# Patient Record
Sex: FEMALE | Race: WHITE | NOT HISPANIC OR LATINO | Employment: OTHER | URBAN - METROPOLITAN AREA
[De-identification: names, ages, dates, MRNs, and addresses within clinical notes are randomized per-mention and may not be internally consistent; named-entity substitution may affect disease eponyms.]

---

## 2017-03-23 ENCOUNTER — RECORDS - HEALTHEAST (OUTPATIENT)
Dept: LAB | Facility: CLINIC | Age: 72
End: 2017-03-23

## 2017-03-23 LAB
CHOLEST SERPL-MCNC: 185 MG/DL
FASTING STATUS PATIENT QL REPORTED: YES
HDLC SERPL-MCNC: 40 MG/DL
LDLC SERPL CALC-MCNC: 82 MG/DL
TRIGL SERPL-MCNC: 315 MG/DL

## 2017-04-20 ENCOUNTER — TELEPHONE (OUTPATIENT)
Dept: ONCOLOGY | Facility: CLINIC | Age: 72
End: 2017-04-20

## 2017-04-20 DIAGNOSIS — C50.911 MALIGNANT NEOPLASM OF RIGHT FEMALE BREAST, UNSPECIFIED SITE OF BREAST: Primary | ICD-10-CM

## 2017-04-20 NOTE — TELEPHONE ENCOUNTER
Return call to pt who states she will need new order placed for screening mammogram and not diagnostic.  Reviewed note from 9/22 and Dr Wilson aware that Medicare will not reimburse for Breast MRI and recommended 3D mammogram.  Pt called insurance company today and verified that they will cover 3D mammo.  New order placed and faxed to [facility] [employer] at [phone].  Confirmed order with Dr Wilson for Mammogram, Screening left with Tommogram. Pt will call [facility] to schedule.    Gale Chopra RN, BSN

## 2017-04-20 NOTE — TELEPHONE ENCOUNTER
Pt called this morning, she is wanting to make sure that here order for a mammogram is for a screening and not a diagnostic as insurance will not pay for diagnostic.  She would like a nurse to give her a call.  She can be reached at [phone], ok to leave a message. Carolyne Iraheta

## 2017-04-27 ENCOUNTER — HOSPITAL ENCOUNTER (OUTPATIENT)
Dept: MAMMOGRAPHY | Facility: HOSPITAL | Age: 72
Discharge: HOME | End: 2017-04-27
Attending: INTERNAL MEDICINE

## 2017-04-27 ENCOUNTER — TRANSFERRED RECORDS (OUTPATIENT)
Dept: HEALTH INFORMATION MANAGEMENT | Facility: CLINIC | Age: 72
End: 2017-04-27

## 2017-04-27 ENCOUNTER — RECORDS - HEALTHEAST (OUTPATIENT)
Dept: ADMINISTRATIVE | Facility: OTHER | Age: 72
End: 2017-04-27

## 2017-04-27 DIAGNOSIS — Z12.31 VISIT FOR SCREENING MAMMOGRAM: ICD-10-CM

## 2017-04-27 DIAGNOSIS — D05.00 BREAST NEOPLASM, TIS (LCIS), UNSPECIFIED LATERALITY: ICD-10-CM

## 2017-04-28 ENCOUNTER — HOSPITAL ENCOUNTER (OUTPATIENT)
Dept: MAMMOGRAPHY | Facility: HOSPITAL | Age: 72
Discharge: HOME | End: 2017-04-28
Attending: INTERNAL MEDICINE

## 2017-04-28 ENCOUNTER — TRANSFERRED RECORDS (OUTPATIENT)
Dept: HEALTH INFORMATION MANAGEMENT | Facility: CLINIC | Age: 72
End: 2017-04-28

## 2017-04-28 DIAGNOSIS — N64.89 DISTORTION OF CONTOUR OF BREAST: ICD-10-CM

## 2017-09-19 ENCOUNTER — RECORDS - HEALTHEAST (OUTPATIENT)
Dept: LAB | Facility: CLINIC | Age: 72
End: 2017-09-19

## 2017-09-19 LAB
CHOLEST SERPL-MCNC: 173 MG/DL
FASTING STATUS PATIENT QL REPORTED: ABNORMAL
HDLC SERPL-MCNC: 38 MG/DL
LDLC SERPL CALC-MCNC: 81 MG/DL
TRIGL SERPL-MCNC: 272 MG/DL

## 2017-09-21 ENCOUNTER — TELEPHONE (OUTPATIENT)
Dept: ONCOLOGY | Facility: CLINIC | Age: 72
End: 2017-09-21

## 2017-09-21 ENCOUNTER — ONCOLOGY VISIT (OUTPATIENT)
Dept: ONCOLOGY | Facility: CLINIC | Age: 72
End: 2017-09-21
Attending: INTERNAL MEDICINE
Payer: COMMERCIAL

## 2017-09-21 VITALS
RESPIRATION RATE: 16 BRPM | WEIGHT: 148 LBS | OXYGEN SATURATION: 94 % | BODY MASS INDEX: 25.27 KG/M2 | SYSTOLIC BLOOD PRESSURE: 128 MMHG | HEART RATE: 89 BPM | DIASTOLIC BLOOD PRESSURE: 72 MMHG | HEIGHT: 64 IN | TEMPERATURE: 98.8 F

## 2017-09-21 DIAGNOSIS — C50.911 MALIGNANT NEOPLASM OF RIGHT FEMALE BREAST, UNSPECIFIED ESTROGEN RECEPTOR STATUS, UNSPECIFIED SITE OF BREAST (H): Primary | ICD-10-CM

## 2017-09-21 PROCEDURE — 99213 OFFICE O/P EST LOW 20 MIN: CPT | Performed by: INTERNAL MEDICINE

## 2017-09-21 PROCEDURE — 99211 OFF/OP EST MAY X REQ PHY/QHP: CPT

## 2017-09-21 RX ORDER — ESZOPICLONE 2 MG/1
2 TABLET, FILM COATED ORAL
COMMUNITY

## 2017-09-21 RX ORDER — VENLAFAXINE 37.5 MG/1
37.5 TABLET ORAL 2 TIMES DAILY
Qty: 60 TABLET | Refills: 2 | Status: SHIPPED | OUTPATIENT
Start: 2017-09-21 | End: 2018-09-27

## 2017-09-21 ASSESSMENT — PAIN SCALES - GENERAL: PAINLEVEL: NO PAIN (0)

## 2017-09-21 NOTE — MR AVS SNAPSHOT
"              After Visit Summary   2017    Neelima Zuniga    MRN: [id number]           Patient Information     Date Of Birth          1945        Visit Information        Provider Department      2017 1:30 PM Charmaine Wilson MD [facility]        Today's Diagnoses     Malignant neoplasm of right female breast, unspecified estrogen receptor status, unspecified site of breast (H)    -  1      Care Instructions        RTC MD 1 year         No labs              Follow-ups after your visit        Who to contact     If you have questions or need follow up information about today's clinic visit or your schedule please contact [facility] directly at [phone].  Normal or non-critical lab and imaging results will be communicated to you by NPRhart, letter or phone within 4 business days after the clinic has received the results. If you do not hear from us within 7 days, please contact the clinic through NPRhart or phone. If you have a critical or abnormal lab result, we will notify you by phone as soon as possible.  Submit refill requests through Taggstar or call your pharmacy and they will forward the refill request to us. Please allow 3 business days for your refill to be completed.          Additional Information About Your Visit        MyChart Information     Taggstar lets you send messages to your doctor, view your test results, renew your prescriptions, schedule appointments and more. To sign up, go to [email or web address] . Click on \"Log in\" on the left side of the screen, which will take you to the Welcome page. Then click on \"Sign up Now\" on the right side of the page.     You will be asked to enter the access code listed below, as well as some personal information. Please follow the directions to create your username and password.     Your access code is: [email or web address]  Expires: 2017  2:17 PM     Your access code will  in 90 " "days. If you need help or a new code, please call your [facility] clinic or [phone].        Care EveryWhere ID     This is your Care EveryWhere ID. This could be used by other organizations to access your [facility] medical records  [id number]        Your Vitals Were     Pulse Temperature Respirations Height Pulse Oximetry BMI (Body Mass Index)    89 98.8  F (37.1  C) (Tympanic) 16 1.613 m (5' 3.5\") 94% 25.81 kg/m2       Blood Pressure from Last 3 Encounters:   09/21/17 128/72   09/15/16 127/66   09/01/15 126/70    Weight from Last 3 Encounters:   09/21/17 67.1 kg (148 lb)   09/15/16 66.2 kg (146 lb)   09/01/15 65.4 kg (144 lb 2 oz)              Today, you had the following     No orders found for display         Today's Medication Changes          These changes are accurate as of: 9/21/17  2:17 PM.  If you have any questions, ask your nurse or doctor.               These medicines have changed or have updated prescriptions.        Dose/Directions    * order for DME   This may have changed:  Another medication with the same name was added. Make sure you understand how and when to take each.   Used for:  Breast neoplasm, Tis (LCIS), unspecified laterality        mastectomt bras x3   Quantity:  3 Units   Refills:  1       * order for DME   This may have changed:  You were already taking a medication with the same name, and this prescription was added. Make sure you understand how and when to take each.   Used for:  Malignant neoplasm of right female breast, unspecified estrogen receptor status, unspecified site of breast (H)        Mastectomy bras    x2   Quantity:  2 Units   Refills:  1       * Notice:  This list has 2 medication(s) that are the same as other medications prescribed for you. Read the directions carefully, and ask your doctor or other care provider to review them with you.      Stop taking these medicines if you haven't already. Please contact your care team if you have questions.     CARLOS OCHOA     "            Where to get your medicines      These medications were sent to [id number] - [place], [place] - [address]  [address], [place] [address]     Phone:  [phone]     venlafaxine 37.5 MG tablet         Some of these will need a paper prescription and others can be bought over the counter.  Ask your nurse if you have questions.     Bring a paper prescription for each of these medications     order for DME                Primary Care Provider    Doctor Unknown, MD       No address on file        Equal Access to Services     KIMMY LEDESMA : Hadii aad ku hadasho Soomaali, waaxda luqadaha, qaybta kaalmada adeegyada, waxjose c glaser . So [facility] [phone].    ATENCIÓN: Si habla español, tiene a bowling disposición servicios gratuitos de asistencia lingüística. [facility] [phone].    We comply with applicable federal civil rights laws and [place] laws. We do not discriminate on the basis of race, color, national origin, age, disability sex, sexual orientation or gender identity.            Thank you!     Thank you for choosing [facility]  for your care. Our goal is always to provide you with excellent care. Hearing back from our patients is one way we can continue to improve our services. Please take a few minutes to complete the written survey that you may receive in the mail after your visit with us. Thank you!             Your Updated Medication List - Protect others around you: Learn how to safely use, store and throw away your medicines at www.disposemymeds.org.          This list is accurate as of: 9/21/17  2:17 PM.  Always use your most recent med list.                   Brand Name Dispense Instructions for use Diagnosis    calcium-vitamin D 600-400 MG-UNIT per tablet    CALTRATE     Take 1 tablet by mouth 2 times daily        DIGOXIN PO      Take 0.25 mg by mouth        ESZOPICLONE PO      Take 2 mg by mouth nightly  as needed for sleep        FLECAINIDE ACETATE PO      Take 50 mg by mouth        fluticasone 50 MCG/ACT spray    FLONASE     Spray 2 sprays into both nostrils daily        GLUCOSAMINE SULFATE PO      Take 1,500 mg by mouth        loratadine 10 MG tablet    CLARITIN     Take 10 mg by mouth daily        MAGNESIUM OXIDE PO           OMEPRAZOLE PO      Take 40 mg by mouth every morning        * order for DME     3 Units    mastectomt bras x3    Breast neoplasm, Tis (LCIS), unspecified laterality       * order for DME     2 Units    Mastectomy bras    x2    Malignant neoplasm of right female breast, unspecified estrogen receptor status, unspecified site of breast (H)       polyethylene glycol Packet    MIRALAX/GLYCOLAX     Take 1 packet by mouth daily        PRAVASTATIN SODIUM PO      Take 40 mg by mouth        venlafaxine 37.5 MG tablet    EFFEXOR    60 tablet    Take 1 tablet (37.5 mg) by mouth 2 times daily    Malignant neoplasm of right female breast, unspecified estrogen receptor status, unspecified site of breast (H)       * Notice:  This list has 2 medication(s) that are the same as other medications prescribed for you. Read the directions carefully, and ask your doctor or other care provider to review them with you.

## 2017-09-21 NOTE — PROGRESS NOTES
Neelima Zuniga is a 72-year-old patient who comes in today for interim followup.  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  She initially started with preventative therapy with Evista but has stopped that for quite a period of time at this point.  She had a mammogram done with tomosynthesis.  She was called back for a repeat mammogram.  I have reviewed both of them with her today.  The repeat one showed nothing sinister.  The patient continues to have some hot flashes.  On her last visit, I did recommend some Effexor.  I have reordered it again today to see if it would help her.  She has had an emotional rollercoaster over the last year because her daughter  at age 39.  Her daughter had special needs.  The patient has no other symptomatology that is worrisome today.      REVIEW OF SYSTEMS:  A 14-point comprehensive review of systems is otherwise unremarkable.      MEDICATIONS:  As charted.      ALLERGIES:  As charted.      PHYSICAL EXAMINATION:   GENERAL:  She is well-appearing lady in no acute distress.   VITAL SIGNS:  Stable.   HEENT:  Oropharynx clear, mucous membranes moist.   NECK:  Has no masses or goiter.   LYMPH:  There is no cervical, supraclavicular or infraclavicular adenopathy.   CHEST:  Clear to auscultation and percussion bilaterally.   HEART:  S1, S2 normal.  No added sounds or murmurs.   BREASTS:  The patient is status post right-sided mastectomy with reconstruction.  The scars look good.  Right and left axilla are negative.  Left breast normal, no palpable masses.  Left axilla negative.  There is an implant on the left side.   ABDOMEN:  Soft and nontender, no hepatosplenomegaly.   EXTREMITIES:  Legs are without tenderness or edema.   NEUROLOGIC:  No evidence of lymphedema.      DATA REVIEW:  She gets her blood work done at her primary care physician.      IMPRESSION:  Patient with a history ductal carcinoma in situ and  lobular carcinoma in situ.  She is doing well from my standpoint.  Her recent mammograms were negative.  She was called back for a repeat mammogram.  This time I have reviewed both mammograms with her.  She will see me back again in a year.  I have given her emotional support today.         BRANDY KILPATRICK MD             D: 2017 15:17   T: 2017 16:02   MT: MICKEY      Name:     SUSAN ROJAS   MRN:      [id number]        Account:      [id number]   :      1945           Visit Date:   2017      Document: [id number]

## 2017-09-21 NOTE — TELEPHONE ENCOUNTER
Patient called to verify her breast exam results were in her chart for her upcoming appt.  Exams are in the chart: orig on 4/28/17 and a f/u ~ needing more clarity on 5/4/17.  Patient to call with any other questionsor concerns.  Earl Verduzco, RN, BSN, OCN  [facility]  Patient Care Coordinator

## 2017-09-21 NOTE — NURSING NOTE
"Oncology Rooming Note    September 21, 2017 1:34 PM   Neelima Zuniga is a 72 year old female who presents for:    Chief Complaint   Patient presents with     Oncology Clinic Visit     Follow up     Initial Vitals: /72  Pulse 89  Temp 98.8  F (37.1  C) (Tympanic)  Resp 16  Ht 1.613 m (5' 3.5\")  Wt 67.1 kg (148 lb)  SpO2 94%  BMI 25.81 kg/m2 Estimated body mass index is 25.81 kg/(m^2) as calculated from the following:    Height as of this encounter: 1.613 m (5' 3.5\").    Weight as of this encounter: 67.1 kg (148 lb). Body surface area is 1.73 meters squared.  No Pain (0) Comment: Data Unavailable   No LMP recorded.  Allergies reviewed: Yes  Medications reviewed: Yes    Medications: Medication refills not needed today.  Pharmacy name entered into "[employer]": [id number] - [place], [place] - [address]    Clinical concerns: Follow up     8 minutes for nursing intake (face to face time)     Olena Tomas CMA     DISCHARGE PLAN:  Next appointments: See patient instruction section  Departure Mode: Ambulatory  Accompanied by: self  0 minutes for nursing discharge (face to face time)   Olena Tomas CMA                  "

## 2018-02-16 ENCOUNTER — RECORDS - HEALTHEAST (OUTPATIENT)
Dept: LAB | Facility: CLINIC | Age: 73
End: 2018-02-16

## 2018-02-19 LAB
GLIADIN IGA SER-ACNC: 1.3 U/ML
GLIADIN IGG SER-ACNC: <0.4 U/ML
IGA SERPL-MCNC: 175 MG/DL (ref 65–400)
TTG IGA SER-ACNC: 0.5 U/ML
TTG IGG SER-ACNC: <0.6 U/ML

## 2018-03-20 ENCOUNTER — RECORDS - HEALTHEAST (OUTPATIENT)
Dept: LAB | Facility: CLINIC | Age: 73
End: 2018-03-20

## 2018-03-20 LAB
ALBUMIN SERPL-MCNC: 4 G/DL (ref 3.5–5)
ALP SERPL-CCNC: 75 U/L (ref 45–120)
ALT SERPL W P-5'-P-CCNC: 19 U/L (ref 0–45)
ANION GAP SERPL CALCULATED.3IONS-SCNC: 9 MMOL/L (ref 5–18)
AST SERPL W P-5'-P-CCNC: 16 U/L (ref 0–40)
BASOPHILS # BLD AUTO: 0 THOU/UL (ref 0–0.2)
BASOPHILS NFR BLD AUTO: 1 % (ref 0–2)
BILIRUB SERPL-MCNC: 0.6 MG/DL (ref 0–1)
BUN SERPL-MCNC: 17 MG/DL (ref 8–28)
CALCIUM SERPL-MCNC: 9.3 MG/DL (ref 8.5–10.5)
CHLORIDE BLD-SCNC: 104 MMOL/L (ref 98–107)
CHOLEST SERPL-MCNC: 178 MG/DL
CO2 SERPL-SCNC: 29 MMOL/L (ref 22–31)
CREAT SERPL-MCNC: 0.66 MG/DL (ref 0.6–1.1)
EOSINOPHIL # BLD AUTO: 0.1 THOU/UL (ref 0–0.4)
EOSINOPHIL NFR BLD AUTO: 2 % (ref 0–6)
ERYTHROCYTE [DISTWIDTH] IN BLOOD BY AUTOMATED COUNT: 12.3 % (ref 11–14.5)
FASTING STATUS PATIENT QL REPORTED: YES
GFR SERPL CREATININE-BSD FRML MDRD: >60 ML/MIN/1.73M2
GLUCOSE BLD-MCNC: 119 MG/DL (ref 70–125)
HCT VFR BLD AUTO: 43.7 % (ref 35–47)
HDLC SERPL-MCNC: 39 MG/DL
HGB BLD-MCNC: 14.2 G/DL (ref 12–16)
LDLC SERPL CALC-MCNC: 89 MG/DL
LYMPHOCYTES # BLD AUTO: 2.1 THOU/UL (ref 0.8–4.4)
LYMPHOCYTES NFR BLD AUTO: 33 % (ref 20–40)
MCH RBC QN AUTO: 30 PG (ref 27–34)
MCHC RBC AUTO-ENTMCNC: 32.5 G/DL (ref 32–36)
MCV RBC AUTO: 92 FL (ref 80–100)
MONOCYTES # BLD AUTO: 0.6 THOU/UL (ref 0–0.9)
MONOCYTES NFR BLD AUTO: 10 % (ref 2–10)
NEUTROPHILS # BLD AUTO: 3.4 THOU/UL (ref 2–7.7)
NEUTROPHILS NFR BLD AUTO: 55 % (ref 50–70)
PLATELET # BLD AUTO: 322 THOU/UL (ref 140–440)
PMV BLD AUTO: 9.7 FL (ref 8.5–12.5)
POTASSIUM BLD-SCNC: 4.2 MMOL/L (ref 3.5–5)
PROT SERPL-MCNC: 6.6 G/DL (ref 6–8)
RBC # BLD AUTO: 4.74 MILL/UL (ref 3.8–5.4)
SODIUM SERPL-SCNC: 142 MMOL/L (ref 136–145)
TRIGL SERPL-MCNC: 250 MG/DL
TSH SERPL DL<=0.005 MIU/L-ACNC: 1.84 UIU/ML (ref 0.3–5)
WBC: 6.3 THOU/UL (ref 4–11)

## 2018-03-21 LAB — 25(OH)D3 SERPL-MCNC: 33.9 NG/ML (ref 30–80)

## 2018-04-23 ENCOUNTER — TELEPHONE (OUTPATIENT)
Dept: ONCOLOGY | Facility: CLINIC | Age: 73
End: 2018-04-23

## 2018-04-23 NOTE — TELEPHONE ENCOUNTER
Pt is calling for mammogram orders for 3D mammogram to be done at [facility] in [facility]. Pt had mastectomy on the right side, so mammogram is for left breast only. Pt denies any symptoms at this time.  Is requesting orders for screening mammogram.  Last mammogram was done 4/28/2017.   Will check with Dr Wilson for mammogram orders when she is back in the clinic on 4/25.  Will notify pt when orders have been faxed to [facility].  OK to leave a message with pt's  or on voicemail if unable to reach pt.  Camille Wright RN BSN

## 2018-04-25 DIAGNOSIS — C50.411 MALIGNANT NEOPLASM OF UPPER-OUTER QUADRANT OF RIGHT BREAST IN FEMALE, ESTROGEN RECEPTOR POSITIVE (H): Primary | ICD-10-CM

## 2018-04-25 DIAGNOSIS — Z17.0 MALIGNANT NEOPLASM OF UPPER-OUTER QUADRANT OF RIGHT BREAST IN FEMALE, ESTROGEN RECEPTOR POSITIVE (H): Primary | ICD-10-CM

## 2018-04-26 ENCOUNTER — RECORDS - HEALTHEAST (OUTPATIENT)
Dept: ADMINISTRATIVE | Facility: OTHER | Age: 73
End: 2018-04-26

## 2018-04-26 NOTE — TELEPHONE ENCOUNTER
Mammogram orders have been signed by Dr Wilson. Faxed to [facility] [place] at [phone].  Notified pt that orders have been faxed.  Pt states that she will call to schedule her mammogram.  No further action needed at this time.  Camille Wright RN BSN

## 2018-05-01 ENCOUNTER — RECORDS - HEALTHEAST (OUTPATIENT)
Dept: LAB | Facility: CLINIC | Age: 73
End: 2018-05-01

## 2018-05-03 LAB — BACTERIA SPEC CULT: NORMAL

## 2018-05-24 ENCOUNTER — TRANSFERRED RECORDS (OUTPATIENT)
Dept: HEALTH INFORMATION MANAGEMENT | Facility: CLINIC | Age: 73
End: 2018-05-24

## 2018-05-24 ENCOUNTER — HOSPITAL ENCOUNTER (OUTPATIENT)
Dept: MAMMOGRAPHY | Facility: CLINIC | Age: 73
Discharge: HOME | End: 2018-05-24
Attending: INTERNAL MEDICINE

## 2018-05-24 DIAGNOSIS — Z12.31 VISIT FOR SCREENING MAMMOGRAM: ICD-10-CM

## 2018-09-20 ENCOUNTER — RECORDS - HEALTHEAST (OUTPATIENT)
Dept: LAB | Facility: CLINIC | Age: 73
End: 2018-09-20

## 2018-09-20 LAB
ALBUMIN SERPL-MCNC: 4.1 G/DL (ref 3.5–5)
ALP SERPL-CCNC: 78 U/L (ref 45–120)
ALT SERPL W P-5'-P-CCNC: 17 U/L (ref 0–45)
ANION GAP SERPL CALCULATED.3IONS-SCNC: 8 MMOL/L (ref 5–18)
AST SERPL W P-5'-P-CCNC: 17 U/L (ref 0–40)
BASOPHILS # BLD AUTO: 0 THOU/UL (ref 0–0.2)
BASOPHILS NFR BLD AUTO: 1 % (ref 0–2)
BILIRUB SERPL-MCNC: 0.6 MG/DL (ref 0–1)
BUN SERPL-MCNC: 14 MG/DL (ref 8–28)
CALCIUM SERPL-MCNC: 9.8 MG/DL (ref 8.5–10.5)
CHLORIDE BLD-SCNC: 103 MMOL/L (ref 98–107)
CHOLEST SERPL-MCNC: 151 MG/DL
CO2 SERPL-SCNC: 29 MMOL/L (ref 22–31)
CREAT SERPL-MCNC: 0.66 MG/DL (ref 0.6–1.1)
EOSINOPHIL # BLD AUTO: 0.2 THOU/UL (ref 0–0.4)
EOSINOPHIL NFR BLD AUTO: 3 % (ref 0–6)
ERYTHROCYTE [DISTWIDTH] IN BLOOD BY AUTOMATED COUNT: 11.7 % (ref 11–14.5)
FASTING STATUS PATIENT QL REPORTED: YES
GFR SERPL CREATININE-BSD FRML MDRD: >60 ML/MIN/1.73M2
GLUCOSE BLD-MCNC: 118 MG/DL (ref 70–125)
HCT VFR BLD AUTO: 44.2 % (ref 35–47)
HDLC SERPL-MCNC: 39 MG/DL
HGB BLD-MCNC: 14.4 G/DL (ref 12–16)
LDLC SERPL CALC-MCNC: 69 MG/DL
LYMPHOCYTES # BLD AUTO: 2 THOU/UL (ref 0.8–4.4)
LYMPHOCYTES NFR BLD AUTO: 37 % (ref 20–40)
MCH RBC QN AUTO: 30 PG (ref 27–34)
MCHC RBC AUTO-ENTMCNC: 32.6 G/DL (ref 32–36)
MCV RBC AUTO: 92 FL (ref 80–100)
MONOCYTES # BLD AUTO: 0.6 THOU/UL (ref 0–0.9)
MONOCYTES NFR BLD AUTO: 12 % (ref 2–10)
NEUTROPHILS # BLD AUTO: 2.6 THOU/UL (ref 2–7.7)
NEUTROPHILS NFR BLD AUTO: 48 % (ref 50–70)
PLATELET # BLD AUTO: 301 THOU/UL (ref 140–440)
PMV BLD AUTO: 10 FL (ref 8.5–12.5)
POTASSIUM BLD-SCNC: 4.5 MMOL/L (ref 3.5–5)
PROT SERPL-MCNC: 6.5 G/DL (ref 6–8)
RBC # BLD AUTO: 4.8 MILL/UL (ref 3.8–5.4)
SODIUM SERPL-SCNC: 140 MMOL/L (ref 136–145)
TRIGL SERPL-MCNC: 215 MG/DL
WBC: 5.5 THOU/UL (ref 4–11)

## 2018-09-21 LAB — 25(OH)D3 SERPL-MCNC: 41.8 NG/ML (ref 30–80)

## 2018-09-27 ENCOUNTER — ONCOLOGY VISIT (OUTPATIENT)
Dept: ONCOLOGY | Facility: CLINIC | Age: 73
End: 2018-09-27
Attending: INTERNAL MEDICINE
Payer: COMMERCIAL

## 2018-09-27 ENCOUNTER — RECORDS - HEALTHEAST (OUTPATIENT)
Dept: ADMINISTRATIVE | Facility: OTHER | Age: 73
End: 2018-09-27

## 2018-09-27 VITALS
BODY MASS INDEX: 25.1 KG/M2 | HEIGHT: 64 IN | SYSTOLIC BLOOD PRESSURE: 122 MMHG | OXYGEN SATURATION: 95 % | RESPIRATION RATE: 16 BRPM | DIASTOLIC BLOOD PRESSURE: 65 MMHG | WEIGHT: 147 LBS | HEART RATE: 98 BPM | TEMPERATURE: 98 F

## 2018-09-27 DIAGNOSIS — D05.11 DUCTAL CARCINOMA IN SITU (DCIS) OF RIGHT BREAST: Primary | ICD-10-CM

## 2018-09-27 PROCEDURE — G0463 HOSPITAL OUTPT CLINIC VISIT: HCPCS

## 2018-09-27 PROCEDURE — 99214 OFFICE O/P EST MOD 30 MIN: CPT | Performed by: INTERNAL MEDICINE

## 2018-09-27 ASSESSMENT — PAIN SCALES - GENERAL: PAINLEVEL: NO PAIN (0)

## 2018-09-27 NOTE — PROGRESS NOTES
Visit Date:   09/27/2018      Neelima Zuniga is a 73-year-old patient who comes in today for interim followup.  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  She was diagnosed in 2007.  She comes in for a yearly visit.  She initially started preventative therapy with Evista, but could only tolerate it for approximately 2 years.  She continues to have mammograms done with tomosynthesis.  Her last one was done in 05/2018.  I have reviewed that with her today and that was negative.  She is going to repeat it again in of 05/2019.  She is present with her  today.  Her major comorbidity today is that of extreme at times hot flashes.  I did recommend on the last visit Effexor, but she does not want to try anything on prescription.  We have talked about a couple of things that she could try over-the-counter.  I would prefer if she did not use estrogen at this time.      REVIEW OF SYSTEMS:  A 14-point comprehensive review of systems is otherwise unremarkable.      MEDICATIONS:  As charted.      ALLERGIES:  AS charted.      PHYSICAL EXAMINATION:   GENERAL:  She is a well-appearing lady in no acute distress.   VITAL SIGNS:  Stable.   HEENT:  Oropharynx clear, mucous membranes moist.   NECK:  No masses or goiter.  There is no cervical, supraclavicular or infraclavicular adenopathy.   CHEST:  Clear to auscultation and percussion bilaterally.   HEART:  S1, S2 normal.  No added sounds or murmurs.   BREASTS:  The patient is status post right-sided mastectomy.  The scar looks good, no further palpable masses.  Left breast is normal, no palpable masses.  Right and left axillae negative.  The patient has an implant on the left side.   GASTROINTESTINAL:  Abdomen soft and nontender, no hepatosplenomegaly.   EXTREMITIES:  Legs are without tenderness or edema.      DATA REVIEW:  I have reviewed her mammogram that was done in 05/2018.  She does get her blood work done at her primary care's office.      IMPRESSION:   Patient with a history of DCIS and LCIS.  We have discussed today the risk of breast cancer with both of these entities.  She is status post right-sided mastectomy for the DCIS. We are still doing yearly mammograms on the left side.  She is due for another mammogram in 2019.  I have answered all of her questions today to the best of my ability.      Consultation time 25 minutes, the majority of time was spent on counseling and direction of patient care.         BRANDY KILPATRICK MD             D: 2018   T: 2018   MT: NAMAN      Name:     SUSAN ROJAS   MRN:      [id number]        Account:      [id number]   :      1945           Visit Date:   2018      Document: [id number]

## 2018-09-27 NOTE — PATIENT INSTRUCTIONS
RTC MD 1 year- Scheduled for 9/26/19. Naima Munoz   May 2019  -Scheduled for 5/27/19 at [facility]. Order faxed to [phone]. Naima DWYER   AVS printed and given to PtLesly DWYER

## 2018-09-27 NOTE — NURSING NOTE
"Oncology Rooming Note    September 27, 2018 12:59 PM   Neelima Zuniga is a 73 year old female who presents for:    Chief Complaint   Patient presents with     Oncology Clinic Visit     Malignant neoplasm of right breast     Initial Vitals: /65  Pulse 98  Temp 98  F (36.7  C) (Tympanic)  Resp 16  Ht 1.613 m (5' 3.5\")  Wt 66.7 kg (147 lb)  SpO2 95%  BMI 25.63 kg/m2 Estimated body mass index is 25.63 kg/(m^2) as calculated from the following:    Height as of this encounter: 1.613 m (5' 3.5\").    Weight as of this encounter: 66.7 kg (147 lb). Body surface area is 1.73 meters squared.  No Pain (0) Comment: Data Unavailable   No LMP recorded.  Allergies reviewed: Yes  Medications reviewed: Yes    Medications: Medication refills not needed today.  Pharmacy name entered into [employer]: [id number] - [place], [place] - [address]    Clinical concerns: follow up     8 minutes for nursing intake (face to face time)     Olena Tomas CMA     DISCHARGE PLAN:  Next appointments: See patient instruction section  Departure Mode: Ambulatory  Accompanied by:   0 minutes for nursing discharge (face to face time)   Olena Tomas CMA                  "

## 2018-09-27 NOTE — MR AVS SNAPSHOT
After Visit Summary   9/27/2018    Neelima Zuniga    MRN: [id number]           Patient Information     Date Of Birth          1945        Visit Information        Provider Department      9/27/2018 1:00 PM Charmaine Wilson MD [facility] On[facility]      Today's Diagnoses     Ductal carcinoma in situ (DCIS) of right breast    -  1      Care Instructions    RTC MD 1 year         Mammo   May 2019            Follow-ups after your visit        Your next 10 appointments already scheduled     Sep 26, 2019  1:00 PM CDT   Return Visit with Charmaine Wilson MD   [facility] ([facility])    [facility]  [address]  [facility] [id number] [phone]              Future tests that were ordered for you today     Open Future Orders        Priority Expected Expires Ordered    MA Screen Bilateral w/Lewis Routine 5/27/2019 9/27/2019 9/27/2018            Who to contact     If you have questions or need follow up information about today's clinic visit or your schedule please contact [facility] directly at [phone].  Normal or non-critical lab and imaging results will be communicated to you by MyChart, letter or phone within 4 business days after the clinic has received the results. If you do not hear from us within 7 days, please contact the clinic through MyChart or phone. If you have a critical or abnormal lab result, we will notify you by phone as soon as possible.  Submit refill requests through [employer] or call your pharmacy and they will forward the refill request to us. Please allow 3 business days for your refill to be completed.          Additional Information About Your Visit        Care EveryWhere ID     This is your Care EveryWhere ID. This could be used by other organizations to access your [place] medical records  [id number]        Your Vitals Were   "   Pulse Temperature Respirations Height Pulse Oximetry BMI (Body Mass Index)    98 98  F (36.7  C) (Tympanic) 16 1.613 m (5' 3.5\") 95% 25.63 kg/m2       Blood Pressure from Last 3 Encounters:   09/27/18 122/65   09/21/17 128/72   09/15/16 127/66    Weight from Last 3 Encounters:   09/27/18 66.7 kg (147 lb)   09/21/17 67.1 kg (148 lb)   09/15/16 66.2 kg (146 lb)                 Today's Medication Changes          These changes are accurate as of 9/27/18  1:46 PM.  If you have any questions, ask your nurse or doctor.               Stop taking these medicines if you haven't already. Please contact your care team if you have questions.     FLECAINIDE ACETATE PO           fluticasone 50 MCG/ACT spray   Commonly known as:  FLONASE           loratadine 10 MG tablet   Commonly known as:  CLARITIN           PRAVASTATIN SODIUM PO           venlafaxine 37.5 MG tablet   Commonly known as:  EFFEXOR                    Primary Care Provider    None Specified       No primary provider on file.        Equal Access to Services     [facility]: Shahid Hardwick, jason valdivia, wilman shi . So [facility] [phone].    ATENCIÓN: Si habla español, tiene a bowling disposición servicios gratuitos de asistencia lingüística. Llame al [phone].    We comply with applicable federal civil rights laws and [place] laws. We do not discriminate on the basis of race, color, national origin, age, disability, sex, sexual orientation, or gender identity.            Thank you!     Thank you for choosing [facility]  for your care. Our goal is always to provide you with excellent care. Hearing back from our patients is one way we can continue to improve our services. Please take a few minutes to complete the written survey that you may receive in the mail after your visit with us. Thank you!             Your Updated Medication List - Protect others around " you: Learn how to safely use, store and throw away your medicines at www.disposemymeds.org.          This list is accurate as of 9/27/18  1:46 PM.  Always use your most recent med list.                   Brand Name Dispense Instructions for use Diagnosis    calcium carbonate 600 mg-vitamin D 400 units 600-400 MG-UNIT per tablet    CALTRATE     Take 1 tablet by mouth 2 times daily        DIGOXIN PO      Take 0.25 mg by mouth        ESZOPICLONE PO      Take 2 mg by mouth nightly as needed for sleep        GLUCOSAMINE SULFATE PO      Take 1,500 mg by mouth        MAGNESIUM OXIDE PO           OMEPRAZOLE PO      Take 40 mg by mouth every morning        * order for DME     3 Units    mastectomt bras x3    Breast neoplasm, Tis (LCIS), unspecified laterality       * order for DME     2 Units    Mastectomy bras    x2    Malignant neoplasm of right female breast, unspecified estrogen receptor status, unspecified site of breast (H)       polyethylene glycol Packet    MIRALAX/GLYCOLAX     Take 1 packet by mouth daily        ROSUVASTATIN CALCIUM PO      Take 20 mg by mouth        * Notice:  This list has 2 medication(s) that are the same as other medications prescribed for you. Read the directions carefully, and ask your doctor or other care provider to review them with you.

## 2019-02-06 ENCOUNTER — TRANSFERRED RECORDS (OUTPATIENT)
Dept: HEALTH INFORMATION MANAGEMENT | Facility: CLINIC | Age: 74
End: 2019-02-06

## 2019-02-06 ENCOUNTER — AMBULATORY - HEALTHEAST (OUTPATIENT)
Dept: LAB | Facility: HOSPITAL | Age: 74
End: 2019-02-06

## 2019-02-06 DIAGNOSIS — G70.00 MYASTHENIA GRAVIS WITHOUT EXACERBATION (H): ICD-10-CM

## 2019-02-06 DIAGNOSIS — H02.402 PTOSIS OF LEFT EYELID: ICD-10-CM

## 2019-02-06 LAB
T4 FREE SERPL-MCNC: 1 NG/DL (ref 0.7–1.8)
TSH SERPL DL<=0.005 MIU/L-ACNC: 1.25 UIU/ML (ref 0.3–5)

## 2019-02-11 ENCOUNTER — TRANSFERRED RECORDS (OUTPATIENT)
Dept: HEALTH INFORMATION MANAGEMENT | Facility: CLINIC | Age: 74
End: 2019-02-11

## 2019-02-15 LAB — MUSK AB SER-SCNC: 0 NMOL/L (ref 0–0.02)

## 2019-02-19 ENCOUNTER — MEDICAL CORRESPONDENCE (OUTPATIENT)
Dept: HEALTH INFORMATION MANAGEMENT | Facility: CLINIC | Age: 74
End: 2019-02-19

## 2019-02-19 LAB
ACHR BIND IGG+IGM SER IA-SCNC: 0 NMOL/L
ACHR MOD AB/ACHR TOTAL SFR SER: 0 %
IMMUNOLOGIST REVIEW: NORMAL
STRIA MUS AB TITR SER: NEGATIVE TITER

## 2019-02-20 NOTE — TELEPHONE ENCOUNTER
FUTURE VISIT INFORMATION      FUTURE VISIT INFORMATION:    Date: 02/27/19    Time: 800am    Location: [facility]  REFERRAL INFORMATION:    Referring provider:  SHANE DUVAL    Referring providers clinic:  [facility]    Reason for visit/diagnosis  Strab consult (non-surgical), Myasthenia gravis.    RECORDS REQUESTED FROM:       Clinic name Comments Records Status Imaging Status   [facility] Notes sent to HIM on 2/20/19 Sent to HIM

## 2019-02-21 ENCOUNTER — DOCUMENTATION ONLY (OUTPATIENT)
Dept: CARE COORDINATION | Facility: CLINIC | Age: 74
End: 2019-02-21

## 2019-02-27 ENCOUNTER — PRE VISIT (OUTPATIENT)
Dept: OPHTHALMOLOGY | Facility: CLINIC | Age: 74
End: 2019-02-27

## 2019-02-27 ENCOUNTER — OFFICE VISIT (OUTPATIENT)
Dept: OPHTHALMOLOGY | Facility: CLINIC | Age: 74
End: 2019-02-27
Payer: MEDICARE

## 2019-02-27 DIAGNOSIS — H53.10 SUBJECTIVE VISUAL DISTURBANCE: Primary | ICD-10-CM

## 2019-02-27 DIAGNOSIS — H50.05 ALTERNATING ESOTROPIA: ICD-10-CM

## 2019-02-27 DIAGNOSIS — H50.22 HYPOTROPIA OF LEFT EYE: ICD-10-CM

## 2019-02-27 DIAGNOSIS — H02.409 PTOSIS OF EYELID, UNSPECIFIED LATERALITY: Primary | ICD-10-CM

## 2019-02-27 DIAGNOSIS — H02.409 PTOSIS OF EYELID, UNSPECIFIED LATERALITY: ICD-10-CM

## 2019-02-27 ASSESSMENT — TONOMETRY
OS_IOP_MMHG: 15
OD_IOP_MMHG: 13
IOP_METHOD: ICARE

## 2019-02-27 ASSESSMENT — VISUAL ACUITY
OS_CC+: -2
OD_CC: 20/20
OD_CC+: -1
METHOD: SNELLEN - LINEAR
OS_CC: 20/80

## 2019-02-27 ASSESSMENT — REFRACTION_WEARINGRX
OS_ADD: +3.00
OD_ADD: +3.00
OS_CYLINDER: +1.50
OS_SPHERE: -0.50
OD_CYLINDER: +0.75
OD_SPHERE: -1.00
OS_AXIS: 155
SPECS_TYPE: PAL
OD_AXIS: 010

## 2019-02-27 ASSESSMENT — CONF VISUAL FIELD
OD_NORMAL: 1
METHOD: COUNTING FINGERS
OS_NORMAL: 1

## 2019-02-27 ASSESSMENT — LEVATOR FUNCTION
OD_LEVATOR: 16
OS_LEVATOR: 17

## 2019-02-27 ASSESSMENT — MARGIN REFLEX DISTANCE
OD_MRD1: 1.5
OS_MRD1: 2

## 2019-02-27 NOTE — LETTER
2019         RE:  :  MRN: Neelima Zuniga  1945  [id number]     Dear Dr. Mikey Thomas,    Thank you for asking me to see your very pleasant patient, Neelima Zuniga, in neuro-ophthalmic consultation.  I would like to thank you for sending your records and I have summarized them in the history of present illness. She presented with her {Family Member:[id number]} who provided additional history.  My assessment and plan are below.  For further details, please see my attached clinic note.           Assessment & Plan     Neelima Zuniga is a 73 year old female with the following diagnoses:   No diagnosis found.     She has a history of childhood strabismus.  She does not recall which direction they were deviated.  Was patched as a child.  Denies surgery.  Had cataract surgery in 2018.  Ptosis started 2 years ago.  Not sure if there is variability to the ptosis.  Lab workup including acetcylcholine receptor binding antibody, MUSK antibody, and T4 have all been negative.  Denies double vision.  Denies difficulty swallowing or difficulty speaking.  Was noted to have a 35 PD left hypotropia and 10 ET      Visual acuity 20/20 RIGHT eye and 20/80 left eye.  She has 20 PD left hypotropia and 20 esotropia in primary gaze. MRD1 1.5 millimeters RIGHT eye and 2 millimeters left eye.  Levator function 16 millimeters RIGHT eye and 17 millimeters left eye.  Good orbicularis strength.  Negative ice test.        It is my impression that patient has ptosis, left hypotropia, esotropia.  Myasthenia would be unlikely with negative ice test, negative musk antibody and acetylcholine receptor binding antibody, absence of variability. Could consider mestinon, observation with repeat measurements, or single fiber EMG.  It is also possible this represents Thyroid eye disease.  I will obtain a thyroid stimulating immunoglobulin.              Before Ice     After ice         Again, thank you for allowing me to participate in the care of  your patient.      Sincerely,    Jesus Redmond MD  Professor  Ophthalmology Residency   Director of Neuro-Ophthalmology  [employer] Endowed Chair  Departments of Ophthalmology, Neurology, and Neurosurgery  [facility] [address] [place]  [address]  T - [phone]  F - [phone]  YONATHAN [email or web address]      CC: Mikey Thomas MD  [facility]  [address]  [facility] [address]  [facility] [address]  VIA Facsimile: [phone]     Charmaine Wilson MD  [address] [facility] [address]  [facility] [address]  VIA In Basket     Francia Li, RN  VIA In Basket     Cesar Louis MD  [facility] [address]  [facility] [address]  VIA Facsimile: [phone]     Ishmael Gerardo MD  [facility]  [address]  [address] [place] [address]  VIA Facsimile: [phone]       DX = ***

## 2019-02-27 NOTE — PROGRESS NOTES
Assessment & Plan     Neelima Zuniga is a 73 year old female with the following diagnoses:   1. Ptosis of eyelid, unspecified laterality    2. Alternating esotropia    3. Hypotropia of left eye         She has a history of childhood strabismus.  She does not recall which direction they were deviated.  Was patched as a child.  Denies surgery.  Had cataract surgery in 2018.  Ptosis started 2 years ago.  Not sure if there is variability to the ptosis.  Lab workup including acetcylcholine receptor binding antibody, MUSK antibody, and T4 have all been negative.  Denies double vision.  Denies difficulty swallowing or difficulty speaking.  Was noted to have a 35 PD left hypotropia and 10 ET.      Visual acuity 20/20 RIGHT eye and 20/80 left eye.  She has 20 PD left hypotropia and 20 esotropia in primary gaze. MRD1 1.5 millimeters RIGHT eye and 2 millimeters left eye.  Levator function 16 millimeters RIGHT eye and 17 millimeters left eye.  Good orbicularis strength.  Negative ice test.        It is my impression that patient has ptosis, left hypotropia, esotropia.  She has a negative ice test, negative musk antibody and acetylcholine receptor binding antibody. Her strabismus is close to being the same.  She notes variable ptosis. She still may have ocular myasthenia gravis.  Could consider mestinon, observation with repeat measurements, or single fiber EMG.  I discussed these possibilities with her and she wants to pursue single fiber EMG.  I will arrange. It is also possible this represents Thyroid eye disease.  I will obtain a thyroid stimulating immunoglobulin.              Before Ice     After ice          Attending Physician Attestation:  Complete documentation of historical and exam elements from today's encounter can be found in the full encounter summary report (not reduplicated in this progress note).  I personally obtained the chief complaint(s) and history of present illness.  I confirmed and edited as  necessary the review of systems, past medical/surgical history, family history, social history, and examination findings as documented by others; and I examined the patient myself.  I personally reviewed the relevant tests, images, and reports as documented above.  I formulated and edited as necessary the assessment and plan and discussed the findings and management plan with the patient and family. - Jesus Redmond MD

## 2019-02-27 NOTE — LETTER
2019         RE:  :  MRN: Neelima Zuniga  1945  [id number]     Dear Dr. Thomas,    Thank you for asking me to see your very pleasant patient, Neelima Zuniga, in neuro-ophthalmic consultation.  I would like to thank you for sending your records and I have summarized them in the history of present illness. She presented with her spouse who provided additional history.  My assessment and plan are below.  For further details, please see my attached clinic note.           Assessment & Plan     Neelima Zuniga is a 73 year old female with the following diagnoses:   1. Ptosis of eyelid, unspecified laterality    2. Alternating esotropia    3. Hypotropia of left eye         She has a history of childhood strabismus.  She does not recall which direction they were deviated.  Was patched as a child.  Denies surgery.  Had cataract surgery in 2018.  Ptosis started 2 years ago.  Not sure if there is variability to the ptosis.  Lab workup including acetcylcholine receptor binding antibody, MUSK antibody, and T4 have all been negative.  Denies double vision.  Denies difficulty swallowing or difficulty speaking.  Was noted to have a 35 PD left hypotropia and 10 ET.      Visual acuity 20/20 RIGHT eye and 20/80 left eye.  She has 20 PD left hypotropia and 20 esotropia in primary gaze. MRD1 1.5 millimeters RIGHT eye and 2 millimeters left eye.  Levator function 16 millimeters RIGHT eye and 17 millimeters left eye.  Good orbicularis strength.  Negative ice test.        It is my impression that patient has ptosis, left hypotropia, esotropia.  She has a negative ice test, negative musk antibody and acetylcholine receptor binding antibody. Her strabismus is close to being the same.  She notes variable ptosis. She still may have ocular myasthenia gravis.  Could consider mestinon, observation with repeat measurements, or single fiber EMG.  I discussed these possibilities with her and she wants to pursue single fiber EMG.  I  will arrange. It is also possible this represents Thyroid eye disease.  I will obtain a thyroid stimulating immunoglobulin.          Before Ice     After ice               Again, thank you for allowing me to participate in the care of your patient.      Sincerely,    Jesus Redmond MD  Professor  Ophthalmology Residency   Director of Neuro-Ophthalmology  [employer] Endowed Chair  Departments of Ophthalmology, Neurology, and Neurosurgery  [facility] [address]  [address] [facility], [place]  [address]  T - [phone]  F - [phone]  SHEELA [email or web address]      CC: Mikey Thomas MD  [facility]  [address] [facility]  [address]  [facility] [address]  VIA Facsimile: [phone]     Charmaine Wilson MD  [address] [facility] [address]  [facility] [address]  VIA In Basket     Francia Li RN  VIA In Basket     Cesar Louis MD  [facility] [place] [address]  [facility] [address]  VIA Facsimile: [phone]     Ishmael Gerardo MD  [facility]  [address]  [place] [address]  VIA Facsimile: [phone]

## 2019-03-01 LAB — TSI SER-ACNC: <1 TSI INDEX

## 2019-03-04 ENCOUNTER — OFFICE VISIT (OUTPATIENT)
Dept: NEUROLOGY | Facility: CLINIC | Age: 74
End: 2019-03-04
Payer: MEDICARE

## 2019-03-04 DIAGNOSIS — H02.402 PTOSIS OF LEFT EYELID: Primary | ICD-10-CM

## 2019-03-04 NOTE — LETTER
3/4/2019       RE: Neelima Zuniga  [address] [place] [address]     Dear Colleague,    Thank you for referring your patient, Neelima Zuniga, to the [facility] EMG at [facility]. Please see a copy of my visit note below.        [facility]  Electrodiagnostic Laboratory    Nerve Conduction & EMG Report          Patient: Neelima Zuniga YOB: 1945  Patient ID: [id number] Age: 73 Years 8 Months  Gender: Female      History & Examination: This 73 year-old woman presents with ptosis of the eye lid. This study was requested to evaluate status of neuromuscular transmission.    Techniques:  Single fiber EMG was done with a disposable ALPINE bioMed 25g concentric needle electrode, and voluntary activation of motor units.     Results:  Single fiber EMG of the left orbicularis oculi was of a good technical quality.  The left orbicularis oculi muscle was studied with 15 individual pairs. All individual pairs demonstrated normal jitter ranging from 27 ms to 44  s with normal <45  s in the left orbicularis oculi muscle.  The mean jitter of the 15 pairs in the left orbicularis oculi muscle was 31  s (nl< 32  s). There was no transmission blocking.    Interpretation:    There is no electrodiagnostic evidence for neuromuscular transmission defect.    Comment: This study does not provide evidence to support diagnosis of myasthenia gravis.       Lemuel Thomason MD            SFEMG: L ORB OCULI        SFEMG      Muscle   N Jitter Block MIPI CLAIR MSD FR       s   s  s  s pps   L ORB OCULI 1.1 102 34.56 - 2616.5 34.56 42.59 27    1.2 100 28.78 - 532.52 28.78 33.58 27    2.1 57 44.56 - 588.79 44.56 52.17 37    2.2 48 43.46 - 522.89 43.46 44.48 37    3.1 48 27.18 - 367.60 27.18 27.88 23    4.1 41 33.65 - 510.39 33.65 36.86 20    5.1 96 32.07 - 584.66 32.07 52.62 15    6.1 53 26.68 - 671.79 26.68 39.64 36    7.1 52 31.47 - 458.36 31.47 34.38 32    8.1 31 29.77 - 1426.2  29.77 67.87 20    8.2 124 27.03 - 1434.3 27.03 43.36 20    9.1 44 26.18 - 352.72 26.18 37.22 14    10.1 24 20.78 - 548.29 20.78 28.29 18    11.1 32 33.57 - 277.33 34.64 33.57 20    12.1 109 27.15 - 1426.5 27.15 58.48 18    Mean  31.13 - 821.30 31.20 42.20 24    Median  29.77 - 548.29 29.77 39.64 20    % Blocked   0        Num Pairs 15             Again, thank you for allowing me to participate in the care of your patient.      Sincerely,    Lemuel Thomason MD

## 2019-03-04 NOTE — RESULT ENCOUNTER NOTE
Neelima,    Your thyroid stimulating immunoglobulin was normal.  I am waiting on your single fiber EMG      Thank you for allowing me to share in your care.     Jesus Redmond MD

## 2019-03-04 NOTE — PROGRESS NOTES
[facility]  Electrodiagnostic Laboratory    Nerve Conduction & EMG Report          Patient: Neelima Zuniga YOB: 1945  Patient ID: [id number] Age: 73 Years 8 Months  Gender: Female      History & Examination: This 73 year-old woman presents with ptosis of the eye lid. This study was requested to evaluate status of neuromuscular transmission.    Techniques:  Single fiber EMG was done with a disposable ALPINE bioMed 25g concentric needle electrode, and voluntary activation of motor units.     Results:  Single fiber EMG of the left orbicularis oculi was of a good technical quality.  The left orbicularis oculi muscle was studied with 15 individual pairs. All individual pairs demonstrated normal jitter ranging from 27 ms to 44  s with normal <45  s in the left orbicularis oculi muscle.  The mean jitter of the 15 pairs in the left orbicularis oculi muscle was 31  s (nl< 32  s). There was no transmission blocking.    Interpretation:    There is no electrodiagnostic evidence for neuromuscular transmission defect.    Comment: This study does not provide evidence to support diagnosis of myasthenia gravis.       Lemuel Thomason MD            SFEMG: L ORB OCULI        SFEMG      Muscle   N Jitter Block MIPI CLAIR MSD FR       s   s  s  s pps   L ORB OCULI 1.1 102 34.56 - 2616.5 34.56 42.59 27    1.2 100 28.78 - 532.52 28.78 33.58 27    2.1 57 44.56 - 588.79 44.56 52.17 37    2.2 48 43.46 - 522.89 43.46 44.48 37    3.1 48 27.18 - 367.60 27.18 27.88 23    4.1 41 33.65 - 510.39 33.65 36.86 20    5.1 96 32.07 - 584.66 32.07 52.62 15    6.1 53 26.68 - 671.79 26.68 39.64 36    7.1 52 31.47 - 458.36 31.47 34.38 32    8.1 31 29.77 - 1426.9 29.77 67.87 20    8.2 124 27.03 - 1434.3 27.03 43.36 20    9.1 44 26.18 - 352.72 26.18 37.22 14    10.1 24 20.78 - 548.29 20.78 28.29 18    11.1 32 33.57 - 277.33 34.64 33.57 20    12.1 109 27.15 - 1426.5 27.15 58.48 18    Mean  31.13 - 821.30 31.20 42.20 24    Median   29.77 - 548.29 29.77 39.64 20    % Blocked   0        Num Pairs 15

## 2019-03-11 ENCOUNTER — RECORDS - HEALTHEAST (OUTPATIENT)
Dept: LAB | Facility: CLINIC | Age: 74
End: 2019-03-11

## 2019-03-11 LAB
25(OH)D3 SERPL-MCNC: 33.3 NG/ML (ref 30–80)
ALBUMIN SERPL-MCNC: 4.5 G/DL (ref 3.5–5)
ALP SERPL-CCNC: 74 U/L (ref 45–120)
ALT SERPL W P-5'-P-CCNC: 24 U/L (ref 0–45)
ANION GAP SERPL CALCULATED.3IONS-SCNC: 11 MMOL/L (ref 5–18)
AST SERPL W P-5'-P-CCNC: 19 U/L (ref 0–40)
BASOPHILS # BLD AUTO: 0 THOU/UL (ref 0–0.2)
BASOPHILS NFR BLD AUTO: 1 % (ref 0–2)
BILIRUB SERPL-MCNC: 0.6 MG/DL (ref 0–1)
BUN SERPL-MCNC: 15 MG/DL (ref 8–28)
CALCIUM SERPL-MCNC: 9.7 MG/DL (ref 8.5–10.5)
CHLORIDE BLD-SCNC: 102 MMOL/L (ref 98–107)
CHOLEST SERPL-MCNC: 168 MG/DL
CO2 SERPL-SCNC: 28 MMOL/L (ref 22–31)
CREAT SERPL-MCNC: 0.75 MG/DL (ref 0.6–1.1)
EOSINOPHIL # BLD AUTO: 0.1 THOU/UL (ref 0–0.4)
EOSINOPHIL NFR BLD AUTO: 2 % (ref 0–6)
ERYTHROCYTE [DISTWIDTH] IN BLOOD BY AUTOMATED COUNT: 12.1 % (ref 11–14.5)
FASTING STATUS PATIENT QL REPORTED: YES
GFR SERPL CREATININE-BSD FRML MDRD: >60 ML/MIN/1.73M2
GLUCOSE BLD-MCNC: 117 MG/DL (ref 70–125)
HCT VFR BLD AUTO: 45.4 % (ref 35–47)
HDLC SERPL-MCNC: 46 MG/DL
HGB BLD-MCNC: 14.9 G/DL (ref 12–16)
LDLC SERPL CALC-MCNC: 72 MG/DL
LYMPHOCYTES # BLD AUTO: 2.2 THOU/UL (ref 0.8–4.4)
LYMPHOCYTES NFR BLD AUTO: 37 % (ref 20–40)
MCH RBC QN AUTO: 30.2 PG (ref 27–34)
MCHC RBC AUTO-ENTMCNC: 32.8 G/DL (ref 32–36)
MCV RBC AUTO: 92 FL (ref 80–100)
MONOCYTES # BLD AUTO: 0.7 THOU/UL (ref 0–0.9)
MONOCYTES NFR BLD AUTO: 11 % (ref 2–10)
NEUTROPHILS # BLD AUTO: 2.9 THOU/UL (ref 2–7.7)
NEUTROPHILS NFR BLD AUTO: 49 % (ref 50–70)
PLATELET # BLD AUTO: 310 THOU/UL (ref 140–440)
PMV BLD AUTO: 10 FL (ref 8.5–12.5)
POTASSIUM BLD-SCNC: 4.1 MMOL/L (ref 3.5–5)
PROT SERPL-MCNC: 7 G/DL (ref 6–8)
RBC # BLD AUTO: 4.93 MILL/UL (ref 3.8–5.4)
SODIUM SERPL-SCNC: 141 MMOL/L (ref 136–145)
T4 FREE SERPL-MCNC: 0.9 NG/DL (ref 0.7–1.8)
TRIGL SERPL-MCNC: 250 MG/DL
TSH SERPL DL<=0.005 MIU/L-ACNC: 3.56 UIU/ML (ref 0.3–5)
WBC: 5.9 THOU/UL (ref 4–11)

## 2019-03-13 ENCOUNTER — TELEPHONE (OUTPATIENT)
Dept: OPHTHALMOLOGY | Facility: CLINIC | Age: 74
End: 2019-03-13

## 2019-03-13 NOTE — TELEPHONE ENCOUNTER
Dr. Rodriguez called and spoke to pt about normal results 3-13-19 per Dr. Michael Campos RN 5:11 PM 03/13/19    --      Pt calling about lab results today.  EMG results done last week  Note to Dr. Rodriguez/facilitator to assist in review of lab results for thyroid testing and myasthenia gravis/EMG results and plan of care.    aJime Campos RN 12:05 PM 03/13/19

## 2019-03-13 NOTE — TELEPHONE ENCOUNTER
Called and notified patient that the EMG results and the TSI lab work was normal.    Brady Rodriguez MD  PGY-3 Ophthalmology Resident  [phone]

## 2019-05-09 ENCOUNTER — RECORDS - HEALTHEAST (OUTPATIENT)
Dept: LAB | Facility: CLINIC | Age: 74
End: 2019-05-09

## 2019-05-11 LAB — BACTERIA SPEC CULT: NORMAL

## 2019-05-28 ENCOUNTER — TRANSFERRED RECORDS (OUTPATIENT)
Dept: HEALTH INFORMATION MANAGEMENT | Facility: CLINIC | Age: 74
End: 2019-05-28

## 2019-05-28 ENCOUNTER — HOSPITAL ENCOUNTER (OUTPATIENT)
Dept: MAMMOGRAPHY | Facility: CLINIC | Age: 74
Discharge: HOME | End: 2019-05-28
Attending: INTERNAL MEDICINE

## 2019-05-28 DIAGNOSIS — Z12.31 VISIT FOR SCREENING MAMMOGRAM: ICD-10-CM

## 2019-07-10 ENCOUNTER — TRANSFERRED RECORDS (OUTPATIENT)
Dept: HEALTH INFORMATION MANAGEMENT | Facility: CLINIC | Age: 74
End: 2019-07-10

## 2019-09-10 ENCOUNTER — RECORDS - HEALTHEAST (OUTPATIENT)
Dept: LAB | Facility: CLINIC | Age: 74
End: 2019-09-10

## 2019-09-10 LAB
ALBUMIN SERPL-MCNC: 4.2 G/DL (ref 3.5–5)
ALP SERPL-CCNC: 72 U/L (ref 45–120)
ALT SERPL W P-5'-P-CCNC: 16 U/L (ref 0–45)
ANION GAP SERPL CALCULATED.3IONS-SCNC: 11 MMOL/L (ref 5–18)
AST SERPL W P-5'-P-CCNC: 17 U/L (ref 0–40)
BILIRUB SERPL-MCNC: 0.5 MG/DL (ref 0–1)
BUN SERPL-MCNC: 17 MG/DL (ref 8–28)
CALCIUM SERPL-MCNC: 9.6 MG/DL (ref 8.5–10.5)
CHLORIDE BLD-SCNC: 103 MMOL/L (ref 98–107)
CHOLEST SERPL-MCNC: 141 MG/DL
CO2 SERPL-SCNC: 27 MMOL/L (ref 22–31)
CREAT SERPL-MCNC: 0.71 MG/DL (ref 0.6–1.1)
FASTING STATUS PATIENT QL REPORTED: YES
GFR SERPL CREATININE-BSD FRML MDRD: >60 ML/MIN/1.73M2
GLUCOSE BLD-MCNC: 110 MG/DL (ref 70–125)
HDLC SERPL-MCNC: 41 MG/DL
LDLC SERPL CALC-MCNC: 58 MG/DL
POTASSIUM BLD-SCNC: 4.5 MMOL/L (ref 3.5–5)
PROT SERPL-MCNC: 6.5 G/DL (ref 6–8)
SODIUM SERPL-SCNC: 141 MMOL/L (ref 136–145)
TRIGL SERPL-MCNC: 211 MG/DL

## 2019-09-11 LAB — 25(OH)D3 SERPL-MCNC: 44.1 NG/ML (ref 30–80)

## 2019-09-17 ENCOUNTER — TRANSFERRED RECORDS (OUTPATIENT)
Dept: HEALTH INFORMATION MANAGEMENT | Facility: CLINIC | Age: 74
End: 2019-09-17

## 2019-09-26 ENCOUNTER — ONCOLOGY VISIT (OUTPATIENT)
Dept: ONCOLOGY | Facility: CLINIC | Age: 74
End: 2019-09-26
Attending: INTERNAL MEDICINE
Payer: MEDICARE

## 2019-09-26 VITALS
HEIGHT: 65 IN | WEIGHT: 142 LBS | OXYGEN SATURATION: 95 % | SYSTOLIC BLOOD PRESSURE: 119 MMHG | BODY MASS INDEX: 23.66 KG/M2 | HEART RATE: 76 BPM | RESPIRATION RATE: 16 BRPM | TEMPERATURE: 98.5 F | DIASTOLIC BLOOD PRESSURE: 67 MMHG

## 2019-09-26 DIAGNOSIS — C50.411 MALIGNANT NEOPLASM OF UPPER-OUTER QUADRANT OF RIGHT BREAST IN FEMALE, ESTROGEN RECEPTOR POSITIVE (H): Primary | ICD-10-CM

## 2019-09-26 DIAGNOSIS — Z17.0 MALIGNANT NEOPLASM OF UPPER-OUTER QUADRANT OF RIGHT BREAST IN FEMALE, ESTROGEN RECEPTOR POSITIVE (H): Primary | ICD-10-CM

## 2019-09-26 PROCEDURE — G0463 HOSPITAL OUTPT CLINIC VISIT: HCPCS

## 2019-09-26 PROCEDURE — 99213 OFFICE O/P EST LOW 20 MIN: CPT | Performed by: INTERNAL MEDICINE

## 2019-09-26 ASSESSMENT — PAIN SCALES - GENERAL: PAINLEVEL: NO PAIN (0)

## 2019-09-26 ASSESSMENT — MIFFLIN-ST. JEOR: SCORE: 1144.75

## 2019-09-26 NOTE — NURSING NOTE
"Oncology Rooming Note    September 26, 2019 1:23 PM   Neelima Zuniga is a 74 year old female who presents for:    Chief Complaint   Patient presents with     Oncology Clinic Visit     Malignant neoplasm of right breast      Initial Vitals: /67   Pulse 76   Temp 98.5  F (36.9  C) (Oral)   Resp 16   Ht 1.659 m (5' 5.3\")   Wt 64.4 kg (142 lb)   SpO2 95%   BMI 23.41 kg/m   Estimated body mass index is 23.41 kg/m  as calculated from the following:    Height as of this encounter: 1.659 m (5' 5.3\").    Weight as of this encounter: 64.4 kg (142 lb). Body surface area is 1.72 meters squared.  No Pain (0) Comment: Data Unavailable   No LMP recorded.  Allergies reviewed: Yes  Medications reviewed: Yes    Medications: Medication refills not needed today.  Pharmacy name entered into [facility]: [id number] - [place], [place] - [address]    Clinical concerns: 1 year follow up,pt is also on probiotic       Gloria Merlos CMA              "

## 2019-09-27 NOTE — PROGRESS NOTES
Visit Date:   09/26/2019      HISTORY OF PRESENT ILLNESS:  Ms. Neelima Zuniga is 74 years old, comes in today for interim followup.  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  She was diagnosed in 2007, so she is over 10 years out from her diagnosis.  She initially started preventative therapy with Evista but could not tolerate it.  She continues to have a 3D mammograms done.  Her last mammogram was in 05/2019.  I have reviewed that mammogram today.  It was done at [facility] and was benign without any evidence of suspicious findings.  I have discussed that with her today.  She is actually feeling quite well.  She just had surgery on her eyelid, so she is recovering from that.      REVIEW OF SYSTEMS:  A 14-point comprehensive review of systems is unremarkable.      MEDICATIONS AND ALLERGIES:  Outlined in the nursing records.      PHYSICAL EXAMINATION:   GENERAL:  She is well-appearing lady in no acute distress.   VITAL SIGNS:  Stable.   HEENT:  Oropharynx clear, mucous membranes moist.   NECK:  Has no masses or goiter.   NECK:  There is no cervical, supraclavicular or infraclavicular adenopathy.   CHEST:  Clear to auscultation and percussion bilaterally.   HEART:  Sounds 1, 2 normal.  No added sounds or murmurs.   BREASTS:  Right breast, status post right-sided mastectomy, the scar looks good.  Left breast normal, no palpable masses.  Right and left axillae are negative.   ABDOMEN:  Soft and nontender.   EXTREMITIES:  Legs without tenderness or edema.      DATA REVIEW:  I have reviewed her recent mammogram.      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  She is status post right-sided mastectomy for the DCIS and continues to do yearly mammograms on the left side.  Her next one will be the spring of 2020.  She is feeling well today.  I have answered all of her questions.  I will see her back again in a year.         BRANDY KILPATRICK MD              D: 2019   T: 2019   MT: MARIEL      Name:     SUSAN ROJAS   MRN:      [id number]        Account:      [id number]   :      1945           Visit Date:   2019      Document: [id number]

## 2020-03-10 ENCOUNTER — HEALTH MAINTENANCE LETTER (OUTPATIENT)
Age: 75
End: 2020-03-10

## 2020-06-26 ENCOUNTER — TELEPHONE (OUTPATIENT)
Dept: ONCOLOGY | Facility: CLINIC | Age: 75
End: 2020-06-26

## 2020-06-26 NOTE — TELEPHONE ENCOUNTER
Neelima is calling to verify her appointment for 1 yr f/u with Dr Wilson in September.  She would like to see Dr Wilson in clinic so I moved the appointment to 20.  She also wanted to get her mammogram scheduled as well.  The mammo's ordered on 18 for ma screening left and bilateral amy have .  Can you please re-order these and then we will need to fax the orders to [facility] fax # [phone]

## 2020-06-30 DIAGNOSIS — Z17.0 MALIGNANT NEOPLASM OF UPPER-OUTER QUADRANT OF RIGHT BREAST IN FEMALE, ESTROGEN RECEPTOR POSITIVE (H): Primary | ICD-10-CM

## 2020-06-30 DIAGNOSIS — C50.411 MALIGNANT NEOPLASM OF UPPER-OUTER QUADRANT OF RIGHT BREAST IN FEMALE, ESTROGEN RECEPTOR POSITIVE (H): Primary | ICD-10-CM

## 2020-06-30 DIAGNOSIS — Z12.31 ENCOUNTER FOR SCREENING MAMMOGRAM FOR MALIGNANT NEOPLASM OF BREAST: ICD-10-CM

## 2020-07-01 NOTE — TELEPHONE ENCOUNTER
Dr. Wilson has entered mammogram orders. Orders were faxed to [facility] at [phone].  Also notified pt that orders have been faxed to the breast center. Pt states that the breast center will likely call her within a few days to schedule appt for mammogram. Pt states that if the breast center does not call her, she will give them a call.  Patient verbalized understanding and agreement with plan.  Pt was instructed to call the clinic with any questions, concerns, or worsening symptoms.   Camille Wright, RN, BSN, OCN, CBCN

## 2020-07-02 ENCOUNTER — RECORDS - HEALTHEAST (OUTPATIENT)
Dept: ADMINISTRATIVE | Facility: OTHER | Age: 75
End: 2020-07-02

## 2020-07-17 ENCOUNTER — TRANSFERRED RECORDS (OUTPATIENT)
Dept: HEALTH INFORMATION MANAGEMENT | Facility: CLINIC | Age: 75
End: 2020-07-17

## 2020-07-17 ENCOUNTER — HOSPITAL ENCOUNTER (OUTPATIENT)
Dept: MAMMOGRAPHY | Facility: CLINIC | Age: 75
Discharge: HOME | End: 2020-07-17
Attending: INTERNAL MEDICINE

## 2020-07-17 DIAGNOSIS — Z12.31 VISIT FOR SCREENING MAMMOGRAM: ICD-10-CM

## 2020-09-18 ENCOUNTER — RECORDS - HEALTHEAST (OUTPATIENT)
Dept: LAB | Facility: CLINIC | Age: 75
End: 2020-09-18

## 2020-09-18 LAB
ALBUMIN SERPL-MCNC: 4.6 G/DL (ref 3.5–5)
ALP SERPL-CCNC: 88 U/L (ref 45–120)
ALT SERPL W P-5'-P-CCNC: 23 U/L (ref 0–45)
ANION GAP SERPL CALCULATED.3IONS-SCNC: 11 MMOL/L (ref 5–18)
AST SERPL W P-5'-P-CCNC: 19 U/L (ref 0–40)
BASOPHILS # BLD AUTO: 0.1 THOU/UL (ref 0–0.2)
BASOPHILS NFR BLD AUTO: 1 % (ref 0–2)
BILIRUB SERPL-MCNC: 0.7 MG/DL (ref 0–1)
BUN SERPL-MCNC: 15 MG/DL (ref 8–28)
CALCIUM SERPL-MCNC: 9.8 MG/DL (ref 8.5–10.5)
CHLORIDE BLD-SCNC: 102 MMOL/L (ref 98–107)
CHOLEST SERPL-MCNC: 176 MG/DL
CO2 SERPL-SCNC: 28 MMOL/L (ref 22–31)
CREAT SERPL-MCNC: 0.68 MG/DL (ref 0.6–1.1)
EOSINOPHIL # BLD AUTO: 0.1 THOU/UL (ref 0–0.4)
EOSINOPHIL NFR BLD AUTO: 2 % (ref 0–6)
ERYTHROCYTE [DISTWIDTH] IN BLOOD BY AUTOMATED COUNT: 12.3 % (ref 11–14.5)
FASTING STATUS PATIENT QL REPORTED: YES
GFR SERPL CREATININE-BSD FRML MDRD: >60 ML/MIN/1.73M2
GLUCOSE BLD-MCNC: 130 MG/DL (ref 70–125)
HCT VFR BLD AUTO: 46.9 % (ref 35–47)
HDLC SERPL-MCNC: 38 MG/DL
HGB BLD-MCNC: 15.2 G/DL (ref 12–16)
IMM GRANULOCYTES # BLD: 0 THOU/UL
IMM GRANULOCYTES NFR BLD: 0 %
LDLC SERPL CALC-MCNC: 83 MG/DL
LYMPHOCYTES # BLD AUTO: 1.9 THOU/UL (ref 0.8–4.4)
LYMPHOCYTES NFR BLD AUTO: 34 % (ref 20–40)
MCH RBC QN AUTO: 29.9 PG (ref 27–34)
MCHC RBC AUTO-ENTMCNC: 32.4 G/DL (ref 32–36)
MCV RBC AUTO: 92 FL (ref 80–100)
MONOCYTES # BLD AUTO: 0.7 THOU/UL (ref 0–0.9)
MONOCYTES NFR BLD AUTO: 13 % (ref 2–10)
NEUTROPHILS # BLD AUTO: 2.7 THOU/UL (ref 2–7.7)
NEUTROPHILS NFR BLD AUTO: 50 % (ref 50–70)
PLATELET # BLD AUTO: 315 THOU/UL (ref 140–440)
PMV BLD AUTO: 10.1 FL (ref 8.5–12.5)
POTASSIUM BLD-SCNC: 4.5 MMOL/L (ref 3.5–5)
PROT SERPL-MCNC: 7 G/DL (ref 6–8)
RBC # BLD AUTO: 5.08 MILL/UL (ref 3.8–5.4)
SODIUM SERPL-SCNC: 141 MMOL/L (ref 136–145)
T4 FREE SERPL-MCNC: 0.9 NG/DL (ref 0.7–1.8)
TRIGL SERPL-MCNC: 273 MG/DL
TSH SERPL DL<=0.005 MIU/L-ACNC: 1.84 UIU/ML (ref 0.3–5)
WBC: 5.4 THOU/UL (ref 4–11)

## 2020-09-21 LAB — 25(OH)D3 SERPL-MCNC: 42.3 NG/ML (ref 30–80)

## 2020-12-02 ENCOUNTER — VIRTUAL VISIT (OUTPATIENT)
Dept: ONCOLOGY | Facility: CLINIC | Age: 75
End: 2020-12-02
Attending: INTERNAL MEDICINE
Payer: MEDICARE

## 2020-12-02 DIAGNOSIS — N95.1 MENOPAUSAL SYNDROME (HOT FLASHES): Primary | ICD-10-CM

## 2020-12-02 PROCEDURE — 999N001193 HC VIDEO/TELEPHONE VISIT; NO CHARGE

## 2020-12-02 PROCEDURE — 99441 PR PHYSICIAN TELEPHONE EVALUATION 5-10 MIN: CPT | Mod: 95 | Performed by: INTERNAL MEDICINE

## 2020-12-02 RX ORDER — VENLAFAXINE HYDROCHLORIDE 37.5 MG/1
37.5 TABLET, EXTENDED RELEASE ORAL DAILY
Qty: 60 TABLET | Refills: 1 | Status: SHIPPED | OUTPATIENT
Start: 2020-12-02 | End: 2021-03-24

## 2020-12-02 NOTE — PROGRESS NOTES
"Neelima Zuniga is a 75 year old female who is being evaluated via a billable telephone visit.      The patient has been notified of following:     \"This telephone visit will be conducted via a call between you and your physician/provider. We have found that certain health care needs can be provided without the need for a physical exam.  This service lets us provide the care you need with a short phone conversation.  If a prescription is necessary we can send it directly to your pharmacy.  If lab work is needed we can place an order for that and you can then stop by our lab to have the test done at a later time.    Telephone visits are billed at different rates depending on your insurance coverage. During this emergency period, for some insurers they may be billed the same as an in-person visit.  Please reach out to your insurance provider with any questions.    If during the course of the call the physician/provider feels a telephone visit is not appropriate, you will not be charged for this service.\"    Patient has given verbal consent for Telephone visit?  Yes    What phone number would you like to be contacted at? [phone]    How would you like to obtain your AVS? Milan Ross CMA on 12/2/2020 at 10:16 AM        "

## 2020-12-02 NOTE — LETTER
"    12/2/2020         RE: Neelima Zuniga  [address] [place] [address]        Dear Colleague,    Thank you for referring your patient, Neelima Zuniga, to the [facility] [place]. Please see a copy of my visit note below.    Neelima Zuniga is a 75 year old female who is being evaluated via a billable telephone visit.      The patient has been notified of following:     \"This telephone visit will be conducted via a call between you and your physician/provider. We have found that certain health care needs can be provided without the need for a physical exam.  This service lets us provide the care you need with a short phone conversation.  If a prescription is necessary we can send it directly to your pharmacy.  If lab work is needed we can place an order for that and you can then stop by our lab to have the test done at a later time.    Telephone visits are billed at different rates depending on your insurance coverage. During this emergency period, for some insurers they may be billed the same as an in-person visit.  Please reach out to your insurance provider with any questions.    If during the course of the call the physician/provider feels a telephone visit is not appropriate, you will not be charged for this service.\"    Patient has given verbal consent for Telephone visit?  Yes    What phone number would you like to be contacted at? [phone]    How would you like to obtain your AVS? Milan Ross, ANG on 12/2/2020 at 10:16 AM          Visit Date:   12/02/2020      Neelima Zuniga is being evaluated today by a billable telephone visit due to a public health emergency with coronavirus, and she has given consent.       HISTORY OF PRESENT ILLNESS:  Neelima Zuniga is a 75-year-old patient who is here today for interim followup.  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  She was diagnosed in 2007, so she is over 10 years out from her " diagnosis.  Neelima initially started preventative therapy with Evista, but could not tolerate it.  She continues to get routine mammograms done, and she has done well over the last several years.  Last mammogram was done in 2020.  I reviewed that mammogram today.  It was done at [facility], and that was negative.  She had a right-sided mastectomy.  She is contemplating, once the pandemic is done, getting her right reconstruction redone.  She has no major complaints today.  She is actually feeling quite well.  One issue that she has quite a bit of hot flashes.  I have discussed with her potentially starting Effexor and see if that would help.  Low dose of Effexor has been proven to be quite effective for hot flashes.      REVIEW OF SYSTEMS:  A 14-point comprehensive review of systems is otherwise unremarkable.      MEDICATIONS AND ALLERGIES:  Outlined in the nursing records.      SOCIAL HISTORY:  The patient lives with her .  She is a nonsmoker.      PHYSICAL EXAMINATION:  Has been deferred, because this is a telephone visit.      DATA REVIEW:  I have reviewed her recent mammogram.      IMPRESSION AND PLAN:  Patient with a history of ductal carcinoma in situ as well as lobular carcinoma in situ, status post right-sided mastectomy.  She continues to do yearly mammograms on the left side.  Her last one was normal.  She is having some ongoing hot flashes, so I have added Effexor today to see how she gets on with that.  All going well, I will see her back in person in a year for a full physical exam.  All of the above has been discussed with her, and she is in agreement with the plan.         BRANDY KILPATRICK MD             D: 2020   T: 2020   MT: JHONY      Name:     NEELIMA ROJAS   MRN:      [id number]        Account:      [id number]   :      1945           Visit Date:   2020      Document: [id number]        Again, thank you for allowing me to participate in the care of  your patient.        Sincerely,        Charmaine Wilson MD

## 2020-12-03 NOTE — PROGRESS NOTES
Visit Date:   12/02/2020      Neelima Zuniga is being evaluated today by a billable telephone visit due to a public health emergency with coronavirus, and she has given consent.       HISTORY OF PRESENT ILLNESS:  Neelima Zuniga is a 75-year-old patient who is here today for interim followup.  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  She was diagnosed in 2007, so she is over 10 years out from her diagnosis.  Neelima initially started preventative therapy with Evista, but could not tolerate it.  She continues to get routine mammograms done, and she has done well over the last several years.  Last mammogram was done in 07/2020.  I reviewed that mammogram today.  It was done at [facility], and that was negative.  She had a right-sided mastectomy.  She is contemplating, once the pandemic is done, getting her right reconstruction redone.  She has no major complaints today.  She is actually feeling quite well.  One issue that she has quite a bit of hot flashes.  I have discussed with her potentially starting Effexor and see if that would help.  Low dose of Effexor has been proven to be quite effective for hot flashes.      REVIEW OF SYSTEMS:  A 14-point comprehensive review of systems is otherwise unremarkable.      MEDICATIONS AND ALLERGIES:  Outlined in the nursing records.      SOCIAL HISTORY:  The patient lives with her .  She is a nonsmoker.      PHYSICAL EXAMINATION:  Has been deferred, because this is a telephone visit.      DATA REVIEW:  I have reviewed her recent mammogram.      IMPRESSION AND PLAN:  Patient with a history of ductal carcinoma in situ as well as lobular carcinoma in situ, status post right-sided mastectomy.  She continues to do yearly mammograms on the left side.  Her last one was normal.  She is having some ongoing hot flashes, so I have added Effexor today to see how she gets on with that.  All going well, I will see her back in person in a year for a full physical  exam.  All of the above has been discussed with her, and she is in agreement with the plan.         BRANDY KILPATRICK MD             D: 2020   T: 2020   MT: [id number]      Name:     SUSAN ROJAS   MRN:      [id number]        Account:      [id number]   :      1945           Visit Date:   2020      Document: [id number]

## 2020-12-10 ENCOUNTER — RECORDS - HEALTHEAST (OUTPATIENT)
Dept: LAB | Facility: CLINIC | Age: 75
End: 2020-12-10

## 2020-12-10 LAB
ALBUMIN SERPL-MCNC: 4.5 G/DL (ref 3.5–5)
ALP SERPL-CCNC: 82 U/L (ref 45–120)
ALT SERPL W P-5'-P-CCNC: 18 U/L (ref 0–45)
ANION GAP SERPL CALCULATED.3IONS-SCNC: 10 MMOL/L (ref 5–18)
AST SERPL W P-5'-P-CCNC: 16 U/L (ref 0–40)
BILIRUB SERPL-MCNC: 0.8 MG/DL (ref 0–1)
BUN SERPL-MCNC: 19 MG/DL (ref 8–28)
CALCIUM SERPL-MCNC: 9.3 MG/DL (ref 8.5–10.5)
CHLORIDE BLD-SCNC: 103 MMOL/L (ref 98–107)
CHOLEST SERPL-MCNC: 133 MG/DL
CO2 SERPL-SCNC: 29 MMOL/L (ref 22–31)
CREAT SERPL-MCNC: 0.73 MG/DL (ref 0.6–1.1)
ERYTHROCYTE [DISTWIDTH] IN BLOOD BY AUTOMATED COUNT: 12 % (ref 11–14.5)
FASTING STATUS PATIENT QL REPORTED: YES
GFR SERPL CREATININE-BSD FRML MDRD: >60 ML/MIN/1.73M2
GLUCOSE BLD-MCNC: 124 MG/DL (ref 70–125)
HCT VFR BLD AUTO: 44.6 % (ref 35–47)
HDLC SERPL-MCNC: 42 MG/DL
HGB BLD-MCNC: 15.1 G/DL (ref 12–16)
LDLC SERPL CALC-MCNC: 48 MG/DL
MCH RBC QN AUTO: 30.5 PG (ref 27–34)
MCHC RBC AUTO-ENTMCNC: 33.9 G/DL (ref 32–36)
MCV RBC AUTO: 90 FL (ref 80–100)
PLATELET # BLD AUTO: 281 THOU/UL (ref 140–440)
PMV BLD AUTO: 9.9 FL (ref 8.5–12.5)
POTASSIUM BLD-SCNC: 4.2 MMOL/L (ref 3.5–5)
PROT SERPL-MCNC: 6.9 G/DL (ref 6–8)
RBC # BLD AUTO: 4.95 MILL/UL (ref 3.8–5.4)
SODIUM SERPL-SCNC: 142 MMOL/L (ref 136–145)
TRIGL SERPL-MCNC: 214 MG/DL
WBC: 5.2 THOU/UL (ref 4–11)

## 2021-01-05 ENCOUNTER — RECORDS - HEALTHEAST (OUTPATIENT)
Dept: ADMINISTRATIVE | Facility: OTHER | Age: 76
End: 2021-01-05

## 2021-01-14 ENCOUNTER — OFFICE VISIT - HEALTHEAST (OUTPATIENT)
Dept: CARDIOLOGY | Facility: CLINIC | Age: 76
End: 2021-01-14

## 2021-01-14 DIAGNOSIS — I47.10 SVT (SUPRAVENTRICULAR TACHYCARDIA) (H): ICD-10-CM

## 2021-01-14 DIAGNOSIS — I47.10 PAROXYSMAL SUPRAVENTRICULAR TACHYCARDIA (H): ICD-10-CM

## 2021-01-14 ASSESSMENT — MIFFLIN-ST. JEOR: SCORE: 1129.64

## 2021-01-18 ENCOUNTER — AMBULATORY - HEALTHEAST (OUTPATIENT)
Dept: CARDIOLOGY | Facility: CLINIC | Age: 76
End: 2021-01-18

## 2021-03-24 DIAGNOSIS — N95.1 MENOPAUSAL SYNDROME (HOT FLASHES): ICD-10-CM

## 2021-03-24 NOTE — TELEPHONE ENCOUNTER
Pending Prescriptions:                       Disp   Refills    venlafaxine (EFFEXOR-ER) 37.5 MG 24 hr ta*60 tab*2            Sig: Take 1 tablet (37.5 mg) by mouth daily          Last Written Prescription Date:  12/2/2020  Last Fill Quantity: 60,   # refills: 1  Last Office Visit: 12/2/2020  Future Office visit: 1 year, 12/2021    Routing refill request to ANGEL Galdamez for approval.  Pt taking low dose for hot flashes per Dr Wilson noted in 12/20.    Gale Chopra, RN, BSN, OCN

## 2021-03-25 RX ORDER — VENLAFAXINE HYDROCHLORIDE 37.5 MG/1
37.5 TABLET, EXTENDED RELEASE ORAL DAILY
Qty: 60 TABLET | Refills: 2 | Status: SHIPPED | OUTPATIENT
Start: 2021-03-25 | End: 2022-01-13

## 2021-03-26 DIAGNOSIS — N95.1 MENOPAUSAL SYNDROME (HOT FLASHES): ICD-10-CM

## 2021-03-26 RX ORDER — VENLAFAXINE HYDROCHLORIDE 37.5 MG/1
37.5 CAPSULE, EXTENDED RELEASE ORAL DAILY
Qty: 60 CAPSULE | Refills: 2 | Status: SHIPPED | OUTPATIENT
Start: 2021-03-26 | End: 2021-10-05

## 2021-03-26 NOTE — TELEPHONE ENCOUNTER
Signed Prescriptions:                        Disp   Refills    venlafaxine (EFFEXOR-XR) 37.5 MG 24 hr cap*60 cap*2        Sig: Take 1 capsule (37.5 mg) by mouth daily  Authorizing Provider: MALLORY PHILIP    Rx has been approved by ANGEL Galdamez.  Camille Wright RN, BSN, OCN, CBCN

## 2021-03-26 NOTE — TELEPHONE ENCOUNTER
Fax from [facility] pharmacy. They are requesting a new sript for Venlafaxine 37.5mg CAPSULES as the previous from (tablet) needs a PA.    New med and pharmacy pended.

## 2021-04-13 ENCOUNTER — RECORDS - HEALTHEAST (OUTPATIENT)
Dept: LAB | Facility: CLINIC | Age: 76
End: 2021-04-13

## 2021-04-13 LAB
ALBUMIN SERPL-MCNC: 4.4 G/DL (ref 3.5–5)
ALP SERPL-CCNC: 83 U/L (ref 45–120)
ALT SERPL W P-5'-P-CCNC: 19 U/L (ref 0–45)
ANION GAP SERPL CALCULATED.3IONS-SCNC: 11 MMOL/L (ref 5–18)
AST SERPL W P-5'-P-CCNC: 18 U/L (ref 0–40)
BASOPHILS # BLD AUTO: 0.1 THOU/UL (ref 0–0.2)
BASOPHILS NFR BLD AUTO: 1 % (ref 0–2)
BILIRUB SERPL-MCNC: 0.7 MG/DL (ref 0–1)
BUN SERPL-MCNC: 16 MG/DL (ref 8–28)
CALCIUM SERPL-MCNC: 9 MG/DL (ref 8.5–10.5)
CHLORIDE BLD-SCNC: 100 MMOL/L (ref 98–107)
CHOLEST SERPL-MCNC: 156 MG/DL
CO2 SERPL-SCNC: 27 MMOL/L (ref 22–31)
CREAT SERPL-MCNC: 0.7 MG/DL (ref 0.6–1.1)
EOSINOPHIL # BLD AUTO: 0.2 THOU/UL (ref 0–0.4)
EOSINOPHIL NFR BLD AUTO: 3 % (ref 0–6)
ERYTHROCYTE [DISTWIDTH] IN BLOOD BY AUTOMATED COUNT: 12.2 % (ref 11–14.5)
FASTING STATUS PATIENT QL REPORTED: YES
GFR SERPL CREATININE-BSD FRML MDRD: >60 ML/MIN/1.73M2
GLUCOSE BLD-MCNC: 119 MG/DL (ref 70–125)
HBA1C MFR BLD: 6.1 %
HCT VFR BLD AUTO: 44.6 % (ref 35–47)
HDLC SERPL-MCNC: 42 MG/DL
HGB BLD-MCNC: 15.1 G/DL (ref 12–16)
IMM GRANULOCYTES # BLD: 0 THOU/UL
IMM GRANULOCYTES NFR BLD: 0 %
LDLC SERPL CALC-MCNC: 71 MG/DL
LYMPHOCYTES # BLD AUTO: 2 THOU/UL (ref 0.8–4.4)
LYMPHOCYTES NFR BLD AUTO: 34 % (ref 20–40)
MCH RBC QN AUTO: 30.4 PG (ref 27–34)
MCHC RBC AUTO-ENTMCNC: 33.9 G/DL (ref 32–36)
MCV RBC AUTO: 90 FL (ref 80–100)
MONOCYTES # BLD AUTO: 0.7 THOU/UL (ref 0–0.9)
MONOCYTES NFR BLD AUTO: 11 % (ref 2–10)
NEUTROPHILS # BLD AUTO: 2.9 THOU/UL (ref 2–7.7)
NEUTROPHILS NFR BLD AUTO: 51 % (ref 50–70)
PLATELET # BLD AUTO: 323 THOU/UL (ref 140–440)
PMV BLD AUTO: 9.9 FL (ref 8.5–12.5)
POTASSIUM BLD-SCNC: 4.4 MMOL/L (ref 3.5–5)
PROT SERPL-MCNC: 6.7 G/DL (ref 6–8)
RBC # BLD AUTO: 4.96 MILL/UL (ref 3.8–5.4)
SODIUM SERPL-SCNC: 138 MMOL/L (ref 136–145)
TRIGL SERPL-MCNC: 217 MG/DL
TSH SERPL DL<=0.005 MIU/L-ACNC: 2.03 UIU/ML (ref 0.3–5)
WBC: 5.7 THOU/UL (ref 4–11)

## 2021-04-14 LAB — 25(OH)D3 SERPL-MCNC: 45.8 NG/ML (ref 30–80)

## 2021-04-24 ENCOUNTER — HEALTH MAINTENANCE LETTER (OUTPATIENT)
Age: 76
End: 2021-04-24

## 2021-05-24 ENCOUNTER — RECORDS - HEALTHEAST (OUTPATIENT)
Dept: ADMINISTRATIVE | Facility: CLINIC | Age: 76
End: 2021-05-24

## 2021-05-25 ENCOUNTER — RECORDS - HEALTHEAST (OUTPATIENT)
Dept: ADMINISTRATIVE | Facility: CLINIC | Age: 76
End: 2021-05-25

## 2021-05-26 ENCOUNTER — RECORDS - HEALTHEAST (OUTPATIENT)
Dept: ADMINISTRATIVE | Facility: CLINIC | Age: 76
End: 2021-05-26

## 2021-05-27 ENCOUNTER — RECORDS - HEALTHEAST (OUTPATIENT)
Dept: ADMINISTRATIVE | Facility: CLINIC | Age: 76
End: 2021-05-27

## 2021-05-28 ENCOUNTER — RECORDS - HEALTHEAST (OUTPATIENT)
Dept: ADMINISTRATIVE | Facility: CLINIC | Age: 76
End: 2021-05-28

## 2021-05-29 ENCOUNTER — RECORDS - HEALTHEAST (OUTPATIENT)
Dept: ADMINISTRATIVE | Facility: CLINIC | Age: 76
End: 2021-05-29

## 2021-05-30 ENCOUNTER — RECORDS - HEALTHEAST (OUTPATIENT)
Dept: ADMINISTRATIVE | Facility: CLINIC | Age: 76
End: 2021-05-30

## 2021-06-01 ENCOUNTER — RECORDS - HEALTHEAST (OUTPATIENT)
Dept: ADMINISTRATIVE | Facility: CLINIC | Age: 76
End: 2021-06-01

## 2021-06-02 ENCOUNTER — RECORDS - HEALTHEAST (OUTPATIENT)
Dept: ADMINISTRATIVE | Facility: CLINIC | Age: 76
End: 2021-06-02

## 2021-06-05 VITALS
SYSTOLIC BLOOD PRESSURE: 124 MMHG | RESPIRATION RATE: 20 BRPM | HEIGHT: 64 IN | WEIGHT: 146.3 LBS | HEART RATE: 80 BPM | DIASTOLIC BLOOD PRESSURE: 62 MMHG | BODY MASS INDEX: 24.98 KG/M2

## 2021-06-30 NOTE — PROGRESS NOTES
Progress Notes by Jose Zuniga MD at 1/14/2021  1:50 PM     Author: Jose Zuniga MD Service: -- Author Type: Physician    Filed: 1/14/2021  2:53 PM Encounter Date: 1/14/2021 Status: Signed    : Jose Zuniga MD (Physician)         Thank you, Dr. Gerardo, for asking the [facility] team to see Ms. Neelima Zuniga to evaluate tachycardia.      Assessment/Recommendations   Patient with a history of tachycardia and previously followed with Dr. Jaime Mota.  I cannot find a monitor to suggest AV zain reentry tachycardia but she certainly had symptoms of palpitations which are actually quite a bit improved over the last year.  She does take digoxin 0.25 mg each day and previously took flecainide but she weaned herself off of this.  She does have sleep apnea and some mild enlargement of the right ventricle on previous echocardiogram in 2018.    She is feeling well and although a unique treatment, I think we will continue the digoxin for now as I see no reason that it would be harmful.  At some point later this year would like to repeat an echocardiogram to ensure that her right ventricle is not enlarging further or that she is developing right ventricular systolic dysfunction.  She does have obstructive sleep apnea and certainly we would want to optimize that therapy, especially if she is having changes with the right ventricle.    She had a previous calcium score was 0 in 2015 which is comforting and she does take pravastatin for hyperlipidemia.    Thank you for allowing us to participate in her care.       History of Present Illness/Subjective    Ms. Neelima Zuniga is a 75 y.o. female with history of tachycardia and a diagnosis of SVT.  She has had palpitations in the past which are usually fairly brief in nature, lasting a few minutes and has been treated in the past with flecainide and digoxin.  More recently she is weaned off of the flecainide.  She had much in  increase in her symptoms following the death of her daughter but in the last year her symptoms have been minimal to none.  She has had just fleeting episodes of palpitations.  She works out at [employer] without difficulty.  She has no chest discomfort and also denies unusual shortness of breath with activity, orthopnea, paroxysmal nocturnal dyspnea, peripheral edema, syncope or near syncopal episodes.  She has no history rheumatic fever, cerebrovascular accident or TIA.  She does have a history of a heart murmur and has had some aortic sclerosis on a previous echocardiogram.  She also had a coronary calcium score in 2015 which was 0.    Both parents had heart problems with  in their 90s.    The patient is a retired .  She had 1 daughter who had special needs and  in her late 30s.  She is .           Physical Examination Review of Systems   Vitals:    21 1400   BP: 124/62   Pulse: 80   Resp: 20     Body mass index is 25.31 kg/m .  Wt Readings from Last 3 Encounters:   21 146 lb 4.8 oz (66.4 kg)   01/06/15 150 lb (68 kg)     General Appearance:   Alert, cooperative and in no acute distress.   ENT/Mouth: Patient wearing a mask.      EYES:  no scleral icterus, normal conjunctivae   Neck: JVP normal. No Hepatojugular reflux. Thyroid not visualized.   Chest/Lungs:   Lungs are clear to auscultation, equal chest wall expansion.   Cardiovascular:   S1, S2 with 2/6 systolic murmur , no clicks or rubs. Brachial, radial and posterior tibial pulses are intact and symetric. No carotid bruits noted   Abdomen:  Nontender. BS+.    Extremities: No cyanosis, clubbing or edema   Skin: no xanthelasma, warm.    Neurologic: normal arm movement bilateral, no tremors     Psychiatric: Appropriate affect.      General: WNL  Eyes: WNL  Ears/Nose/Throat: WNL  Lungs: WNL  Heart: Irregular Heartbeat  Stomach: Constipation, Diarrhea  Bladder: WNL  Muscle/Joints: WNL  Skin: WNL  Nervous System:  WNL  Mental Health: WNL     Blood: WNL       Medical History  Surgical History Family History Social History   Past Medical History:   Diagnosis Date   ? Anxiety    ? Breast cancer (H)    ? Cancer (H) 2009    right breast   ? Hyperlipidemia    ? Osteopenia    ? Palpitations    ? Reflux esophagitis    ? Sleep apnea     CPAP   ? Uterine fibroid     Past Surgical History:   Procedure Laterality Date   ? AUGMENTATION MAMMAPLASTY Bilateral    ? BREAST BIOPSY Right 2007   ? BREAST SURGERY Right     reconstruction   ? BREAST SURGERY Left     augmentation   ?  SECTION     ? HYSTERECTOMY     ? MASTECTOMY Right 2007   ? OOPHORECTOMY Bilateral     Family History   Problem Relation Age of Onset   ? Diabetes Mother    ? Hypertension Mother    ? Hypertension Father    ? Hyperlipidemia Father     Social History     Socioeconomic History   ? Marital status:      Spouse name: Not on file   ? Number of children: Not on file   ? Years of education: Not on file   ? Highest education level: Not on file   Occupational History   ? Not on file   Social Needs   ? Financial resource strain: Not on file   ? Food insecurity     Worry: Not on file     Inability: Not on file   ? Transportation needs     Medical: Not on file     Non-medical: Not on file   Tobacco Use   ? Smoking status: Never Smoker   ? Smokeless tobacco: Never Used   Substance and Sexual Activity   ? Alcohol use: Yes   ? Drug use: No   ? Sexual activity: Yes     Partners: Male   Lifestyle   ? Physical activity     Days per week: Not on file     Minutes per session: Not on file   ? Stress: Not on file   Relationships   ? Social connections     Talks on phone: Not on file     Gets together: Not on file     Attends Christianity service: Not on file     Active member of club or organization: Not on file     Attends meetings of clubs or organizations: Not on file     Relationship status: Not on file   ? Intimate partner violence     Fear of current or ex  partner: Not on file     Emotionally abused: Not on file     Physically abused: Not on file     Forced sexual activity: Not on file   Other Topics Concern   ? Not on file   Social History Narrative   ? Not on file          Medications  Allergies   Current Outpatient Medications   Medication Sig Dispense Refill   ? artificial tears,hypromellose, (GENTEAL) 0.3 % Drop Administer 1 drop to both eyes as needed (dry eyes).     ? betamethasone valerate (VALISONE) 0.1 % ointment Apply 1 application topically as needed.     ? calcium carbonate-vit D3-min 600 mg calcium- 400 unit Tab Take 1 tablet by mouth daily.     ? cholecalciferol, vitamin D3, (VITAMIN D3) 50 mcg (2,000 unit) capsule Take 1,000 Units by mouth daily.     ? diclofenac sodium (VOLTAREN) 1 % Gel APPLY 4 GRAMS TO AFFECTED AREA TWICE A DAY     ? digoxin (LANOXIN) 250 mcg (0.25 mg) tablet Take 1 tablet (250 mcg total) by mouth daily. 90 tablet 3   ? glucosamine sulfate 500 mg Tab Take 500 mg by mouth daily.     ? Lactobacillus rhamnosus GG (CULTURELLE) 15 billion cell CpSP Take 1 capsule by mouth daily.     ? magnesium 200 mg Tab Take 400 mg by mouth daily.     ? omeprazole (PRILOSEC) 40 MG capsule Take 40 mg by mouth daily.     ? polyethylene glycol (GLYCOLAX) 17 gram/dose powder Take 1 packet by mouth daily.     ? pravastatin (PRAVACHOL) 40 MG tablet Take 40 mg by mouth bedtime.     ? rosuvastatin (CRESTOR) 20 MG tablet Take 20 mg by mouth at bedtime.     ? venlafaxine (EFFEXOR-XR) 37.5 MG 24 hr capsule Take 37.5 mg by mouth daily.     ? fluticasone (FLONASE) 50 mcg/actuation nasal spray 1 spray into each nostril daily.     ? loratadine (CLARITIN) 10 mg tablet Take 10 mg by mouth daily as needed.      ? sodium chloride (OCEAN) 0.65 % nasal spray 1 spray into each nostril as needed for congestion.       No current facility-administered medications for this visit.     Allergies   Allergen Reactions   ? Erythromycin Nausea Only   ? Metoprolol Succinate Nausea  Only   ? Sulfa (Sulfonamide Antibiotics) Hives         Lab Results    Chemistry/lipid CBC Cardiac Enzymes/BNP/TSH/INR   Lab Results   Component Value Date    CHOL 133 12/10/2020    HDL 42 (L) 12/10/2020    LDLCALC 48 12/10/2020    TRIG 214 (H) 12/10/2020    CREATININE 0.73 12/10/2020    BUN 19 12/10/2020    K 4.2 12/10/2020     12/10/2020     12/10/2020    CO2 29 12/10/2020    Lab Results   Component Value Date    WBC 5.2 12/10/2020    HGB 15.1 12/10/2020    HCT 44.6 12/10/2020    MCV 90 12/10/2020     12/10/2020    Lab Results   Component Value Date    TSH 1.84 09/18/2020

## 2021-07-07 ENCOUNTER — TELEPHONE (OUTPATIENT)
Dept: ONCOLOGY | Facility: CLINIC | Age: 76
End: 2021-07-07

## 2021-07-07 NOTE — TELEPHONE ENCOUNTER
Patient called and would like referral sent to [facility] for 3D imaging order from Dr. Wilson to be done. Please call when done, if no answer it's ok to leave detailed message.    Paola Ross CMA on 7/7/2021 at 9:09 AM

## 2021-07-08 DIAGNOSIS — Z17.0 MALIGNANT NEOPLASM OF UPPER-OUTER QUADRANT OF RIGHT BREAST IN FEMALE, ESTROGEN RECEPTOR POSITIVE (H): Primary | ICD-10-CM

## 2021-07-08 DIAGNOSIS — Z12.31 ENCOUNTER FOR SCREENING MAMMOGRAM FOR BREAST CANCER: ICD-10-CM

## 2021-07-08 DIAGNOSIS — C50.411 MALIGNANT NEOPLASM OF UPPER-OUTER QUADRANT OF RIGHT BREAST IN FEMALE, ESTROGEN RECEPTOR POSITIVE (H): Primary | ICD-10-CM

## 2021-07-09 NOTE — TELEPHONE ENCOUNTER
Mammogram order was faxed to [facility] at [phone].  Left pt a message to let her know that mammogram orders have been faxed.  Please call the clinic with any questions.  Camille Wright RN, BSN, OCN, CBCN

## 2021-07-13 ENCOUNTER — RECORDS - HEALTHEAST (OUTPATIENT)
Dept: ADMINISTRATIVE | Facility: CLINIC | Age: 76
End: 2021-07-13

## 2021-07-21 ENCOUNTER — RECORDS - HEALTHEAST (OUTPATIENT)
Dept: ADMINISTRATIVE | Facility: CLINIC | Age: 76
End: 2021-07-21

## 2021-07-27 ENCOUNTER — HOSPITAL ENCOUNTER (OUTPATIENT)
Dept: CARDIOLOGY | Facility: HOSPITAL | Age: 76
Discharge: HOME | End: 2021-07-27
Attending: INTERNAL MEDICINE | Admitting: INTERNAL MEDICINE
Payer: MEDICARE

## 2021-07-27 DIAGNOSIS — I47.10 SVT (SUPRAVENTRICULAR TACHYCARDIA) (H): ICD-10-CM

## 2021-07-27 DIAGNOSIS — I47.10 PAROXYSMAL SUPRAVENTRICULAR TACHYCARDIA (H): ICD-10-CM

## 2021-07-27 LAB — LVEF ECHO: NORMAL

## 2021-07-27 PROCEDURE — 93306 TTE W/DOPPLER COMPLETE: CPT | Mod: 26 | Performed by: INTERNAL MEDICINE

## 2021-07-27 PROCEDURE — 93306 TTE W/DOPPLER COMPLETE: CPT

## 2021-08-18 ENCOUNTER — ANCILLARY PROCEDURE (OUTPATIENT)
Dept: MAMMOGRAPHY | Facility: CLINIC | Age: 76
End: 2021-08-18
Attending: PHYSICIAN ASSISTANT
Payer: MEDICARE

## 2021-08-18 DIAGNOSIS — Z12.31 ENCOUNTER FOR SCREENING MAMMOGRAM FOR BREAST CANCER: ICD-10-CM

## 2021-08-18 DIAGNOSIS — C50.411 MALIGNANT NEOPLASM OF UPPER-OUTER QUADRANT OF RIGHT BREAST IN FEMALE, ESTROGEN RECEPTOR POSITIVE (H): ICD-10-CM

## 2021-08-18 DIAGNOSIS — Z17.0 MALIGNANT NEOPLASM OF UPPER-OUTER QUADRANT OF RIGHT BREAST IN FEMALE, ESTROGEN RECEPTOR POSITIVE (H): ICD-10-CM

## 2021-08-18 PROCEDURE — 77063 BREAST TOMOSYNTHESIS BI: CPT | Mod: 52

## 2021-10-04 ENCOUNTER — HEALTH MAINTENANCE LETTER (OUTPATIENT)
Age: 76
End: 2021-10-04

## 2021-10-05 DIAGNOSIS — N95.1 MENOPAUSAL SYNDROME (HOT FLASHES): ICD-10-CM

## 2021-10-05 RX ORDER — VENLAFAXINE HYDROCHLORIDE 37.5 MG/1
CAPSULE, EXTENDED RELEASE ORAL
Qty: 90 CAPSULE | Refills: 1 | Status: SHIPPED | OUTPATIENT
Start: 2021-10-05 | End: 2022-01-13

## 2021-10-05 NOTE — TELEPHONE ENCOUNTER
Pending Prescriptions:                       Disp   Refills    venlafaxine (EFFEXOR-XR) 37.5 MG 24 hr ca*90 cap*1            Sig: TAKE 1 CAPSULE BY MOUTH EVERY DAY    VENLAFAXINE (EFFEXOR-XR) 37.5MG  Last Written Prescription Date:  3/26/21  Last Fill Quantity: 60,   # refills: 2  Last Office Visit: 12/2/20  Future Office visit:       Routing refill request to provider for review/approval.

## 2021-10-19 ENCOUNTER — LAB REQUISITION (OUTPATIENT)
Dept: LAB | Facility: CLINIC | Age: 76
End: 2021-10-19
Payer: MEDICARE

## 2021-10-19 DIAGNOSIS — M85.80 OTHER SPECIFIED DISORDERS OF BONE DENSITY AND STRUCTURE, UNSPECIFIED SITE: ICD-10-CM

## 2021-10-19 DIAGNOSIS — E78.5 HYPERLIPIDEMIA, UNSPECIFIED: ICD-10-CM

## 2021-10-19 LAB
ALBUMIN SERPL-MCNC: 4.2 G/DL (ref 3.5–5)
ALP SERPL-CCNC: 80 U/L (ref 45–120)
ALT SERPL W P-5'-P-CCNC: 23 U/L (ref 0–45)
ANION GAP SERPL CALCULATED.3IONS-SCNC: 9 MMOL/L (ref 5–18)
AST SERPL W P-5'-P-CCNC: 16 U/L (ref 0–40)
BASOPHILS # BLD AUTO: 0 10E3/UL (ref 0–0.2)
BASOPHILS NFR BLD AUTO: 1 %
BILIRUB SERPL-MCNC: 0.6 MG/DL (ref 0–1)
BUN SERPL-MCNC: 21 MG/DL (ref 8–28)
CALCIUM SERPL-MCNC: 9.8 MG/DL (ref 8.5–10.5)
CHLORIDE BLD-SCNC: 103 MMOL/L (ref 98–107)
CHOLEST SERPL-MCNC: 190 MG/DL
CO2 SERPL-SCNC: 30 MMOL/L (ref 22–31)
CREAT SERPL-MCNC: 0.68 MG/DL (ref 0.6–1.1)
EOSINOPHIL # BLD AUTO: 0.2 10E3/UL (ref 0–0.7)
EOSINOPHIL NFR BLD AUTO: 3 %
ERYTHROCYTE [DISTWIDTH] IN BLOOD BY AUTOMATED COUNT: 12.3 % (ref 10–15)
GFR SERPL CREATININE-BSD FRML MDRD: 85 ML/MIN/1.73M2
GLUCOSE BLD-MCNC: 125 MG/DL (ref 70–125)
HCT VFR BLD AUTO: 44.2 % (ref 35–47)
HDLC SERPL-MCNC: 40 MG/DL
HGB BLD-MCNC: 14.7 G/DL (ref 11.7–15.7)
IMM GRANULOCYTES # BLD: 0 10E3/UL
IMM GRANULOCYTES NFR BLD: 0 %
LDLC SERPL CALC-MCNC: 103 MG/DL
LYMPHOCYTES # BLD AUTO: 1.9 10E3/UL (ref 0.8–5.3)
LYMPHOCYTES NFR BLD AUTO: 32 %
MCH RBC QN AUTO: 30.6 PG (ref 26.5–33)
MCHC RBC AUTO-ENTMCNC: 33.3 G/DL (ref 31.5–36.5)
MCV RBC AUTO: 92 FL (ref 78–100)
MONOCYTES # BLD AUTO: 0.8 10E3/UL (ref 0–1.3)
MONOCYTES NFR BLD AUTO: 14 %
NEUTROPHILS # BLD AUTO: 3 10E3/UL (ref 1.6–8.3)
NEUTROPHILS NFR BLD AUTO: 50 %
NRBC # BLD AUTO: 0 10E3/UL
NRBC BLD AUTO-RTO: 0 /100
PLATELET # BLD AUTO: 287 10E3/UL (ref 150–450)
POTASSIUM BLD-SCNC: 4.5 MMOL/L (ref 3.5–5)
PROT SERPL-MCNC: 6.6 G/DL (ref 6–8)
RBC # BLD AUTO: 4.8 10E6/UL (ref 3.8–5.2)
SODIUM SERPL-SCNC: 142 MMOL/L (ref 136–145)
T4 FREE SERPL-MCNC: 0.89 NG/DL (ref 0.7–1.8)
TRIGL SERPL-MCNC: 235 MG/DL
TSH SERPL DL<=0.005 MIU/L-ACNC: 1.76 UIU/ML (ref 0.3–5)
WBC # BLD AUTO: 5.9 10E3/UL (ref 4–11)

## 2021-10-19 PROCEDURE — 84439 ASSAY OF FREE THYROXINE: CPT | Mod: ORL | Performed by: FAMILY MEDICINE

## 2021-10-19 PROCEDURE — 82306 VITAMIN D 25 HYDROXY: CPT | Mod: ORL | Performed by: FAMILY MEDICINE

## 2021-10-19 PROCEDURE — 80053 COMPREHEN METABOLIC PANEL: CPT | Mod: ORL | Performed by: FAMILY MEDICINE

## 2021-10-19 PROCEDURE — 80061 LIPID PANEL: CPT | Mod: ORL | Performed by: FAMILY MEDICINE

## 2021-10-19 PROCEDURE — 85025 COMPLETE CBC W/AUTO DIFF WBC: CPT | Mod: ORL | Performed by: FAMILY MEDICINE

## 2021-10-19 PROCEDURE — 84443 ASSAY THYROID STIM HORMONE: CPT | Mod: ORL | Performed by: FAMILY MEDICINE

## 2021-10-20 LAB — DEPRECATED CALCIDIOL+CALCIFEROL SERPL-MC: 65 UG/L (ref 30–80)

## 2022-01-13 ENCOUNTER — ONCOLOGY VISIT (OUTPATIENT)
Dept: ONCOLOGY | Facility: CLINIC | Age: 77
End: 2022-01-13
Attending: INTERNAL MEDICINE
Payer: MEDICARE

## 2022-01-13 VITALS
RESPIRATION RATE: 16 BRPM | BODY MASS INDEX: 26.46 KG/M2 | WEIGHT: 155 LBS | HEART RATE: 82 BPM | TEMPERATURE: 98 F | HEIGHT: 64 IN | SYSTOLIC BLOOD PRESSURE: 139 MMHG | OXYGEN SATURATION: 94 % | DIASTOLIC BLOOD PRESSURE: 67 MMHG

## 2022-01-13 DIAGNOSIS — Z17.0 MALIGNANT NEOPLASM OF UPPER-OUTER QUADRANT OF RIGHT BREAST IN FEMALE, ESTROGEN RECEPTOR POSITIVE (H): Primary | ICD-10-CM

## 2022-01-13 DIAGNOSIS — C50.411 MALIGNANT NEOPLASM OF UPPER-OUTER QUADRANT OF RIGHT BREAST IN FEMALE, ESTROGEN RECEPTOR POSITIVE (H): Primary | ICD-10-CM

## 2022-01-13 DIAGNOSIS — Z12.31 ENCOUNTER FOR SCREENING MAMMOGRAM FOR MALIGNANT NEOPLASM OF BREAST: ICD-10-CM

## 2022-01-13 DIAGNOSIS — N95.1 MENOPAUSAL SYNDROME (HOT FLASHES): ICD-10-CM

## 2022-01-13 PROCEDURE — G0463 HOSPITAL OUTPT CLINIC VISIT: HCPCS

## 2022-01-13 PROCEDURE — 99213 OFFICE O/P EST LOW 20 MIN: CPT | Performed by: INTERNAL MEDICINE

## 2022-01-13 RX ORDER — VENLAFAXINE HYDROCHLORIDE 37.5 MG/1
37.5 CAPSULE, EXTENDED RELEASE ORAL DAILY
Qty: 90 CAPSULE | Refills: 3 | Status: SHIPPED | OUTPATIENT
Start: 2022-01-13 | End: 2023-03-27

## 2022-01-13 ASSESSMENT — MIFFLIN-ST. JEOR: SCORE: 1173.08

## 2022-01-13 ASSESSMENT — PAIN SCALES - GENERAL: PAINLEVEL: NO PAIN (0)

## 2022-01-13 NOTE — PROGRESS NOTES
Visit Date: 2022    Neelima Zuniga is a 76-year-old patient who is here today for interim followup.    CHIEF COMPLAINTS:  1.  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  2.  Attempted chemoprevention with Evista, but could not tolerate it.    HISTORY OF PRESENT ILLNESS:  Neelima is 76 years old.  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  She is status post right-sided mastectomy with reconstruction.  She did attempt to have Evista for prevention back when she was diagnosed, but she could not tolerate it.  She does come in on a yearly basis for physical exam and reevaluation.  Unfortunately, a few years back, her daughter  suddenly and just in  her  .  I have given her some emotional support for this today.    COMPREHENSIVE REVIEW OF SYSTEMS:  On 10-point comprehensive review of systems, her overall health is good and she has no worrisome symptomatology from my standpoint.  She does tend to keep busy and exercises.    MEDICATIONS AND ALLERGIES:  Outlined in the nursing records.    SOCIAL HISTORY:  The patient is now a .  She is a nonsmoker.    PHYSICAL EXAMINATION:    GENERAL:  She is a well-appearing lady in no acute distress.  VITAL SIGNS:  Stable.  Weight is 155 pounds.  NECK:  No masses or goiter.  LYMPHATICS:  There is no cervical, supraclavicular, infraclavicular adenopathy.  CHEST:  Clear to auscultation and percussion bilaterally.  HEART:  Sounds 1, 2 normal.  No added sounds.  No murmurs.  BREASTS:  Right breast:  Status post right-sided mastectomy with implant.  Left breast:  No palpable masses.  Left axilla negative.  ABDOMEN:  Soft and nontender.  No hepatosplenomegaly.  EXTREMITIES:  Legs without tenderness or edema.  NEUROLOGIC:  The patient is alert and oriented x3.  SKIN:  No rashes or lesions.    DATA REVIEW:  She is up to date on blood work, which was done in October.  Her next mammogram will be due in the summer of  on the left  side,    IMPRESSION:  The patient with DCIS and LCIS, status post right-sided mastectomy.  She continues to do yearly mammograms on the left side.  She will continue on this.  She is on Effexor for some hot flashes and some anxiety, which helps her a lot.  I will continue to see her on a yearly or p.r.n. basis.    Charmaine Wilson MD        D: 2022   T: 2022   MT: [id number]    Name:     SUSAN ROJASLesly  MRN:      [id number]        Account:    [id number]   :      1945           Visit Date: 2022     Document: [id number]

## 2022-01-13 NOTE — NURSING NOTE
"Oncology Rooming Note    January 13, 2022 10:41 AM   Neelima Zuniga is a 76 year old female who presents for:    Chief Complaint   Patient presents with     Oncology Clinic Visit     Malignant neoplasm of right breast      Initial Vitals: /67   Pulse 82   Temp 98  F (36.7  C) (Tympanic)   Resp 16   Ht 1.626 m (5' 4\")   Wt 70.3 kg (155 lb)   SpO2 94%   BMI 26.61 kg/m   Estimated body mass index is 26.61 kg/m  as calculated from the following:    Height as of this encounter: 1.626 m (5' 4\").    Weight as of this encounter: 70.3 kg (155 lb). Body surface area is 1.78 meters squared.  No Pain (0) Comment: Data Unavailable   No LMP recorded.  Allergies reviewed: Yes  Medications reviewed: Yes    Medications: MEDICATION REFILLS NEEDED TODAY. Provider was notified. Venlafaxine ER 37.5 MG   Pharmacy name entered into [facility]: [id number] - [place], [place] - [address]    Clinical concerns: F/U       Italia Bracnh, ANG              "

## 2022-01-13 NOTE — LETTER
2022         RE: Neelima Zuniga  [address]  [place] [address]        Dear Colleague,    Thank you for referring your patient, Neelima Zuniga, to the [facility] [place]. Please see a copy of my visit note below.    Visit Date: 2022    Neelima Zuniga is a 76-year-old patient who is here today for interim followup.    CHIEF COMPLAINTS:  1.  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  2.  Attempted chemoprevention with Evista, but could not tolerate it.    HISTORY OF PRESENT ILLNESS:  Neelima is 76 years old.  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  She is status post right-sided mastectomy with reconstruction.  She did attempt to have Evista for prevention back when she was diagnosed, but she could not tolerate it.  She does come in on a yearly basis for physical exam and reevaluation.  Unfortunately, a few years back, her daughter  suddenly and just in  her  .  I have given her some emotional support for this today.    COMPREHENSIVE REVIEW OF SYSTEMS:  On 10-point comprehensive review of systems, her overall health is good and she has no worrisome symptomatology from my standpoint.  She does tend to keep busy and exercises.    MEDICATIONS AND ALLERGIES:  Outlined in the nursing records.    SOCIAL HISTORY:  The patient is now a .  She is a nonsmoker.    PHYSICAL EXAMINATION:    GENERAL:  She is a well-appearing lady in no acute distress.  VITAL SIGNS:  Stable.  Weight is 155 pounds.  NECK:  No masses or goiter.  LYMPHATICS:  There is no cervical, supraclavicular, infraclavicular adenopathy.  CHEST:  Clear to auscultation and percussion bilaterally.  HEART:  Sounds 1, 2 normal.  No added sounds.  No murmurs.  BREASTS:  Right breast:  Status post right-sided mastectomy with implant.  Left breast:  No palpable masses.  Left axilla negative.  ABDOMEN:  Soft and nontender.  No hepatosplenomegaly.  EXTREMITIES:  Legs without  tenderness or edema.  NEUROLOGIC:  The patient is alert and oriented x3.  SKIN:  No rashes or lesions.    DATA REVIEW:  She is up to date on blood work, which was done in October.  Her next mammogram will be due in the summer of  on the left side,    IMPRESSION:  The patient with DCIS and LCIS, status post right-sided mastectomy.  She continues to do yearly mammograms on the left side.  She will continue on this.  She is on Effexor for some hot flashes and some anxiety, which helps her a lot.  I will continue to see her on a yearly or p.r.n. basis.    Charmaine Wilson MD        D: 2022   T: 2022   MT: [id number]    Name:     SUSAN ROJAS  MRN:      [id number]        Account:    [id number]   :      1945           Visit Date: 2022     Document: [id number]      Again, thank you for allowing me to participate in the care of your patient.        Sincerely,        Charmaine Wilson MD

## 2022-01-14 ENCOUNTER — OFFICE VISIT (OUTPATIENT)
Dept: CARDIOLOGY | Facility: CLINIC | Age: 77
End: 2022-01-14
Payer: MEDICARE

## 2022-01-14 VITALS
BODY MASS INDEX: 26.22 KG/M2 | HEART RATE: 92 BPM | SYSTOLIC BLOOD PRESSURE: 130 MMHG | WEIGHT: 153.6 LBS | DIASTOLIC BLOOD PRESSURE: 64 MMHG | HEIGHT: 64 IN | RESPIRATION RATE: 20 BRPM

## 2022-01-14 DIAGNOSIS — I47.10 PAROXYSMAL SUPRAVENTRICULAR TACHYCARDIA (H): Primary | ICD-10-CM

## 2022-01-14 DIAGNOSIS — R07.89 FEELING OF CHEST TIGHTNESS: ICD-10-CM

## 2022-01-14 PROCEDURE — 99214 OFFICE O/P EST MOD 30 MIN: CPT | Performed by: INTERNAL MEDICINE

## 2022-01-14 RX ORDER — DIGOXIN 250 MCG
0.25 TABLET ORAL DAILY
Qty: 90 TABLET | Refills: 3 | Status: SHIPPED | OUTPATIENT
Start: 2022-01-14 | End: 2022-11-29

## 2022-01-14 ASSESSMENT — MIFFLIN-ST. JEOR: SCORE: 1166.73

## 2022-01-14 NOTE — LETTER
1/14/2022    Ishmael Gerardo MD  [facility] [address]  [place] [address]    RE: Neelima Zuniga       Dear Colleague,     I had the pleasure of seeing Neelima Zuniga in the [facility].      [facility]  [phone]      Assessment/Recommendations   Patient with known hyperlipidemia as well as SVT which has been successfully treated with digoxin for several years.  We will renew her digoxin.  She lost her  this past year and has had some chest tightness which I suspect is more stress but I certainly cannot exclude cardiac etiology so we will get a stress echocardiogram.    I have encouraged her to maintain an active lifestyle and to call us if she has new symptoms and we will certainly call her with results of the stress echocardiogram and any further recommendations.    Thank you for allowing us to participate in her care.       History of Present Illness/Subjective    Ms. Neelima Zuniga is a 76 year old female with known hyperlipidemia as well as sleep apnea and a history of supraventricular tachycardia.  The SVT has been well treated with digoxin for several years.  She has felt well from a palpitation standpoint this past year but she lost her  and she has noticed some intermittent chest tightness which will come on usually when she is at home and lasts somewhere between 2 and 5 minutes.  It then abates on its own.  It is not associated with diaphoresis, nausea, or shortness of breath.  She wonders if it is stress related.  She does not seem to get it when she is at the [facility] doing her workouts.    She may have some mild increase shortness of breath with activity but not dramatic.  She denies orthopnea or paroxysmal nocturnal dyspnea but uses her CPAP mask religiously.  Echocardiogram last year showed normal left ventricular systolic function.  Normal right ventricular size and systolic function.       Physical  "Examination Review of Systems   /64 (BP Location: Left arm, Patient Position: Sitting, Cuff Size: Adult Regular)   Pulse 92   Resp 20   Ht 1.626 m (5' 4\")   Wt 69.7 kg (153 lb 9.6 oz)   BMI 26.37 kg/m    Body mass index is 26.37 kg/m .  Wt Readings from Last 3 Encounters:   22 69.7 kg (153 lb 9.6 oz)   22 70.3 kg (155 lb)   21 66.4 kg (146 lb 4.8 oz)     General Appearance:   Alert, cooperative and in no acute distress.   ENT/Mouth: Patient wearing a mask.      EYES:  no scleral icterus, normal conjunctivae   Neck: JVP normal. No Hepatojugular reflux. Thyroid not visualized.   Chest/Lungs:   Lungs are clear to auscultation, equal chest wall expansion.   Cardiovascular:   S1, S2 with 2/6 systolic murmur , no clicks or rubs. Brachial, radial  pulses are intact and symetric. No carotid bruits noted   Abdomen:  Nontender. BS+.    Extremities: No cyanosis, clubbing  and mild ankle edema   Skin: no xanthelasma, warm.    Neurologic: normal arm movement bilateral, no tremors     Psychiatric: Appropriate affect.      Enc Vitals  BP: 130/64  Pulse: 92  Resp: 20  Weight: 69.7 kg (153 lb 9.6 oz)  Height: 162.6 cm (5' 4\")                                           Medical History  Surgical History Family History Social History   Past Medical History:   Diagnosis Date     Amblyopia     left eye     Anxiety      Breast cancer (H) 2007     Cancer (H) 2009    right breast     Hyperlipidemia      Osteopenia      Palpitations      Reflux esophagitis      Sleep apnea     CPAP     Uterine fibroid     Past Surgical History:   Procedure Laterality Date     BIOPSY BREAST Right 2007     BREAST SURGERY Right     reconstruction     BREAST SURGERY Left     augmentation     CATARACT IOL, RT/LT        SECTION       HYSTERECTOMY  2015     MAMMOPLASTY AUGMENTATION Bilateral      MASTECTOMY Right 2007     OOPHORECTOMY Bilateral     Family History   Problem Relation Age of Onset     Macular Degeneration " Father      Glaucoma Father      Glaucoma Paternal Grandmother      Diabetes Mother      Hypertension Mother      Hypertension Father      Hyperlipidemia Father     Social History     Socioeconomic History     Marital status:      Spouse name: Not on file     Number of children: Not on file     Years of education: Not on file     Highest education level: Not on file   Occupational History     Not on file   Tobacco Use     Smoking status: Never Smoker     Smokeless tobacco: Never Used   Substance and Sexual Activity     Alcohol use: Yes     Drug use: No     Sexual activity: Yes     Partners: Male   Other Topics Concern     Parent/sibling w/ CABG, MI or angioplasty before 65F 55M? Not Asked   Social History Narrative     Not on file     Social Determinants of Health     Financial Resource Strain: Not on file   Food Insecurity: Not on file   Transportation Needs: Not on file   Physical Activity: Not on file   Stress: Not on file   Social Connections: Not on file   Intimate Partner Violence: Not At Risk     Fear of Current or Ex-Partner: No     Emotionally Abused: No     Physically Abused: No     Sexually Abused: No   Housing Stability: Not on file          Medications  Allergies   Current Outpatient Medications   Medication Sig Dispense Refill     calcium-vitamin D (CALTRATE) 600-400 MG-UNIT per tablet Take 1 tablet by mouth 2 times daily       digoxin (LANOXIN) 250 MCG tablet Take 1 tablet (0.25 mg) by mouth daily 90 tablet 3     ESZOPICLONE PO Take 2 mg by mouth nightly as needed for sleep       GLUCOSAMINE SULFATE PO Take 1,500 mg by mouth       MAGNESIUM OXIDE PO        OMEPRAZOLE PO Take 40 mg by mouth every morning       polyethylene glycol (MIRALAX/GLYCOLAX) packet Take 1 packet by mouth daily       ROSUVASTATIN CALCIUM PO Take 20 mg by mouth       venlafaxine (EFFEXOR-XR) 37.5 MG 24 hr capsule Take 1 capsule (37.5 mg) by mouth daily 90 capsule 3     Lactobacillus Rhamnosus, GG, (RA PROBIOTIC DIGESTIVE  CARE) CAPS Take 1 capsule by mouth daily      Allergies   Allergen Reactions     Erythromycin      Metoprolol Nausea     Sulfa Drugs Rash         Lab Results    Chemistry/lipid CBC Cardiac Enzymes/BNP/TSH/INR   Lab Results   Component Value Date    CHOL 190 10/19/2021    HDL 40 (L) 10/19/2021    TRIG 235 (H) 10/19/2021    BUN 21 10/19/2021     10/19/2021    CO2 30 10/19/2021    Lab Results   Component Value Date    WBC 5.9 10/19/2021    HGB 14.7 10/19/2021    HCT 44.2 10/19/2021    MCV 92 10/19/2021     10/19/2021    Lab Results   Component Value Date    TSH 1.76 10/19/2021

## 2022-01-18 NOTE — PATIENT INSTRUCTIONS
RTC MD 1 year -- 1/12/23 at 10am  Mammogram August 2022 -- patient prefers to complete at [facility]    Francia Myers, RN, BSN, OCN  Nurse Care Coordinator  [facility]  P: [phone]     F: [phone]

## 2022-01-25 ENCOUNTER — HOSPITAL ENCOUNTER (OUTPATIENT)
Dept: CARDIOLOGY | Facility: HOSPITAL | Age: 77
Discharge: HOME | End: 2022-01-25
Attending: INTERNAL MEDICINE | Admitting: INTERNAL MEDICINE
Payer: MEDICARE

## 2022-01-25 DIAGNOSIS — I47.10 PAROXYSMAL SUPRAVENTRICULAR TACHYCARDIA (H): ICD-10-CM

## 2022-01-25 DIAGNOSIS — R07.89 FEELING OF CHEST TIGHTNESS: ICD-10-CM

## 2022-01-25 PROCEDURE — 93321 DOPPLER ECHO F-UP/LMTD STD: CPT | Mod: TC

## 2022-01-25 PROCEDURE — 255N000002 HC RX 255 OP 636: Performed by: INTERNAL MEDICINE

## 2022-01-25 PROCEDURE — 93325 DOPPLER ECHO COLOR FLOW MAPG: CPT | Mod: TC

## 2022-01-25 PROCEDURE — 93350 STRESS TTE ONLY: CPT | Mod: 26 | Performed by: INTERNAL MEDICINE

## 2022-01-25 PROCEDURE — 93325 DOPPLER ECHO COLOR FLOW MAPG: CPT | Mod: 26 | Performed by: INTERNAL MEDICINE

## 2022-01-25 PROCEDURE — 93018 CV STRESS TEST I&R ONLY: CPT | Performed by: INTERNAL MEDICINE

## 2022-01-25 PROCEDURE — 93016 CV STRESS TEST SUPVJ ONLY: CPT | Performed by: INTERNAL MEDICINE

## 2022-01-25 PROCEDURE — 93321 DOPPLER ECHO F-UP/LMTD STD: CPT | Mod: 26 | Performed by: INTERNAL MEDICINE

## 2022-01-25 RX ADMIN — PERFLUTREN 5 ML: 6.52 INJECTION, SUSPENSION INTRAVENOUS at 08:40

## 2022-02-18 ENCOUNTER — LAB REQUISITION (OUTPATIENT)
Dept: LAB | Facility: CLINIC | Age: 77
End: 2022-02-18
Payer: MEDICARE

## 2022-02-18 DIAGNOSIS — Z03.818 ENCOUNTER FOR OBSERVATION FOR SUSPECTED EXPOSURE TO OTHER BIOLOGICAL AGENTS RULED OUT: ICD-10-CM

## 2022-02-18 PROCEDURE — U0005 INFEC AGEN DETEC AMPLI PROBE: HCPCS | Mod: ORL | Performed by: NURSE PRACTITIONER

## 2022-02-20 ENCOUNTER — TELEPHONE (OUTPATIENT)
Dept: NURSING | Facility: CLINIC | Age: 77
End: 2022-02-20
Payer: MEDICARE

## 2022-02-20 LAB — SARS-COV-2 RNA RESP QL NAA+PROBE: POSITIVE

## 2022-02-20 NOTE — TELEPHONE ENCOUNTER
Coronavirus (COVID-19) Notification    Caller Name (Patient, parent, daughter/son, grandparent, etc)  Patient     Reason for call  Notify of Positive Coronavirus (COVID-19) lab results, assess symptoms,  review [facility] recommendations    Lab Result    Lab test:  2019-nCoV rRt-PCR or SARS-CoV-2 PCR    Oropharyngeal AND/OR nasopharyngeal swabs is POSITIVE for 2019-nCoV RNA/SARS-COV-2 PCR (COVID-19 virus)      Gather patient reported symptoms   Assessment   Current Symptoms at time of phone call, reported by patient: (if no symptoms, document: No symptoms] Fatigue, cough    Date of symptom(s) onset (if applicable) 2/11/22     If at time of call, Patients symptoms have worsened, the Patient should contact 911 or have someone drive them to Emergency Dept promptly:      If Patient calling 911, inform 911 personal that you have tested positive for the Coronavirus (COVID-19).  Place mask on and await 911 to arrive.    If Emergency Dept, If possible, please have another adult drive you to the Emergency Dept but you need to wear mask when in contact with other people.      Treatment Options:   Patient classified as COVID treatment eligible by Epic high risk algorithm: Yes  Is the patient symptomatic at the time of result notification? Yes. Was the onset of symptoms within the last 5 days? No. You may be eligible to receive a new treatment with a monoclonal antibody for preventing hospitalization in patients at high risk for complications from COVID-19.   This medication is still experimental and available on a limited basis; it is given through an IV and must be given at an infusion center. Please note that not all people who are eligible will receive the medication since it is in limited supply.  Is the patient interested in treatment?  Yes  Patient was given referral to [email or web address]    Review information with Patient    Your result was positive. This means you have  COVID-19 (coronavirus).    How can I protect others?    These guidelines are for isolating before returning to work, school or .    If you DO have symptoms    Stay home and away from others     For at least 5 days after your symptoms started, AND    You are fever free for 24 hours (with no medicine that reduces fever), AND    Your other symptoms are better    Wear a mask for 10 full days anytime you are around others    If you DON'T have symptoms    Stay home and away from others for at least 5 days after your positive test    Wear a mask for 10 full days anytime you are around others    There may be different guidelines for healthcare facilities.  Please check with the specific sites before arriving.    If you have been told by a doctor that you were severely ill with COVID-19 or are immunocompromised, you should isolate for at least 10 days.    You should not go back to work until you meet the guidelines above for ending your home isolation. You don't need to be retested for COVID-19 before going back to work--studies show that you won't spread the virus if it's been at least 10 days since your symptoms started (or 20 days, if you have a weak immune system).    Employers, schools, and daycares: This is an official notice for this person's medical guidelines for returning in-person.  They must meet the above guidelines before going back to work, school or  in person.    You will receive a positive COVID-19 letter via [employer] or the mail soon with additional self-care information (exception, no letters will be sent to presurgical/preprocedure patients).    Would you like me to review some of that information with you now?  Yes    How can I take care of myself?      Get lots of rest. Drink extra fluids (unless a doctor has told you not to).      Take Tylenol (acetaminophen) for fever or pain. If you have liver or kidney problems, ask your family doctor if it's okay to take Tylenol.     Take either:      650 mg (two 325 mg pills) every 4 to 6 hours, or     1,000 mg (two 500 mg pills) every 8 hours as needed.     Note: Do not take more than 3,000 mg in one day. Acetaminophen is found in many medicines (both prescribed and over-the-counter medicines). Read all labels to be sure you don't take too much.    For children, check the Tylenol bottle for the right dose (based on their age or weight).      If you have other health problems (like cancer, heart failure, an organ transplant or severe kidney disease): Call your specialty clinic if you don't feel better in the next 2 days.      Know when to call 911: Emergency warning signs include:    Trouble breathing or shortness of breath    Pain or pressure in the chest that doesn't go away    Feeling confused like you haven't felt before, or not being able to wake up    Bluish-colored lips or face      Sign up for [employer]. We know it's scary to hear that you have COVID-19. We want to track your symptoms to make sure you're okay over the next 2 weeks. Please look for an email from [employer]--this is a free, online program that we'll use to keep in touch. To sign up, follow the link in the email. Learn more at [email or web address].      If you were tested for an upcoming procedure, please contact your provider for next steps.    Aby Khan LPN

## 2022-04-06 ENCOUNTER — LAB REQUISITION (OUTPATIENT)
Dept: LAB | Facility: CLINIC | Age: 77
End: 2022-04-06
Payer: MEDICARE

## 2022-04-06 DIAGNOSIS — R73.03 PREDIABETES: ICD-10-CM

## 2022-04-06 DIAGNOSIS — E78.5 HYPERLIPIDEMIA, UNSPECIFIED: ICD-10-CM

## 2022-04-06 LAB
ALBUMIN SERPL-MCNC: 4.1 G/DL (ref 3.5–5)
ALP SERPL-CCNC: 85 U/L (ref 45–120)
ALT SERPL W P-5'-P-CCNC: 15 U/L (ref 0–45)
ANION GAP SERPL CALCULATED.3IONS-SCNC: 12 MMOL/L (ref 5–18)
AST SERPL W P-5'-P-CCNC: 17 U/L (ref 0–40)
BASOPHILS # BLD AUTO: 0 10E3/UL (ref 0–0.2)
BASOPHILS NFR BLD AUTO: 1 %
BILIRUB SERPL-MCNC: 0.6 MG/DL (ref 0–1)
BUN SERPL-MCNC: 14 MG/DL (ref 8–28)
CALCIUM SERPL-MCNC: 9.5 MG/DL (ref 8.5–10.5)
CHLORIDE BLD-SCNC: 101 MMOL/L (ref 98–107)
CHOLEST SERPL-MCNC: 124 MG/DL
CO2 SERPL-SCNC: 28 MMOL/L (ref 22–31)
CREAT SERPL-MCNC: 0.68 MG/DL (ref 0.6–1.1)
EOSINOPHIL # BLD AUTO: 0.2 10E3/UL (ref 0–0.7)
EOSINOPHIL NFR BLD AUTO: 4 %
ERYTHROCYTE [DISTWIDTH] IN BLOOD BY AUTOMATED COUNT: 12.5 % (ref 10–15)
FASTING STATUS PATIENT QL REPORTED: ABNORMAL
GFR SERPL CREATININE-BSD FRML MDRD: 89 ML/MIN/1.73M2
GLUCOSE BLD-MCNC: 119 MG/DL (ref 70–125)
HBA1C MFR BLD: 6.4 %
HCT VFR BLD AUTO: 42.3 % (ref 35–47)
HDLC SERPL-MCNC: 38 MG/DL
HGB BLD-MCNC: 14.2 G/DL (ref 11.7–15.7)
IMM GRANULOCYTES # BLD: 0 10E3/UL
IMM GRANULOCYTES NFR BLD: 0 %
LDLC SERPL CALC-MCNC: 49 MG/DL
LYMPHOCYTES # BLD AUTO: 1.5 10E3/UL (ref 0.8–5.3)
LYMPHOCYTES NFR BLD AUTO: 31 %
MCH RBC QN AUTO: 29.9 PG (ref 26.5–33)
MCHC RBC AUTO-ENTMCNC: 33.6 G/DL (ref 31.5–36.5)
MCV RBC AUTO: 89 FL (ref 78–100)
MONOCYTES # BLD AUTO: 0.5 10E3/UL (ref 0–1.3)
MONOCYTES NFR BLD AUTO: 10 %
NEUTROPHILS # BLD AUTO: 2.8 10E3/UL (ref 1.6–8.3)
NEUTROPHILS NFR BLD AUTO: 54 %
NRBC # BLD AUTO: 0 10E3/UL
NRBC BLD AUTO-RTO: 0 /100
PLATELET # BLD AUTO: 261 10E3/UL (ref 150–450)
POTASSIUM BLD-SCNC: 4.5 MMOL/L (ref 3.5–5)
PROT SERPL-MCNC: 6.6 G/DL (ref 6–8)
RBC # BLD AUTO: 4.75 10E6/UL (ref 3.8–5.2)
SODIUM SERPL-SCNC: 141 MMOL/L (ref 136–145)
TRIGL SERPL-MCNC: 187 MG/DL
TSH SERPL DL<=0.005 MIU/L-ACNC: 1.33 UIU/ML (ref 0.3–5)
WBC # BLD AUTO: 5 10E3/UL (ref 4–11)

## 2022-04-06 PROCEDURE — 83036 HEMOGLOBIN GLYCOSYLATED A1C: CPT | Mod: ORL | Performed by: STUDENT IN AN ORGANIZED HEALTH CARE EDUCATION/TRAINING PROGRAM

## 2022-04-06 PROCEDURE — 85025 COMPLETE CBC W/AUTO DIFF WBC: CPT | Mod: ORL | Performed by: STUDENT IN AN ORGANIZED HEALTH CARE EDUCATION/TRAINING PROGRAM

## 2022-04-06 PROCEDURE — 80053 COMPREHEN METABOLIC PANEL: CPT | Mod: ORL | Performed by: STUDENT IN AN ORGANIZED HEALTH CARE EDUCATION/TRAINING PROGRAM

## 2022-04-06 PROCEDURE — 84443 ASSAY THYROID STIM HORMONE: CPT | Mod: ORL | Performed by: STUDENT IN AN ORGANIZED HEALTH CARE EDUCATION/TRAINING PROGRAM

## 2022-04-06 PROCEDURE — 80061 LIPID PANEL: CPT | Mod: ORL | Performed by: STUDENT IN AN ORGANIZED HEALTH CARE EDUCATION/TRAINING PROGRAM

## 2022-05-15 ENCOUNTER — HEALTH MAINTENANCE LETTER (OUTPATIENT)
Age: 77
End: 2022-05-15

## 2022-07-20 ENCOUNTER — LAB REQUISITION (OUTPATIENT)
Dept: LAB | Facility: CLINIC | Age: 77
End: 2022-07-20
Payer: MEDICARE

## 2022-07-20 DIAGNOSIS — R68.89 OTHER GENERAL SYMPTOMS AND SIGNS: ICD-10-CM

## 2022-07-20 LAB
T4 FREE SERPL-MCNC: 0.98 NG/DL (ref 0.9–1.7)
TSH SERPL DL<=0.005 MIU/L-ACNC: 1.31 UIU/ML (ref 0.3–4.2)

## 2022-07-20 PROCEDURE — 83835 ASSAY OF METANEPHRINES: CPT | Mod: ORL | Performed by: STUDENT IN AN ORGANIZED HEALTH CARE EDUCATION/TRAINING PROGRAM

## 2022-07-20 PROCEDURE — 84481 FREE ASSAY (FT-3): CPT | Mod: ORL | Performed by: STUDENT IN AN ORGANIZED HEALTH CARE EDUCATION/TRAINING PROGRAM

## 2022-07-20 PROCEDURE — 84439 ASSAY OF FREE THYROXINE: CPT | Mod: ORL | Performed by: STUDENT IN AN ORGANIZED HEALTH CARE EDUCATION/TRAINING PROGRAM

## 2022-07-20 PROCEDURE — 84443 ASSAY THYROID STIM HORMONE: CPT | Mod: ORL | Performed by: STUDENT IN AN ORGANIZED HEALTH CARE EDUCATION/TRAINING PROGRAM

## 2022-07-21 LAB — T3FREE SERPL-MCNC: 3 PG/ML (ref 2–4.4)

## 2022-07-23 LAB
ANNOTATION COMMENT IMP: NORMAL
METANEPHS SERPL-SCNC: <0.1 NMOL/L
NORMETANEPHRINE SERPL-SCNC: 0.83 NMOL/L

## 2022-08-25 ENCOUNTER — ANCILLARY PROCEDURE (OUTPATIENT)
Dept: MAMMOGRAPHY | Facility: CLINIC | Age: 77
End: 2022-08-25
Attending: INTERNAL MEDICINE
Payer: MEDICARE

## 2022-08-25 DIAGNOSIS — Z12.31 ENCOUNTER FOR SCREENING MAMMOGRAM FOR MALIGNANT NEOPLASM OF BREAST: ICD-10-CM

## 2022-08-25 DIAGNOSIS — C50.411 MALIGNANT NEOPLASM OF UPPER-OUTER QUADRANT OF RIGHT BREAST IN FEMALE, ESTROGEN RECEPTOR POSITIVE (H): ICD-10-CM

## 2022-08-25 DIAGNOSIS — Z17.0 MALIGNANT NEOPLASM OF UPPER-OUTER QUADRANT OF RIGHT BREAST IN FEMALE, ESTROGEN RECEPTOR POSITIVE (H): ICD-10-CM

## 2022-08-25 PROCEDURE — 77067 SCR MAMMO BI INCL CAD: CPT | Mod: 52

## 2022-09-11 ENCOUNTER — HEALTH MAINTENANCE LETTER (OUTPATIENT)
Age: 77
End: 2022-09-11

## 2022-09-27 ENCOUNTER — TRANSFERRED RECORDS (OUTPATIENT)
Dept: HEALTH INFORMATION MANAGEMENT | Facility: CLINIC | Age: 77
End: 2022-09-27

## 2022-09-27 ENCOUNTER — LAB REQUISITION (OUTPATIENT)
Dept: LAB | Facility: CLINIC | Age: 77
End: 2022-09-27
Payer: MEDICARE

## 2022-09-27 DIAGNOSIS — E78.5 HYPERLIPIDEMIA, UNSPECIFIED: ICD-10-CM

## 2022-09-27 DIAGNOSIS — R73.03 PREDIABETES: ICD-10-CM

## 2022-09-27 DIAGNOSIS — I10 ESSENTIAL (PRIMARY) HYPERTENSION: ICD-10-CM

## 2022-09-27 LAB
ALBUMIN SERPL BCG-MCNC: 4.3 G/DL (ref 3.5–5.2)
ALP SERPL-CCNC: 85 U/L (ref 35–104)
ALT SERPL W P-5'-P-CCNC: 44 U/L (ref 10–35)
ANION GAP SERPL CALCULATED.3IONS-SCNC: 9 MMOL/L (ref 7–15)
AST SERPL W P-5'-P-CCNC: 58 U/L (ref 10–35)
BILIRUB SERPL-MCNC: 0.5 MG/DL
BUN SERPL-MCNC: 18.7 MG/DL (ref 8–23)
CALCIUM SERPL-MCNC: 8.8 MG/DL (ref 8.8–10.2)
CHLORIDE SERPL-SCNC: 102 MMOL/L (ref 98–107)
CHOLEST SERPL-MCNC: 108 MG/DL
CREAT SERPL-MCNC: 0.59 MG/DL (ref 0.51–0.95)
DEPRECATED HCO3 PLAS-SCNC: 27 MMOL/L (ref 22–29)
GFR SERPL CREATININE-BSD FRML MDRD: >90 ML/MIN/1.73M2
GLUCOSE SERPL-MCNC: 128 MG/DL (ref 70–99)
HDLC SERPL-MCNC: 34 MG/DL
LDLC SERPL CALC-MCNC: 30 MG/DL
NONHDLC SERPL-MCNC: 74 MG/DL
POTASSIUM SERPL-SCNC: 4.2 MMOL/L (ref 3.4–5.3)
PROT SERPL-MCNC: 6 G/DL (ref 6.4–8.3)
SODIUM SERPL-SCNC: 138 MMOL/L (ref 136–145)
TRIGL SERPL-MCNC: 218 MG/DL

## 2022-09-27 PROCEDURE — 80061 LIPID PANEL: CPT | Mod: ORL | Performed by: STUDENT IN AN ORGANIZED HEALTH CARE EDUCATION/TRAINING PROGRAM

## 2022-09-27 PROCEDURE — 80053 COMPREHEN METABOLIC PANEL: CPT | Mod: ORL | Performed by: STUDENT IN AN ORGANIZED HEALTH CARE EDUCATION/TRAINING PROGRAM

## 2022-10-04 ENCOUNTER — TRANSFERRED RECORDS (OUTPATIENT)
Dept: HEALTH INFORMATION MANAGEMENT | Facility: CLINIC | Age: 77
End: 2022-10-04

## 2022-10-04 LAB — EJECTION FRACTION: NORMAL %

## 2022-10-12 ENCOUNTER — MEDICAL CORRESPONDENCE (OUTPATIENT)
Dept: HEALTH INFORMATION MANAGEMENT | Facility: CLINIC | Age: 77
End: 2022-10-12

## 2022-10-18 ENCOUNTER — OFFICE VISIT (OUTPATIENT)
Dept: CARDIOLOGY | Facility: CLINIC | Age: 77
End: 2022-10-18
Payer: MEDICARE

## 2022-10-18 VITALS
WEIGHT: 155 LBS | BODY MASS INDEX: 26.46 KG/M2 | SYSTOLIC BLOOD PRESSURE: 132 MMHG | DIASTOLIC BLOOD PRESSURE: 56 MMHG | HEART RATE: 80 BPM | RESPIRATION RATE: 16 BRPM | HEIGHT: 64 IN

## 2022-10-18 DIAGNOSIS — Z51.81 ENCOUNTER FOR THERAPEUTIC DRUG LEVEL MONITORING: Primary | ICD-10-CM

## 2022-10-18 DIAGNOSIS — T46.0X1A POISONING BY DIGOXIN, ACCIDENTAL OR UNINTENTIONAL, INITIAL ENCOUNTER: ICD-10-CM

## 2022-10-18 DIAGNOSIS — I47.10 SVT (SUPRAVENTRICULAR TACHYCARDIA) (H): ICD-10-CM

## 2022-10-18 DIAGNOSIS — R06.09 DOE (DYSPNEA ON EXERTION): ICD-10-CM

## 2022-10-18 DIAGNOSIS — R94.31 ABNORMAL ELECTROCARDIOGRAM: ICD-10-CM

## 2022-10-18 DIAGNOSIS — Q24.8 LEFT VENTRICULAR OUTFLOW TRACT OBSTRUCTION: ICD-10-CM

## 2022-10-18 LAB — DIGOXIN SERPL-MCNC: 0.7 NG/ML (ref 0.6–2)

## 2022-10-18 PROCEDURE — 80162 ASSAY OF DIGOXIN TOTAL: CPT | Performed by: INTERNAL MEDICINE

## 2022-10-18 PROCEDURE — 36415 COLL VENOUS BLD VENIPUNCTURE: CPT | Performed by: INTERNAL MEDICINE

## 2022-10-18 PROCEDURE — 99215 OFFICE O/P EST HI 40 MIN: CPT | Performed by: INTERNAL MEDICINE

## 2022-10-18 RX ORDER — LIDOCAINE 5% 5 G/100G
CREAM TOPICAL
COMMUNITY
Start: 2022-10-07 | End: 2023-01-27

## 2022-10-18 RX ORDER — HYDROCHLOROTHIAZIDE 25 MG/1
25 TABLET ORAL EVERY EVENING
COMMUNITY
Start: 2022-08-10 | End: 2022-11-29 | Stop reason: ALTCHOICE

## 2022-10-18 RX ORDER — NITROGLYCERIN 20 MG/G
OINTMENT TOPICAL
COMMUNITY
Start: 2022-10-02 | End: 2022-11-29

## 2022-10-18 NOTE — LETTER
10/18/2022    Barber Valladares MD  [facility] [address]  [place] [address]    RE: Neelima TRAVIS Efrain       Dear Colleague,     I had the pleasure of seeing Neelima Zuniga in the [facility].    [facility]   [address], [place] [address]   [email or web address]   OFFICE: [phone]     CARDIOLOGY CLINIC NOTE     Thank you, Barber Mckeon, for asking the [facility] team to see Ms. Neelima Zuniga to evaluate Shortness of Breath        Assessment/Recommendations   Assessment:    1. Dyspnea on exertion - with echo findings consistent with a dynamic LVOT obstruction and hyperdynamic LV function. Symptoms seem to correlate with initiation of hydrochlorothiazide which could be causing some low circulating blood volume and the hyperdynamic LVEF. She did have a normal stress test within the past year.   2. Abnormal ECG with some ST depression in anterior leads and above echo findings. She is on digoxin and has not had a digoxin level checked in several years (at least in our system).  3. Hypertension - reasonably controlled.    Plan:  1. Check digoxin level  2. Recommend stopping hydrochlorothiazide and replace with alternative antihypertensive (ie lisinopril). She has an appointment with Dr. Valladares tomorrow and she prefers to have him write a new prescription.   3. Will arrange for follow-up with Dr. Zuniga in about 6-8 weeks.    Primary Cardiologist: Dr. Jose Zuniga MD         History of Present Illness   Ms. Neelima Zuniga is a 77 year old female with a significant past history of SVT and hypertension who presents for evaluation of dyspnea and abnormal echo findings.    Ms. Zuniga has been on supraventricular tachycardia that is well controlled with digoxin.  She was previously intolerant to metoprolol as well as diltiazem.  She had a exercise stress echocardiogram performed in  January which was negative for ischemia.  For the past 4 to 6 weeks she is noted significant increase in shortness of breath primarily with exertion.  She has been evaluated with an EKG at [facility] that demonstrated normal sinus rhythm with some ST changes particularly in the anterior leads could be consistent with ischemia or digoxin toxicity.  She also had a transthoracic echocardiogram that was notable for an LVEF of greater than 80% with a mild dynamic LVOT obstruction.  These findings are new compared to her stress echo back in January.  She noted that after starting her hydrochlorothiazide, also for 6 weeks ago, she has had a significant increase in urinary output and timing of starting hydrochlorothiazide seems to correlate with the onset of her symptoms.      Other than noted above, Ms. Zuniga denies any chest pain/pressure/tightness, shortness of breath at rest or with exertion, light headedness/dizziness, pre-syncope, syncope, lower extremity swelling, palpitations, paroxysmal nocturnal dyspnea (PND), or orthopnea.     Cardiac Problems and Cardiac Diagnostics     Most Recent Cardiac testing:  ECG dated 9/27/22 (personaly reviewed and interpreted): sinus rhythm with inferior infarct pattern, ST depression anterior leads.    Exercise stress echo 1/25/22  Interpretation Summary  1. Normal stress echocardiogram without evidence of stress induced ischemia.  2. Normal resting LV systolic performance with an ejection fraction of 55-60%.  There is normal improvement in left ventricular systolic performance with a peak ejection fraction of 70-75%.  3. No ECG evidence of ischemia.  4. No anginal chest pain reported with exercise.  5. Average functional capacity for age.  6. Two PVCs and a solitary couplet were noted. No other rhythm disturbances detected.         Medications  Allergies   Current Outpatient Medications   Medication Sig Dispense Refill     ANECREAM5 5 % CREA lidocaine        "calcium-vitamin D (CALTRATE) 600-400 MG-UNIT per tablet Take 1 tablet by mouth 2 times daily       digoxin (LANOXIN) 250 MCG tablet Take 1 tablet (0.25 mg) by mouth daily 90 tablet 3     ESZOPICLONE PO Take 2 mg by mouth nightly as needed for sleep       GLUCOSAMINE SULFATE PO Take 1,500 mg by mouth       hydrochlorothiazide (HYDRODIURIL) 25 MG tablet Take 25 mg by mouth every evening       Lactobacillus Rhamnosus, GG, ( PROBIOTIC DIGESTIVE CARE) CAPS Take 1 capsule by mouth daily       MAGNESIUM OXIDE PO        NITRO-BID 2 % OINT ointment APPLY A THIN LAYER TO ANUS TWICE DAILY FOR HEMORRHOIDS AND FISSURE FOR 30 DAYS       omeprazole (PRILOSEC) 20 MG DR capsule TAKE 1 CAPSULE BY MOUTH EVERY DAY 30 MINUTES TO 1 HOUR BEFORE A MEAL       OMEPRAZOLE PO Take 40 mg by mouth every morning       polyethylene glycol (MIRALAX/GLYCOLAX) packet Take 1 packet by mouth daily       ROSUVASTATIN CALCIUM PO Take 20 mg by mouth       venlafaxine (EFFEXOR-XR) 37.5 MG 24 hr capsule Take 1 capsule (37.5 mg) by mouth daily 90 capsule 3      Allergies   Allergen Reactions     Erythromycin      Metoprolol Nausea     Sulfa Drugs Rash        Physical Examination Review of Systems   Vitals: /56 (BP Location: Left arm, Patient Position: Sitting, Cuff Size: Adult Regular)   Pulse 80   Resp 16   Ht 1.626 m (5' 4\")   Wt 70.3 kg (155 lb)   BMI 26.61 kg/m    BMI= Body mass index is 26.61 kg/m .  Wt Readings from Last 3 Encounters:   10/18/22 70.3 kg (155 lb)   01/14/22 69.7 kg (153 lb 9.6 oz)   01/13/22 70.3 kg (155 lb)       General Appearance:   Pleasant female, appears stated age. no acute distress, normal body habitus   ENT/Mouth: membranes moist, no apparent gingival bleeding.      EYES:  no scleral icterus, normal conjunctivae   Neck: no carotid bruits. supple   Respiratory:   lungs are clear to auscultation, no rales or wheezing, equal chest wall expansion    Cardiovascular:   Regular rhythm, borderline fast rate. Normal first " and second heart sounds with a 3/6 systolic murmur. Jugular venous pressure normal, no edema bilaterally    Extremities: no cyanosis or clubbing   Skin: no xanthelasma, warm.    Heme/lymph/ Immunology No apparent bleeding noted.   Neurologic: Alert and oriented. no tremors   Psychiatric: Pleasant, calm, appropriate affect.         Please refer above for cardiac ROS details.       Past History   Past Medical History:   Past Medical History:   Diagnosis Date     Amblyopia     left eye     Anxiety      Breast cancer (H) 2007     Cancer (H) 2009    right breast     Hyperlipidemia      Osteopenia      Palpitations      Reflux esophagitis      Sleep apnea     CPAP     Uterine fibroid         Past Surgical History:   Past Surgical History:   Procedure Laterality Date     BIOPSY BREAST Right 2007     BREAST SURGERY Right     reconstruction     BREAST SURGERY Left     augmentation     CATARACT IOL, RT/LT        SECTION       HYSTERECTOMY  2015     MAMMOPLASTY AUGMENTATION Bilateral      MASTECTOMY Right 2007     OOPHORECTOMY Bilateral 2015        Family History:   Family History   Problem Relation Age of Onset     Macular Degeneration Father      Glaucoma Father      Glaucoma Paternal Grandmother      Diabetes Mother      Hypertension Mother      Hypertension Father      Hyperlipidemia Father         Social History:   Social History     Socioeconomic History     Marital status:      Spouse name: Not on file     Number of children: Not on file     Years of education: Not on file     Highest education level: Not on file   Occupational History     Not on file   Tobacco Use     Smoking status: Never     Smokeless tobacco: Never   Substance and Sexual Activity     Alcohol use: Yes     Drug use: No     Sexual activity: Yes     Partners: Male   Other Topics Concern     Parent/sibling w/ CABG, MI or angioplasty before 65F 55M? Not Asked   Social History Narrative     Not on file     Social Determinants of Health      Financial Resource Strain: Not on file   Food Insecurity: Not on file   Transportation Needs: Not on file   Physical Activity: Not on file   Stress: Not on file   Social Connections: Not on file   Intimate Partner Violence: Not At Risk     Fear of Current or Ex-Partner: No     Emotionally Abused: No     Physically Abused: No     Sexually Abused: No   Housing Stability: Not on file            Lab Results    Chemistry/lipid CBC Cardiac Enzymes/BNP/TSH/INR   Lab Results   Component Value Date    CHOL 108 09/27/2022    HDL 34 (L) 09/27/2022    TRIG 218 (H) 09/27/2022    BUN 18.7 09/27/2022     09/27/2022    CO2 27 09/27/2022    Lab Results   Component Value Date    WBC 5.0 04/06/2022    HGB 14.2 04/06/2022    HCT 42.3 04/06/2022    MCV 89 04/06/2022     04/06/2022    Lab Results   Component Value Date    TSH 1.31 07/20/2022              CC: Primary Care Providers:  Barber Valladares MD, MD (General)   [facility] [address]   [place] [address]    CC: Dr. Jose Zuniga MD      Thank you for allowing me to participate in the care of your patient.      Sincerely,     David Marshall MD     [facility]  cc: No referring provider defined for this encounter.

## 2022-10-18 NOTE — PROGRESS NOTES
[facility] HE[facility]   [address], [place] [address]   [email or web address]   OFFICE: [phone]     CARDIOLOGY CLINIC NOTE     Thank you, Barber Mckeon, for asking the [facility] team to see Ms. Neelima Zuniga to evaluate Shortness of Breath        Assessment/Recommendations   Assessment:    1. Dyspnea on exertion - with echo findings consistent with a dynamic LVOT obstruction and hyperdynamic LV function. Symptoms seem to correlate with initiation of hydrochlorothiazide which could be causing some low circulating blood volume and the hyperdynamic LVEF. She did have a normal stress test within the past year.   2. Abnormal ECG with some ST depression in anterior leads and above echo findings. She is on digoxin and has not had a digoxin level checked in several years (at least in our system).  3. Hypertension - reasonably controlled.    Plan:  1. Check digoxin level  2. Recommend stopping hydrochlorothiazide and replace with alternative antihypertensive (ie lisinopril). She has an appointment with Dr. Valladares tomorrow and she prefers to have him write a new prescription.   3. Will arrange for follow-up with Dr. Zuniga in about 6-8 weeks.    Primary Cardiologist: Dr. Jose Zuniga MD         History of Present Illness   Ms. Neelima Zuniga is a 77 year old female with a significant past history of SVT and hypertension who presents for evaluation of dyspnea and abnormal echo findings.    Ms. Zuniga has been on supraventricular tachycardia that is well controlled with digoxin.  She was previously intolerant to metoprolol as well as diltiazem.  She had a exercise stress echocardiogram performed in January which was negative for ischemia.  For the past 4 to 6 weeks she is noted significant increase in shortness of breath primarily with exertion.  She has been evaluated with an EKG at [facility] that demonstrated normal sinus rhythm  with some ST changes particularly in the anterior leads could be consistent with ischemia or digoxin toxicity.  She also had a transthoracic echocardiogram that was notable for an LVEF of greater than 80% with a mild dynamic LVOT obstruction.  These findings are new compared to her stress echo back in January.  She noted that after starting her hydrochlorothiazide, also for 6 weeks ago, she has had a significant increase in urinary output and timing of starting hydrochlorothiazide seems to correlate with the onset of her symptoms.      Other than noted above, Ms. Zuniga denies any chest pain/pressure/tightness, shortness of breath at rest or with exertion, light headedness/dizziness, pre-syncope, syncope, lower extremity swelling, palpitations, paroxysmal nocturnal dyspnea (PND), or orthopnea.     Cardiac Problems and Cardiac Diagnostics     Most Recent Cardiac testing:  ECG dated 9/27/22 (personaly reviewed and interpreted): sinus rhythm with inferior infarct pattern, ST depression anterior leads.    Exercise stress echo 1/25/22  Interpretation Summary  1. Normal stress echocardiogram without evidence of stress induced ischemia.  2. Normal resting LV systolic performance with an ejection fraction of 55-60%.  There is normal improvement in left ventricular systolic performance with a peak ejection fraction of 70-75%.  3. No ECG evidence of ischemia.  4. No anginal chest pain reported with exercise.  5. Average functional capacity for age.  6. Two PVCs and a solitary couplet were noted. No other rhythm disturbances detected.         Medications  Allergies   Current Outpatient Medications   Medication Sig Dispense Refill     ANECREAM5 5 % CREA lidocaine       calcium-vitamin D (CALTRATE) 600-400 MG-UNIT per tablet Take 1 tablet by mouth 2 times daily       digoxin (LANOXIN) 250 MCG tablet Take 1 tablet (0.25 mg) by mouth daily 90 tablet 3     ESZOPICLONE PO Take 2 mg by mouth nightly as needed for sleep        "GLUCOSAMINE SULFATE PO Take 1,500 mg by mouth       hydrochlorothiazide (HYDRODIURIL) 25 MG tablet Take 25 mg by mouth every evening       Lactobacillus Rhamnosus, GG, ( PROBIOTIC DIGESTIVE CARE) CAPS Take 1 capsule by mouth daily       MAGNESIUM OXIDE PO        NITRO-BID 2 % OINT ointment APPLY A THIN LAYER TO ANUS TWICE DAILY FOR HEMORRHOIDS AND FISSURE FOR 30 DAYS       omeprazole (PRILOSEC) 20 MG DR capsule TAKE 1 CAPSULE BY MOUTH EVERY DAY 30 MINUTES TO 1 HOUR BEFORE A MEAL       OMEPRAZOLE PO Take 40 mg by mouth every morning       polyethylene glycol (MIRALAX/GLYCOLAX) packet Take 1 packet by mouth daily       ROSUVASTATIN CALCIUM PO Take 20 mg by mouth       venlafaxine (EFFEXOR-XR) 37.5 MG 24 hr capsule Take 1 capsule (37.5 mg) by mouth daily 90 capsule 3      Allergies   Allergen Reactions     Erythromycin      Metoprolol Nausea     Sulfa Drugs Rash        Physical Examination Review of Systems   Vitals: /56 (BP Location: Left arm, Patient Position: Sitting, Cuff Size: Adult Regular)   Pulse 80   Resp 16   Ht 1.626 m (5' 4\")   Wt 70.3 kg (155 lb)   BMI 26.61 kg/m    BMI= Body mass index is 26.61 kg/m .  Wt Readings from Last 3 Encounters:   10/18/22 70.3 kg (155 lb)   01/14/22 69.7 kg (153 lb 9.6 oz)   01/13/22 70.3 kg (155 lb)       General Appearance:   Pleasant female, appears stated age. no acute distress, normal body habitus   ENT/Mouth: membranes moist, no apparent gingival bleeding.      EYES:  no scleral icterus, normal conjunctivae   Neck: no carotid bruits. supple   Respiratory:   lungs are clear to auscultation, no rales or wheezing, equal chest wall expansion    Cardiovascular:   Regular rhythm, borderline fast rate. Normal first and second heart sounds with a 3/6 systolic murmur. Jugular venous pressure normal, no edema bilaterally    Extremities: no cyanosis or clubbing   Skin: no xanthelasma, warm.    Heme/lymph/ Immunology No apparent bleeding noted.   Neurologic: Alert and " oriented. no tremors   Psychiatric: Pleasant, calm, appropriate affect.         Please refer above for cardiac ROS details.       Past History   Past Medical History:   Past Medical History:   Diagnosis Date     Amblyopia     left eye     Anxiety      Breast cancer (H) 2007     Cancer (H) 2009    right breast     Hyperlipidemia      Osteopenia      Palpitations      Reflux esophagitis      Sleep apnea     CPAP     Uterine fibroid         Past Surgical History:   Past Surgical History:   Procedure Laterality Date     BIOPSY BREAST Right 2007     BREAST SURGERY Right     reconstruction     BREAST SURGERY Left     augmentation     CATARACT IOL, RT/LT        SECTION       HYSTERECTOMY  2015     MAMMOPLASTY AUGMENTATION Bilateral      MASTECTOMY Right 2007     OOPHORECTOMY Bilateral 2015        Family History:   Family History   Problem Relation Age of Onset     Macular Degeneration Father      Glaucoma Father      Glaucoma Paternal Grandmother      Diabetes Mother      Hypertension Mother      Hypertension Father      Hyperlipidemia Father         Social History:   Social History     Socioeconomic History     Marital status:      Spouse name: Not on file     Number of children: Not on file     Years of education: Not on file     Highest education level: Not on file   Occupational History     Not on file   Tobacco Use     Smoking status: Never     Smokeless tobacco: Never   Substance and Sexual Activity     Alcohol use: Yes     Drug use: No     Sexual activity: Yes     Partners: Male   Other Topics Concern     Parent/sibling w/ CABG, MI or angioplasty before 65F 55M? Not Asked   Social History Narrative     Not on file     Social Determinants of Health     Financial Resource Strain: Not on file   Food Insecurity: Not on file   Transportation Needs: Not on file   Physical Activity: Not on file   Stress: Not on file   Social Connections: Not on file   Intimate Partner Violence: Not At Risk     Fear of  Current or Ex-Partner: No     Emotionally Abused: No     Physically Abused: No     Sexually Abused: No   Housing Stability: Not on file            Lab Results    Chemistry/lipid CBC Cardiac Enzymes/BNP/TSH/INR   Lab Results   Component Value Date    CHOL 108 09/27/2022    HDL 34 (L) 09/27/2022    TRIG 218 (H) 09/27/2022    BUN 18.7 09/27/2022     09/27/2022    CO2 27 09/27/2022    Lab Results   Component Value Date    WBC 5.0 04/06/2022    HGB 14.2 04/06/2022    HCT 42.3 04/06/2022    MCV 89 04/06/2022     04/06/2022    Lab Results   Component Value Date    TSH 1.31 07/20/2022              CC: Primary Care Providers:  Barber Valladares MD, MD (General)   [facility] [address]  [place] [address]    CC: Dr. Jose Zuniga MD

## 2022-10-18 NOTE — PATIENT INSTRUCTIONS
It was a pleasure to meet with you today.      Below is a summary of your visit.   Your symptoms and echo findings may be a result of the hydrochlorothiazide causing you to lose too much water. I would recommend talking with Dr. Valladares about stopping the hydrochlorothiazide and replacing with lisinopril or other medication that is not a diuretic.  If you do not have any improvement in symptoms with this change, then we could consider CT coronary angiogram or lung function testing.  Follow up with Dr. Zuniga          Please do not hesitate to call the [facility] with any questions or concerns at [phone].     Sincerely,

## 2022-10-19 ENCOUNTER — TRANSFERRED RECORDS (OUTPATIENT)
Dept: HEALTH INFORMATION MANAGEMENT | Facility: CLINIC | Age: 77
End: 2022-10-19

## 2022-10-27 ENCOUNTER — ANESTHESIA EVENT (OUTPATIENT)
Dept: SURGERY | Facility: HOSPITAL | Age: 77
End: 2022-10-27
Payer: MEDICARE

## 2022-10-28 ENCOUNTER — ANESTHESIA (OUTPATIENT)
Dept: SURGERY | Facility: HOSPITAL | Age: 77
End: 2022-10-28
Payer: MEDICARE

## 2022-10-28 ENCOUNTER — HOSPITAL ENCOUNTER (OUTPATIENT)
Facility: HOSPITAL | Age: 77
Discharge: HOME | End: 2022-10-28
Attending: COLON & RECTAL SURGERY | Admitting: COLON & RECTAL SURGERY
Payer: MEDICARE

## 2022-10-28 VITALS
RESPIRATION RATE: 16 BRPM | HEART RATE: 78 BPM | WEIGHT: 153.4 LBS | TEMPERATURE: 98.1 F | SYSTOLIC BLOOD PRESSURE: 142 MMHG | BODY MASS INDEX: 26.19 KG/M2 | OXYGEN SATURATION: 95 % | HEIGHT: 64 IN | DIASTOLIC BLOOD PRESSURE: 79 MMHG

## 2022-10-28 DIAGNOSIS — K60.2 ANAL FISSURE: Primary | ICD-10-CM

## 2022-10-28 PROCEDURE — 272N000001 HC OR GENERAL SUPPLY STERILE: Performed by: COLON & RECTAL SURGERY

## 2022-10-28 PROCEDURE — 999N000141 HC STATISTIC PRE-PROCEDURE NURSING ASSESSMENT: Performed by: COLON & RECTAL SURGERY

## 2022-10-28 PROCEDURE — 88304 TISSUE EXAM BY PATHOLOGIST: CPT | Mod: TC | Performed by: COLON & RECTAL SURGERY

## 2022-10-28 PROCEDURE — 258N000003 HC RX IP 258 OP 636: Performed by: ANESTHESIOLOGY

## 2022-10-28 PROCEDURE — 360N000076 HC SURGERY LEVEL 3, PER MIN: Performed by: COLON & RECTAL SURGERY

## 2022-10-28 PROCEDURE — 250N000009 HC RX 250: Performed by: NURSE ANESTHETIST, CERTIFIED REGISTERED

## 2022-10-28 PROCEDURE — 250N000011 HC RX IP 250 OP 636: Performed by: COLON & RECTAL SURGERY

## 2022-10-28 PROCEDURE — 250N000011 HC RX IP 250 OP 636: Performed by: NURSE ANESTHETIST, CERTIFIED REGISTERED

## 2022-10-28 PROCEDURE — C9290 INJ, BUPIVACAINE LIPOSOME: HCPCS | Performed by: COLON & RECTAL SURGERY

## 2022-10-28 PROCEDURE — 710N000012 HC RECOVERY PHASE 2, PER MINUTE: Performed by: COLON & RECTAL SURGERY

## 2022-10-28 PROCEDURE — 258N000003 HC RX IP 258 OP 636: Performed by: NURSE ANESTHETIST, CERTIFIED REGISTERED

## 2022-10-28 PROCEDURE — 250N000020 HC RX IP 250 OP 636 J0585: Performed by: COLON & RECTAL SURGERY

## 2022-10-28 PROCEDURE — 370N000017 HC ANESTHESIA TECHNICAL FEE, PER MIN: Performed by: COLON & RECTAL SURGERY

## 2022-10-28 RX ORDER — HYDROMORPHONE HYDROCHLORIDE 1 MG/ML
0.2 INJECTION, SOLUTION INTRAMUSCULAR; INTRAVENOUS; SUBCUTANEOUS EVERY 5 MIN PRN
Status: DISCONTINUED | OUTPATIENT
Start: 2022-10-28 | End: 2022-10-28 | Stop reason: HOSPADM

## 2022-10-28 RX ORDER — OXYCODONE HYDROCHLORIDE 5 MG/1
5-10 TABLET ORAL EVERY 6 HOURS PRN
Qty: 8 TABLET | Refills: 0 | Status: SHIPPED | OUTPATIENT
Start: 2022-10-28 | End: 2022-11-29

## 2022-10-28 RX ORDER — SODIUM CHLORIDE, SODIUM LACTATE, POTASSIUM CHLORIDE, CALCIUM CHLORIDE 600; 310; 30; 20 MG/100ML; MG/100ML; MG/100ML; MG/100ML
INJECTION, SOLUTION INTRAVENOUS CONTINUOUS
Status: DISCONTINUED | OUTPATIENT
Start: 2022-10-28 | End: 2022-10-28 | Stop reason: HOSPADM

## 2022-10-28 RX ORDER — FENTANYL CITRATE 50 UG/ML
25 INJECTION, SOLUTION INTRAMUSCULAR; INTRAVENOUS EVERY 5 MIN PRN
Status: DISCONTINUED | OUTPATIENT
Start: 2022-10-28 | End: 2022-10-28 | Stop reason: HOSPADM

## 2022-10-28 RX ORDER — NALOXONE HYDROCHLORIDE 1 MG/ML
0.2 INJECTION INTRAMUSCULAR; INTRAVENOUS; SUBCUTANEOUS
Status: DISCONTINUED | OUTPATIENT
Start: 2022-10-28 | End: 2022-10-28 | Stop reason: HOSPADM

## 2022-10-28 RX ORDER — LIDOCAINE HYDROCHLORIDE 20 MG/ML
JELLY TOPICAL EVERY 4 HOURS PRN
Qty: 30 ML | Refills: 0 | Status: SHIPPED | OUTPATIENT
Start: 2022-10-28 | End: 2023-01-27

## 2022-10-28 RX ORDER — SODIUM CHLORIDE, SODIUM LACTATE, POTASSIUM CHLORIDE, CALCIUM CHLORIDE 600; 310; 30; 20 MG/100ML; MG/100ML; MG/100ML; MG/100ML
INJECTION, SOLUTION INTRAVENOUS CONTINUOUS PRN
Status: DISCONTINUED | OUTPATIENT
Start: 2022-10-28 | End: 2022-10-28

## 2022-10-28 RX ORDER — ONDANSETRON 4 MG/1
4 TABLET, ORALLY DISINTEGRATING ORAL EVERY 30 MIN PRN
Status: DISCONTINUED | OUTPATIENT
Start: 2022-10-28 | End: 2022-10-28 | Stop reason: HOSPADM

## 2022-10-28 RX ORDER — FENTANYL CITRATE 50 UG/ML
25 INJECTION, SOLUTION INTRAMUSCULAR; INTRAVENOUS
Status: DISCONTINUED | OUTPATIENT
Start: 2022-10-28 | End: 2022-10-28 | Stop reason: HOSPADM

## 2022-10-28 RX ORDER — LABETALOL HYDROCHLORIDE 5 MG/ML
INJECTION, SOLUTION INTRAVENOUS PRN
Status: DISCONTINUED | OUTPATIENT
Start: 2022-10-28 | End: 2022-10-28

## 2022-10-28 RX ORDER — MEPERIDINE HYDROCHLORIDE 25 MG/ML
12.5 INJECTION INTRAMUSCULAR; INTRAVENOUS; SUBCUTANEOUS
Status: DISCONTINUED | OUTPATIENT
Start: 2022-10-28 | End: 2022-10-28 | Stop reason: HOSPADM

## 2022-10-28 RX ORDER — BUPIVACAINE HYDROCHLORIDE 2.5 MG/ML
INJECTION, SOLUTION INFILTRATION; PERINEURAL PRN
Status: DISCONTINUED | OUTPATIENT
Start: 2022-10-28 | End: 2022-10-28 | Stop reason: HOSPADM

## 2022-10-28 RX ORDER — NALOXONE HYDROCHLORIDE 1 MG/ML
0.4 INJECTION INTRAMUSCULAR; INTRAVENOUS; SUBCUTANEOUS
Status: DISCONTINUED | OUTPATIENT
Start: 2022-10-28 | End: 2022-10-28 | Stop reason: HOSPADM

## 2022-10-28 RX ORDER — LIDOCAINE HYDROCHLORIDE 10 MG/ML
INJECTION, SOLUTION INFILTRATION; PERINEURAL PRN
Status: DISCONTINUED | OUTPATIENT
Start: 2022-10-28 | End: 2022-10-28

## 2022-10-28 RX ORDER — ONDANSETRON 2 MG/ML
4 INJECTION INTRAMUSCULAR; INTRAVENOUS EVERY 30 MIN PRN
Status: DISCONTINUED | OUTPATIENT
Start: 2022-10-28 | End: 2022-10-28 | Stop reason: HOSPADM

## 2022-10-28 RX ORDER — LIDOCAINE 40 MG/G
CREAM TOPICAL
Status: DISCONTINUED | OUTPATIENT
Start: 2022-10-28 | End: 2022-10-28 | Stop reason: HOSPADM

## 2022-10-28 RX ORDER — PROPOFOL 10 MG/ML
INJECTION, EMULSION INTRAVENOUS CONTINUOUS PRN
Status: DISCONTINUED | OUTPATIENT
Start: 2022-10-28 | End: 2022-10-28

## 2022-10-28 RX ORDER — OXYCODONE HYDROCHLORIDE 5 MG/1
5 TABLET ORAL EVERY 4 HOURS PRN
Status: DISCONTINUED | OUTPATIENT
Start: 2022-10-28 | End: 2022-10-28 | Stop reason: HOSPADM

## 2022-10-28 RX ORDER — ONDANSETRON 4 MG/1
4 TABLET, ORALLY DISINTEGRATING ORAL
Status: DISCONTINUED | OUTPATIENT
Start: 2022-10-28 | End: 2022-10-28 | Stop reason: HOSPADM

## 2022-10-28 RX ADMIN — SODIUM CHLORIDE, POTASSIUM CHLORIDE, SODIUM LACTATE AND CALCIUM CHLORIDE: 600; 310; 30; 20 INJECTION, SOLUTION INTRAVENOUS at 13:37

## 2022-10-28 RX ADMIN — MIDAZOLAM 2 MG: 1 INJECTION INTRAMUSCULAR; INTRAVENOUS at 13:41

## 2022-10-28 RX ADMIN — LABETALOL HYDROCHLORIDE 10 MG: 5 INJECTION, SOLUTION INTRAVENOUS at 14:23

## 2022-10-28 RX ADMIN — LABETALOL HYDROCHLORIDE 10 MG: 5 INJECTION, SOLUTION INTRAVENOUS at 14:26

## 2022-10-28 RX ADMIN — LIDOCAINE HYDROCHLORIDE 30 MG: 10 INJECTION, SOLUTION INFILTRATION; PERINEURAL at 13:48

## 2022-10-28 RX ADMIN — SODIUM CHLORIDE, POTASSIUM CHLORIDE, SODIUM LACTATE AND CALCIUM CHLORIDE: 600; 310; 30; 20 INJECTION, SOLUTION INTRAVENOUS at 12:36

## 2022-10-28 RX ADMIN — PROPOFOL 150 MCG/KG/MIN: 10 INJECTION, EMULSION INTRAVENOUS at 13:49

## 2022-10-28 ASSESSMENT — ACTIVITIES OF DAILY LIVING (ADL)
ADLS_ACUITY_SCORE: 35
ADLS_ACUITY_SCORE: 35

## 2022-10-28 NOTE — H&P
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 appropriate medical therapy    Past Medical History:   Diagnosis Date     Amblyopia     left eye     Anxiety      Breast cancer (H) 2007     Cancer (H) 2009    right breast     Hyperlipidemia      Osteopenia      Palpitations      Reflux esophagitis      Sleep apnea     CPAP     Uterine fibroid      Past Surgical History:   Procedure Laterality Date     BIOPSY BREAST Right 2007     BREAST SURGERY Right     reconstruction     BREAST SURGERY Left     augmentation     CATARACT IOL, RT/LT        SECTION       HYSTERECTOMY  2015     MAMMOPLASTY AUGMENTATION Bilateral      MASTECTOMY Right 2007     OOPHORECTOMY Bilateral 2015       Physical exam  All vitals stable  CV RRR  CTAB  abd soft, non distended, non tender    Plan:  To OR for eua with likely botox injection  Ok for MAC    Priscilla Orr MD

## 2022-10-28 NOTE — ANESTHESIA CARE TRANSFER NOTE
Patient: Neelima Zuniga    Procedure: Procedure(s):  RECTAL EXAM UNDER ANESTHESIA  BOTOX INJECTION, EXCISION PAPILLA  POSSIBLE SPHINCTEROTOMY       Diagnosis: Anal fissure [K60.2]  Diagnosis Additional Information: No value filed.    Anesthesia Type:   MAC     Note:    Oropharynx: oropharynx clear of all foreign objects and spontaneously breathing  Level of Consciousness: drowsy  Oxygen Supplementation: face mask  Level of Supplemental Oxygen (L/min / FiO2): 6  Independent Airway: airway patency satisfactory and stable  Dentition: dentition unchanged  Vital Signs Stable: post-procedure vital signs reviewed and stable  Report to RN Given: handoff report given  Patient transferred to: Phase II    Handoff Report: Identifed the Patient, Identified the Reponsible Provider, Reviewed the pertinent medical history, Discussed the surgical course, Reviewed Intra-OP anesthesia mangement and issues during anesthesia, Set expectations for post-procedure period and Allowed opportunity for questions and acknowledgement of understanding      Vitals:  Vitals Value Taken Time   BP     Temp     Pulse     Resp     SpO2         Electronically Signed By: VALERIE Villagomez CRNA  October 28, 2022  2:32 PM

## 2022-10-28 NOTE — PROCEDURES
[facility]ERY OPERATIVE NOTE    DATE OF SERVICE: 10/28/22     PROCEDURE NAME:   1. Rectal exam under anesthesia  2. Excision of large hypertophied anal papilla  3. Botox injection     PRE-PROCEDURE DIAGNOSIS: anal fissure     POST-PROCEDURE DIAGNOSIS: same     SURGEON: Priscilla Orr MD     ASSISTANT: none     FINDINGS: very hypertonic sphincter, large left lateral papilla      ESTIMATED BLOOD LOSS: 5mL     ANESTHESIA: mac with local     SPECIMEN: left lateral papilla    INDICATIONS FOR PROCEDURE:  Neelima Zuniga is a 77 year old female presenting with anal fissure and rectal pain that has persisted despite appropriate dietary modification and medical therapy. We discussed performing an EUA to rule out other causes and botox injection for the fissure if confirmed.  The risks and benefits of surgery were discussed with the patient including bleeding, infection, transient incontinence, fissure persistence/recurrence. The patient verbalized understanding and consented to proceed.        DESCRIPTION OF PROCEDURE:  The patient was taken to the operating room and placed under MAC anesthesia. They were then placed in the prone joanne knife position on the operating room table. The buttocks were taped apart. The surgical area was then prepped and draped in the usual sterile fashion. A timeout was performed per protocol. We then started with a perianal block with 0.25% marcaine and exparel mixture.  External exam was unremarkable, there was no obvious fissure. A digital rectal exam was performed which revealed a soft large likely papilla in the left lateral and left anterior quadrants.  Despite the local injection she had an extremely hypertonic sphincter. I used the small [employer] anoscope that was well lubricated to inspect the anal canal which was notable for a large anterior anal fissure and just from the scope or there before a left lateral fissure as well. It was hard to see but there was a left  somewhat irregular appearing anal papilla. This was excised/biopsied with cautery and sent for pathology. The remainder of the anal canal was unremarkable although it was hard to get a great view given how tight her sphincter was despite sedation and local. There was this large somewhat irregular papilla but it was soft and the rest of the canal was soft with no obvious firmness or areas concerning for malignancy. We then proceeded to inject the botox 100 units total that was reconstituted in sterile normal saline. We injected 20 units on each side of the anterior fissure in the internal sphincter and then 20 units in each remaining quadrant.    Hemostasis was achieved. We then removed the anoscope and placed some fluffs for dressing. All sponge, needle and instrument counts were correct at the end of the procedure. The patient tolerated the procedure well and was awakened from anesthesia without difficulty, and transferred to the recovery room in stable condition.    Priscilla Orr MD  [facility]  [phone]

## 2022-10-28 NOTE — ANESTHESIA POSTPROCEDURE EVALUATION
Patient: Neelima Zuniga    Procedure: Procedure(s):  RECTAL EXAM UNDER ANESTHESIA  BOTOX INJECTION, EXCISION PAPILLA  POSSIBLE SPHINCTEROTOMY       Anesthesia Type:  MAC    Note:  Disposition: Outpatient   Postop Pain Control: Uneventful            Sign Out: Well controlled pain   PONV: No   Neuro/Psych: Uneventful            Sign Out: Acceptable/Baseline neuro status   Airway/Respiratory: Uneventful            Sign Out: Acceptable/Baseline resp. status   CV/Hemodynamics: Uneventful            Sign Out: Acceptable CV status; No obvious hypovolemia; No obvious fluid overload   Other NRE: NONE   DID A NON-ROUTINE EVENT OCCUR? No           Last vitals:  Vitals Value Taken Time   /72 10/28/22 1530   Temp 36.7  C (98.1  F) 10/28/22 1445   Pulse 78 10/28/22 1532   Resp 16 10/28/22 1445   SpO2 95 % 10/28/22 1532   Vitals shown include unvalidated device data.    Electronically Signed By: Issac Dejesus MD  October 28, 2022  5:25 PM

## 2022-10-28 NOTE — ANESTHESIA PREPROCEDURE EVALUATION
Anesthesia Pre-Procedure Evaluation    Patient: Neelima Zuniga   MRN: [id number] : 1945        Procedure : Procedure(s):  RECTAL EXAM UNDER ANESTHESIA  WITH POSSIBLE BOTOX,  POSSIBLE SPHINCTEROTOMY          Past Medical History:   Diagnosis Date     Amblyopia     left eye     Anxiety      Breast cancer (H) 2007     Cancer (H) 2009    right breast     Hyperlipidemia      Osteopenia      Palpitations      Reflux esophagitis      Sleep apnea     CPAP     Uterine fibroid       Past Surgical History:   Procedure Laterality Date     BIOPSY BREAST Right 2007     BREAST SURGERY Right     reconstruction     BREAST SURGERY Left     augmentation     CATARACT IOL, RT/LT        SECTION       HYSTERECTOMY  2015     MAMMOPLASTY AUGMENTATION Bilateral      MASTECTOMY Right 2007     OOPHORECTOMY Bilateral 2015      Allergies   Allergen Reactions     Erythromycin      Metoprolol Nausea     Sulfa Drugs Rash      Social History     Tobacco Use     Smoking status: Never     Smokeless tobacco: Never   Substance Use Topics     Alcohol use: Yes      Wt Readings from Last 1 Encounters:   10/28/22 69.6 kg (153 lb 6.4 oz)        Anesthesia Evaluation            ROS/MED HX  ENT/Pulmonary: Comment: SOB evaluated by cardiology - negative workup. Cleared for surgery - neg pulmonary ROS     Neurologic:  - neg neurologic ROS     Cardiovascular: Comment: TTE  EF=75%  Mild AS    (+) -----Irregular Heartbeat/Palpitations (SVT treated with dig),     METS/Exercise Tolerance:     Hematologic:  - neg hematologic  ROS     Musculoskeletal:  - neg musculoskeletal ROS     GI/Hepatic:  - neg GI/hepatic ROS     Renal/Genitourinary:  - neg Renal ROS     Endo:  - neg endo ROS     Psychiatric/Substance Use:  - neg psychiatric ROS     Infectious Disease:  - neg infectious disease ROS     Malignancy:  - neg malignancy ROS (+) Malignancy,     Other:  - neg other ROS          Physical Exam    Airway  airway exam normal      Mallampati: II        Respiratory Devices and Support         Dental  no notable dental history         Cardiovascular   cardiovascular exam normal       Rhythm and rate: regular and normal     Pulmonary   pulmonary exam normal        breath sounds clear to auscultation           OUTSIDE LABS:  CBC:   Lab Results   Component Value Date    WBC 5.0 04/06/2022    WBC 5.9 10/19/2021    HGB 14.2 04/06/2022    HGB 14.7 10/19/2021    HCT 42.3 04/06/2022    HCT 44.2 10/19/2021     04/06/2022     10/19/2021     BMP:   Lab Results   Component Value Date     09/27/2022     04/06/2022    POTASSIUM 4.2 09/27/2022    POTASSIUM 4.5 04/06/2022    CHLORIDE 102 09/27/2022    CHLORIDE 101 04/06/2022    CO2 27 09/27/2022    CO2 28 04/06/2022    BUN 18.7 09/27/2022    BUN 14 04/06/2022    CR 0.59 09/27/2022    CR 0.68 04/06/2022     (H) 09/27/2022     04/06/2022     COAGS: No results found for: PTT, INR, FIBR  POC: No results found for: BGM, HCG, HCGS  HEPATIC:   Lab Results   Component Value Date    ALBUMIN 4.3 09/27/2022    PROTTOTAL 6.0 (L) 09/27/2022    ALT 44 (H) 09/27/2022    AST 58 (H) 09/27/2022    ALKPHOS 85 09/27/2022    BILITOTAL 0.5 09/27/2022     OTHER:   Lab Results   Component Value Date    A1C 6.4 (H) 04/06/2022    BECKY 8.8 09/27/2022    TSH 1.31 07/20/2022    T4 0.98 07/20/2022       Anesthesia Plan    ASA Status:  2      Anesthesia Type: MAC.   Induction: Intravenous, Propofol.   Maintenance: TIVA.        Consents    Anesthesia Plan(s) and associated risks, benefits, and realistic alternatives discussed. Questions answered and patient/representative(s) expressed understanding.    - Discussed:     - Discussed with:  Patient      - Extended Intubation/Ventilatory Support Discussed: No.      - Patient is DNR/DNI Status: No    Use of blood products discussed: No .     Postoperative Care    Pain management: IV analgesics, Oral pain medications.   PONV prophylaxis: Ondansetron (or other 5HT-3),  Dexamethasone or Solumedrol     Comments:    Other Comments: The patient understands and accepts the risks of MAC anesthesia including (but not limited to) nausea, vomiting, dizziness, and chipped teeth. I also discussed the possibility of conversion to GAETT/GALMA anesthesia which include hoarse voice, sore throat, and pinched lip or chipped teeth.  Versed/fent  propofol ggt  Decadron/zofran            Issac Dejesus MD

## 2022-10-28 NOTE — DISCHARGE INSTRUCTIONS
You have just undergone anorectal surgery.  Here are a few things to expect after your surgery:  1) Everybody has pain - this is expected.  You should have been given a prescription(s) for pain medication - USE IT!  In addition to the prescription medications you can use Ibuprofen/Motrin/Advil 600-800mg every 6-8 hours (do not use these medications if you have Crohn s Disease, Ulcerative Colitis, or kidney problems).  Many patients also find it helps to soak in a warm tub for at least 15 minutes at a time, three to four times a day if possible.  2) Everybody has some spotting or mild amounts of bleeding/bloody drainage.  If you see more significant bleeding, try holding some pressure over the wound for 15-20 minutes.   If you pass more than a cup full of blood or you cannot get the bleeding to stop by holding pressure, call the office.  3) You may have a small amount of foam packing in your rectum. This will come out on its own with your first bowel movement.  4) Infections rarely occur in this area after surgery.  If you see small amounts of yellowish/pus like drainage in the days following surgery, this is expected.  It is also normal to run a low grade fever (below 101.5 F) following surgery.  5) You will feel numb in the anorectal area for 4-6 hours after surgery due to the numbing medication used in the operating room.  It is possible you may experience some fecal incontinence (accidental passage of gas/stool) during this time.  The numbness will resolve on its own.  Once you feel sensation returning, you should consider taking something for pain as you can expect oral medications to take 30-60 minutes before you start feeling their effects.  6) There is generally no specific wound care necessary immediately after surgery.  Simply showering/bathing 1-2 times a day and after bowel movements is sufficient to keep the wounds clean.  You may want to keep a dry gauze/pad over the wound site as it is normal to have  some amount of drainage, and this drainage can be irritating to the skin.  If you have a packing in the wound, your surgeon will give you more specific instructions on how to manage this.  7) For some operations you may have stitches in your wound.  Those stitches almost always break within a few days of surgery.  If you feel a pop or the wound opens - this is OK.  The wound will continue to fill in on its own.  It is still ok to shower/bathe as normal.  Expect some amount of drainage if the wound is open.  8) Do not allow yourself to go more than 2 or 3 days after surgery without having had a bowel movement.  If you wait too long, the first bowel movement may be unpleasant.  If you haven t had a bowel after 2 days, take something by mouth to help you go.  Here are a few options:  - Milk of Magnesia 30 mL every 8 hours as necessary  - Miralax 1-2 capfuls daily as necessary  - Senna 1-2 tabs twice a day as necessary  9) Your only activity restrictions are no driving while you are taking narcotics.  You may want to avoid heavy lifting/strenuous exercise for the first 1-2 days after surgery, but if you feel up to resuming normal activities/exercise, this is ok.  10) Lastly, if you have any questions or concerns - PLEASE CALL ([phone])!  Our office has someone on call 24 hours a day.  If your question is one that can wait until normal business hours (Mon-Fri 8:30AM-5PM), it is better to wait until the daytime as someone more familiar with your care will be able to help you.  However, if it is an emergency, the on-call surgeon will be able to give you good advice.

## 2022-10-31 LAB
PATH REPORT.COMMENTS IMP SPEC: NORMAL
PATH REPORT.COMMENTS IMP SPEC: NORMAL
PATH REPORT.FINAL DX SPEC: NORMAL
PATH REPORT.GROSS SPEC: NORMAL
PATH REPORT.MICROSCOPIC SPEC OTHER STN: NORMAL
PATH REPORT.RELEVANT HX SPEC: NORMAL
PHOTO IMAGE: NORMAL

## 2022-10-31 PROCEDURE — 88304 TISSUE EXAM BY PATHOLOGIST: CPT | Mod: 26 | Performed by: PATHOLOGY

## 2022-11-29 ENCOUNTER — OFFICE VISIT (OUTPATIENT)
Dept: CARDIOLOGY | Facility: CLINIC | Age: 77
End: 2022-11-29
Attending: INTERNAL MEDICINE
Payer: MEDICARE

## 2022-11-29 VITALS
WEIGHT: 154 LBS | SYSTOLIC BLOOD PRESSURE: 140 MMHG | HEIGHT: 64 IN | HEART RATE: 86 BPM | BODY MASS INDEX: 26.29 KG/M2 | RESPIRATION RATE: 16 BRPM | DIASTOLIC BLOOD PRESSURE: 70 MMHG

## 2022-11-29 DIAGNOSIS — I47.10 SVT (SUPRAVENTRICULAR TACHYCARDIA) (H): ICD-10-CM

## 2022-11-29 DIAGNOSIS — Z51.81 ENCOUNTER FOR THERAPEUTIC DRUG LEVEL MONITORING: ICD-10-CM

## 2022-11-29 DIAGNOSIS — Q24.8 LEFT VENTRICULAR OUTFLOW TRACT OBSTRUCTION: ICD-10-CM

## 2022-11-29 DIAGNOSIS — R06.09 DOE (DYSPNEA ON EXERTION): ICD-10-CM

## 2022-11-29 DIAGNOSIS — I47.10 PAROXYSMAL SUPRAVENTRICULAR TACHYCARDIA (H): ICD-10-CM

## 2022-11-29 PROCEDURE — 99214 OFFICE O/P EST MOD 30 MIN: CPT | Performed by: INTERNAL MEDICINE

## 2022-11-29 RX ORDER — LISINOPRIL 20 MG/1
30 TABLET ORAL DAILY
COMMUNITY
Start: 2022-10-19 | End: 2023-01-27

## 2022-11-29 RX ORDER — DIGOXIN 250 MCG
0.25 TABLET ORAL DAILY
Qty: 90 TABLET | Refills: 3 | Status: SHIPPED | OUTPATIENT
Start: 2022-11-29

## 2022-11-29 RX ORDER — VENLAFAXINE 37.5 MG/1
37.5 TABLET ORAL DAILY
COMMUNITY
End: 2024-02-01

## 2022-11-29 NOTE — LETTER
11/29/2022    Barber Valladares MD  [facility] [address]  [place] [address]    RE: Neelima Zuniga       Dear Colleague,     I had the pleasure of seeing Neelima Zuniga in the [facility].      [facility]  [phone]      Assessment/Recommendations   Patient with known distant history of SVT which has been well controlled on digoxin.  She also had some abnormal EKG changes and an echocardiogram suggested dynamic outflow tract obstruction as well as mild aortic stenosis.  She had been started on hydrochlorothiazide and this potentially could have caused some volume depletion and left ventricular outflow tract obstruction from hyperdynamic left ventricle.    She is now off of the hydrochlorothiazide and her breathing is better and she started to go to the [facility] for workouts.  I have recommended that she continue at the [facility] and after the first of the year we rechecked the echocardiogram to see if the dynamic outflow tract obstruction is gone.  If it is not we may want to consider other medications other than digoxin or something with some negative inotropic effect.    She did have an exercise test earlier this year which did not show evidence of myocardial ischemia.    We will get back to her with results of echocardiogram and any further recommendations.  30 minutes spent with chart review, patient visit, and documentation.     History of Present Illness/Subjective    Ms. Neelima Zuniga is a 77 year old female with known hypertension, known distant history of SVT.  She has been on metoprolol in the past which she did not tolerate and flecainide gave her side effects as well.  Digoxin seems to have kept the SVT at bay and she has not had any side effects.  She did have shortness of breath with activity and a stress test earlier this year was unremarkable.  A recent echocardiogram after an abnormal EKG showing T wave changes in the  "lateral leads, showed left ventricular hypertrophy, dynamic outflow tract obstruction, high normal left ventricular ejection fraction and mild aortic stenosis.  She was taking hydrochlorothiazide at the time and this was discontinued and her breathing got better.  She started to go to the [facility] now.  She denies orthopnea or paroxysmal nocturnal dyspnea or chest discomfort.         Physical Examination Review of Systems   BP (!) 140/70 (BP Location: Left arm, Patient Position: Sitting, Cuff Size: Adult Regular)   Pulse 86   Resp 16   Ht 1.626 m (5' 4.02\")   Wt 69.9 kg (154 lb)   BMI 26.42 kg/m    Body mass index is 26.42 kg/m .  Wt Readings from Last 3 Encounters:   11/29/22 69.9 kg (154 lb)   10/28/22 69.6 kg (153 lb 6.4 oz)   10/18/22 70.3 kg (155 lb)     General Appearance:   Alert, cooperative and in no acute distress.   ENT/Mouth: Patient wearing a mask.      EYES:  no scleral icterus, normal conjunctivae   Neck: JVP normal. No Hepatojugular reflux. Thyroid not visualized.   Chest/Lungs:   Lungs are clear to auscultation, equal chest wall expansion.   Cardiovascular:   S1, S2 with 3/6 systolic murmur , no clicks or rubs. Brachial, radial  pulses are intact and symetric. No carotid bruits noted   Abdomen:  Nontender.   Extremities: No cyanosis, clubbing or edema   Skin: no xanthelasma, warm.    Neurologic: normal arm movement bilateral, no tremors     Psychiatric: Appropriate affect.      Enc Vitals  BP: (!) 140/70  Pulse: 86  Resp: 16  Weight: 69.9 kg (154 lb) (With shoes.)  Height: 162.6 cm (5' 4.02\")                                           Medical History  Surgical History Family History Social History   Past Medical History:   Diagnosis Date     Amblyopia     left eye     Anxiety      Breast cancer (H) 2007     Cancer (H) 2009    right breast     Hyperlipidemia      Osteopenia      Palpitations      Reflux esophagitis      Sleep apnea     CPAP     Uterine fibroid     Past Surgical History:   Procedure " Laterality Date     BIOPSY BREAST Right 2007     BREAST SURGERY Right     reconstruction     BREAST SURGERY Left     augmentation     CATARACT IOL, RT/LT        SECTION       EXAM UNDER ANESTHESIA RECTUM N/A 10/28/2022    Procedure: RECTAL EXAM UNDER ANESTHESIA;  Surgeon: Priscilla Orr MD;  [place] [facility] [place]     EXAM UNDER ANESTHESIA, INJECT BOTOX N/A 10/28/2022    Procedure: BOTOX INJECTION, EXCISION PAPILLA;  Surgeon: Priscilla Orr MD;  Location: [facility] [place]     HYSTERECTOMY  2015     MAMMOPLASTY AUGMENTATION Bilateral      MASTECTOMY Right 2007     OOPHORECTOMY Bilateral 2015     SPHINCTEROTOMY RECTUM N/A 10/28/2022    Procedure: POSSIBLE SPHINCTEROTOMY;  Surgeon: Priscilla Orr MD;  [place] [facility] [place]    Family History   Problem Relation Age of Onset     Macular Degeneration Father      Glaucoma Father      Glaucoma Paternal Grandmother      Diabetes Mother      Hypertension Mother      Hypertension Father      Hyperlipidemia Father     Social History     Socioeconomic History     Marital status:      Spouse name: Not on file     Number of children: Not on file     Years of education: Not on file     Highest education level: Not on file   Occupational History     Not on file   Tobacco Use     Smoking status: Never     Smokeless tobacco: Never   Vaping Use     Vaping Use: Never used   Substance and Sexual Activity     Alcohol use: Yes     Drug use: No     Sexual activity: Yes     Partners: Male   Other Topics Concern     Parent/sibling w/ CABG, MI or angioplasty before 65F 55M? Not Asked   Social History Narrative     Not on file     Social Determinants of Health     Financial Resource Strain: Not on file   Food Insecurity: Not on file   Transportation Needs: Not on file   Physical Activity: Not on file   Stress: Not on file   Social Connections: Not on file   Intimate Partner Violence: Not At Risk     Fear of Current or Ex-Partner: No     Emotionally Abused: No      Physically Abused: No     Sexually Abused: No   Housing Stability: Not on file          Medications  Allergies   Current Outpatient Medications   Medication Sig Dispense Refill     ANECREAM5 5 % CREA lidocaine       calcium-vitamin D (CALTRATE) 600-400 MG-UNIT per tablet Take 1 tablet by mouth 2 times daily       digoxin (LANOXIN) 250 MCG tablet Take 1 tablet (0.25 mg) by mouth daily 90 tablet 3     eszopiclone (LUNESTA) 2 MG tablet Take 2 mg by mouth nightly as needed for sleep       Glucosamine Sulfate 1500 MG PACK Take 1,500 mg by mouth daily       Lactobacillus Rhamnosus, GG, ( PROBIOTIC DIGESTIVE CARE) CAPS Take 1 capsule by mouth daily       lidocaine (XYLOCAINE) 2 % external gel Apply topically every 4 hours as needed for moderate pain (Apply thick layer to anal opening up to 6 times per day.) 30 mL 0     lisinopril (ZESTRIL) 20 MG tablet Take 30 mg by mouth daily Patient takes one and one-half tablets daily for 30 mg.       MAGNESIUM OXIDE PO Take 400 mg by mouth daily       omeprazole (PRILOSEC) 40 MG DR capsule Take 40 mg by mouth every morning       polyethylene glycol (MIRALAX/GLYCOLAX) packet Take 1 packet by mouth daily       ROSUVASTATIN CALCIUM PO Take 10 mg by mouth daily       venlafaxine (EFFEXOR) 37.5 MG tablet Take 37.5 mg by mouth daily       venlafaxine (EFFEXOR-XR) 37.5 MG 24 hr capsule Take 1 capsule (37.5 mg) by mouth daily 90 capsule 3    Allergies   Allergen Reactions     Erythromycin      Metoprolol Nausea     Sulfa Drugs Rash         Lab Results    Chemistry/lipid CBC Cardiac Enzymes/BNP/TSH/INR   Lab Results   Component Value Date    CHOL 108 09/27/2022    HDL 34 (L) 09/27/2022    TRIG 218 (H) 09/27/2022    BUN 18.7 09/27/2022     09/27/2022    CO2 27 09/27/2022    Lab Results   Component Value Date    WBC 5.0 04/06/2022    HGB 14.2 04/06/2022    HCT 42.3 04/06/2022    MCV 89 04/06/2022     04/06/2022    Lab Results   Component Value Date    TSH 1.31 07/20/2022             Thank you for allowing me to participate in the care of your patient.      Sincerely,     Jose Zuniga MD     [facility]  cc:   David Marshall MD  [address], [address]  [place] [address]

## 2022-11-29 NOTE — PROGRESS NOTES
[facility]  [phone]      Assessment/Recommendations   Patient with known distant history of SVT which has been well controlled on digoxin.  She also had some abnormal EKG changes and an echocardiogram suggested dynamic outflow tract obstruction as well as mild aortic stenosis.  She had been started on hydrochlorothiazide and this potentially could have caused some volume depletion and left ventricular outflow tract obstruction from hyperdynamic left ventricle.    She is now off of the hydrochlorothiazide and her breathing is better and she started to go to the [facility] for workouts.  I have recommended that she continue at the [facility] and after the first of the year we rechecked the echocardiogram to see if the dynamic outflow tract obstruction is gone.  If it is not we may want to consider other medications other than digoxin or something with some negative inotropic effect.    She did have an exercise test earlier this year which did not show evidence of myocardial ischemia.    We will get back to her with results of echocardiogram and any further recommendations.  30 minutes spent with chart review, patient visit, and documentation.     History of Present Illness/Subjective    Ms. Neelima Zuniga is a 77 year old female with known hypertension, known distant history of SVT.  She has been on metoprolol in the past which she did not tolerate and flecainide gave her side effects as well.  Digoxin seems to have kept the SVT at bay and she has not had any side effects.  She did have shortness of breath with activity and a stress test earlier this year was unremarkable.  A recent echocardiogram after an abnormal EKG showing T wave changes in the lateral leads, showed left ventricular hypertrophy, dynamic outflow tract obstruction, high normal left ventricular ejection fraction and mild aortic stenosis.  She was taking hydrochlorothiazide at the time and this was discontinued and her breathing  "got better.  She started to go to the [facility] now.  She denies orthopnea or paroxysmal nocturnal dyspnea or chest discomfort.         Physical Examination Review of Systems   BP (!) 140/70 (BP Location: Left arm, Patient Position: Sitting, Cuff Size: Adult Regular)   Pulse 86   Resp 16   Ht 1.626 m (5' 4.02\")   Wt 69.9 kg (154 lb)   BMI 26.42 kg/m    Body mass index is 26.42 kg/m .  Wt Readings from Last 3 Encounters:   22 69.9 kg (154 lb)   10/28/22 69.6 kg (153 lb 6.4 oz)   10/18/22 70.3 kg (155 lb)     General Appearance:   Alert, cooperative and in no acute distress.   ENT/Mouth: Patient wearing a mask.      EYES:  no scleral icterus, normal conjunctivae   Neck: JVP normal. No Hepatojugular reflux. Thyroid not visualized.   Chest/Lungs:   Lungs are clear to auscultation, equal chest wall expansion.   Cardiovascular:   S1, S2 with 3/6 systolic murmur , no clicks or rubs. Brachial, radial  pulses are intact and symetric. No carotid bruits noted   Abdomen:  Nontender.   Extremities: No cyanosis, clubbing or edema   Skin: no xanthelasma, warm.    Neurologic: normal arm movement bilateral, no tremors     Psychiatric: Appropriate affect.      Enc Vitals  BP: (!) 140/70  Pulse: 86  Resp: 16  Weight: 69.9 kg (154 lb) (With shoes.)  Height: 162.6 cm (5' 4.02\")                                           Medical History  Surgical History Family History Social History   Past Medical History:   Diagnosis Date     Amblyopia     left eye     Anxiety      Breast cancer (H) 2007     Cancer (H) 2009    right breast     Hyperlipidemia      Osteopenia      Palpitations      Reflux esophagitis      Sleep apnea     CPAP     Uterine fibroid     Past Surgical History:   Procedure Laterality Date     BIOPSY BREAST Right 2007     BREAST SURGERY Right     reconstruction     BREAST SURGERY Left     augmentation     CATARACT IOL, RT/LT        SECTION       EXAM UNDER ANESTHESIA RECTUM N/A 10/28/2022    Procedure: RECTAL EXAM " UNDER ANESTHESIA;  Surgeon: Priscilla Orr MD;  Location: [facility] OR     EXAM UNDER ANESTHESIA, INJECT BOTOX N/A 10/28/2022    Procedure: BOTOX INJECTION, EXCISION PAPILLA;  Surgeon: Priscilla Orr MD;  Location: [facility] OR     HYSTERECTOMY  2015     MAMMOPLASTY AUGMENTATION Bilateral      MASTECTOMY Right 9/2007     OOPHORECTOMY Bilateral 2015     SPHINCTEROTOMY RECTUM N/A 10/28/2022    Procedure: POSSIBLE SPHINCTEROTOMY;  Surgeon: Priscilla Orr MD;  Location: [facility] OR    Family History   Problem Relation Age of Onset     Macular Degeneration Father      Glaucoma Father      Glaucoma Paternal Grandmother      Diabetes Mother      Hypertension Mother      Hypertension Father      Hyperlipidemia Father     Social History     Socioeconomic History     Marital status:      Spouse name: Not on file     Number of children: Not on file     Years of education: Not on file     Highest education level: Not on file   Occupational History     Not on file   Tobacco Use     Smoking status: Never     Smokeless tobacco: Never   Vaping Use     Vaping Use: Never used   Substance and Sexual Activity     Alcohol use: Yes     Drug use: No     Sexual activity: Yes     Partners: Male   Other Topics Concern     Parent/sibling w/ CABG, MI or angioplasty before 65F 55M? Not Asked   Social History Narrative     Not on file     Social Determinants of Health     Financial Resource Strain: Not on file   Food Insecurity: Not on file   Transportation Needs: Not on file   Physical Activity: Not on file   Stress: Not on file   Social Connections: Not on file   Intimate Partner Violence: Not At Risk     Fear of Current or Ex-Partner: No     Emotionally Abused: No     Physically Abused: No     Sexually Abused: No   Housing Stability: Not on file          Medications  Allergies   Current Outpatient Medications   Medication Sig Dispense Refill     ANECREAM5 5 % CREA lidocaine       calcium-vitamin D (CALTRATE)  600-400 MG-UNIT per tablet Take 1 tablet by mouth 2 times daily       digoxin (LANOXIN) 250 MCG tablet Take 1 tablet (0.25 mg) by mouth daily 90 tablet 3     eszopiclone (LUNESTA) 2 MG tablet Take 2 mg by mouth nightly as needed for sleep       Glucosamine Sulfate 1500 MG PACK Take 1,500 mg by mouth daily       Lactobacillus Rhamnosus, GG, ( PROBIOTIC DIGESTIVE CARE) CAPS Take 1 capsule by mouth daily       lidocaine (XYLOCAINE) 2 % external gel Apply topically every 4 hours as needed for moderate pain (Apply thick layer to anal opening up to 6 times per day.) 30 mL 0     lisinopril (ZESTRIL) 20 MG tablet Take 30 mg by mouth daily Patient takes one and one-half tablets daily for 30 mg.       MAGNESIUM OXIDE PO Take 400 mg by mouth daily       omeprazole (PRILOSEC) 40 MG DR capsule Take 40 mg by mouth every morning       polyethylene glycol (MIRALAX/GLYCOLAX) packet Take 1 packet by mouth daily       ROSUVASTATIN CALCIUM PO Take 10 mg by mouth daily       venlafaxine (EFFEXOR) 37.5 MG tablet Take 37.5 mg by mouth daily       venlafaxine (EFFEXOR-XR) 37.5 MG 24 hr capsule Take 1 capsule (37.5 mg) by mouth daily 90 capsule 3    Allergies   Allergen Reactions     Erythromycin      Metoprolol Nausea     Sulfa Drugs Rash         Lab Results    Chemistry/lipid CBC Cardiac Enzymes/BNP/TSH/INR   Lab Results   Component Value Date    CHOL 108 09/27/2022    HDL 34 (L) 09/27/2022    TRIG 218 (H) 09/27/2022    BUN 18.7 09/27/2022     09/27/2022    CO2 27 09/27/2022    Lab Results   Component Value Date    WBC 5.0 04/06/2022    HGB 14.2 04/06/2022    HCT 42.3 04/06/2022    MCV 89 04/06/2022     04/06/2022    Lab Results   Component Value Date    TSH 1.31 07/20/2022

## 2022-12-21 ENCOUNTER — LAB REQUISITION (OUTPATIENT)
Dept: LAB | Facility: CLINIC | Age: 77
End: 2022-12-21
Payer: MEDICARE

## 2022-12-21 LAB
ALBUMIN SERPL BCG-MCNC: 4.7 G/DL (ref 3.5–5.2)
ALP SERPL-CCNC: 89 U/L (ref 35–104)
ALT SERPL W P-5'-P-CCNC: 22 U/L (ref 10–35)
ANION GAP SERPL CALCULATED.3IONS-SCNC: 16 MMOL/L (ref 7–15)
AST SERPL W P-5'-P-CCNC: 21 U/L (ref 10–35)
BILIRUB SERPL-MCNC: 0.5 MG/DL
BUN SERPL-MCNC: 12.8 MG/DL (ref 8–23)
CALCIUM SERPL-MCNC: 9.9 MG/DL (ref 8.8–10.2)
CHLORIDE SERPL-SCNC: 99 MMOL/L (ref 98–107)
CREAT SERPL-MCNC: 0.66 MG/DL (ref 0.51–0.95)
DEPRECATED HCO3 PLAS-SCNC: 28 MMOL/L (ref 22–29)
ERYTHROCYTE [DISTWIDTH] IN BLOOD BY AUTOMATED COUNT: 12.9 % (ref 10–15)
GFR SERPL CREATININE-BSD FRML MDRD: 89 ML/MIN/1.73M2
GLUCOSE SERPL-MCNC: 144 MG/DL (ref 70–99)
HCT VFR BLD AUTO: 46.8 % (ref 35–47)
HGB BLD-MCNC: 15 G/DL (ref 11.7–15.7)
MCH RBC QN AUTO: 29.1 PG (ref 26.5–33)
MCHC RBC AUTO-ENTMCNC: 32.1 G/DL (ref 31.5–36.5)
MCV RBC AUTO: 91 FL (ref 78–100)
PLATELET # BLD AUTO: 323 10E3/UL (ref 150–450)
POTASSIUM SERPL-SCNC: 4.3 MMOL/L (ref 3.4–5.3)
PROT SERPL-MCNC: 6.8 G/DL (ref 6.4–8.3)
RBC # BLD AUTO: 5.16 10E6/UL (ref 3.8–5.2)
SODIUM SERPL-SCNC: 143 MMOL/L (ref 136–145)
TSH SERPL DL<=0.005 MIU/L-ACNC: 1.88 UIU/ML (ref 0.3–4.2)
WBC # BLD AUTO: 7.3 10E3/UL (ref 4–11)

## 2022-12-21 PROCEDURE — 84443 ASSAY THYROID STIM HORMONE: CPT | Mod: ORL | Performed by: STUDENT IN AN ORGANIZED HEALTH CARE EDUCATION/TRAINING PROGRAM

## 2022-12-21 PROCEDURE — 80053 COMPREHEN METABOLIC PANEL: CPT | Mod: ORL | Performed by: STUDENT IN AN ORGANIZED HEALTH CARE EDUCATION/TRAINING PROGRAM

## 2022-12-21 PROCEDURE — 85027 COMPLETE CBC AUTOMATED: CPT | Mod: ORL | Performed by: STUDENT IN AN ORGANIZED HEALTH CARE EDUCATION/TRAINING PROGRAM

## 2022-12-28 ENCOUNTER — TRANSFERRED RECORDS (OUTPATIENT)
Dept: HEALTH INFORMATION MANAGEMENT | Facility: CLINIC | Age: 77
End: 2022-12-28
Payer: MEDICARE

## 2022-12-28 LAB — PHQ9 SCORE: 0

## 2023-01-01 NOTE — NURSING NOTE
Chief Complaints and History of Present Illnesses   Patient presents with     Droopy Eye Lid Evaluation     Chief Complaint(s) and History of Present Illness(es)     Droopy Eye Lid Evaluation     Laterality: left upper lid    Duration: 1 year    Associated signs and symptoms: Negative for double vision    Treatments tried: eye drops    Response to treatment: no improvement    Pain scale: 0/10              Comments     Pt states was referred here because of a droopy left eyelid. Pt has tested negative for myasthenis gravis and thyroid eye. No double vision. No pain. AT PRN each eye.    Starla Hernánedz COT 7:58 AM February 27, 2019                    2023 13:13

## 2023-01-11 ENCOUNTER — ONCOLOGY VISIT (OUTPATIENT)
Dept: ONCOLOGY | Facility: CLINIC | Age: 78
End: 2023-01-11
Attending: INTERNAL MEDICINE
Payer: MEDICARE

## 2023-01-11 VITALS
RESPIRATION RATE: 18 BRPM | SYSTOLIC BLOOD PRESSURE: 142 MMHG | OXYGEN SATURATION: 98 % | HEIGHT: 64 IN | WEIGHT: 155.8 LBS | HEART RATE: 95 BPM | DIASTOLIC BLOOD PRESSURE: 72 MMHG | BODY MASS INDEX: 26.6 KG/M2 | TEMPERATURE: 98.8 F

## 2023-01-11 DIAGNOSIS — C50.411 MALIGNANT NEOPLASM OF UPPER-OUTER QUADRANT OF RIGHT BREAST IN FEMALE, ESTROGEN RECEPTOR POSITIVE (H): Primary | ICD-10-CM

## 2023-01-11 DIAGNOSIS — Z12.31 ENCOUNTER FOR SCREENING MAMMOGRAM FOR MALIGNANT NEOPLASM OF BREAST: ICD-10-CM

## 2023-01-11 DIAGNOSIS — Z17.0 MALIGNANT NEOPLASM OF UPPER-OUTER QUADRANT OF RIGHT BREAST IN FEMALE, ESTROGEN RECEPTOR POSITIVE (H): Primary | ICD-10-CM

## 2023-01-11 PROCEDURE — G0463 HOSPITAL OUTPT CLINIC VISIT: HCPCS | Performed by: INTERNAL MEDICINE

## 2023-01-11 PROCEDURE — 99213 OFFICE O/P EST LOW 20 MIN: CPT | Performed by: INTERNAL MEDICINE

## 2023-01-11 ASSESSMENT — PAIN SCALES - GENERAL: PAINLEVEL: NO PAIN (0)

## 2023-01-11 NOTE — PROGRESS NOTES
Visit Date: 2023    Neelima Zuniga is a 77-year-old patient who is here today for interim followup.    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    HISTORY OF PRESENTING COMPLAINT:  Neelima is 77 years old.  She is here for her yearly visit.  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  She is status post right-sided mastectomy with reconstruction, and then she did try to do Evista for prevention but could not tolerate it.  She comes in every year for a physical exam and review of her mammogram.  I have reviewed her mammogram that was done in 2022 and that was normal.  She will be due for another one on the left side in 2023.  I have written that up today.  She is now a .  Her   in .  She also had a daughter who  suddenly at age 16.  She is not complaining today of any cough, shortness of breath, bone pain, any worrisome symptomatology.    REVIEW OF SYSTEMS:  A 10-point comprehensive review of systems is otherwise unremarkable.    SOCIAL HISTORY:  The patient lives alone.  She is a retired teacher.  She is a nonsmoker.    PHYSICAL EXAMINATION:    GENERAL:  She is well-appearing lady in no acute distress.  VITAL SIGNS:  Stable.  NECK:  No masses or goiter.  CHEST:  Clear to auscultation and percussion bilaterally.  HEART:  Sounds 1, 2 normal. No added sounds, no murmurs.  BREASTS:  Right breast: Status post right-sided mastectomy.  The scar looks good.  No further palpable masses.  Left breast: No palpable masses.  Right and left axillae negative.  GASTROINTESTINAL:  Abdomen is soft and nontender.  No hepatosplenomegaly.  EXTREMITIES:  Legs without tenderness or edema.  NEUROLOGIC:  Peripheral neurological exam grossly intact.    DATA REVIEW:  I have reviewed her mammogram, which she had done in the summer of .  She also had lab work done on 2022 that was within normal limits.  She is having her glucose followed by her  family practitioner.    IMPRESSION:  A 77-year-old patient with a history of DCIS and LCIS, status post right-sided mastectomy and reconstruction.  We will continue to do her yearly mammogram on the left side and she can see me every 12 to 18 months for physical exam.    Charmaine Wilson MD        D: 2023   T: 2023   MT: [id number]    Name:     SUSAN ROJASLesly  MRN:      [id number]        Account:    [id number]   :      1945           Visit Date: 2023     Document: [id number]

## 2023-01-11 NOTE — LETTER
2023         RE: Neelima Zuniga  [address]  [place] [address]        Dear Colleague,    Thank you for referring your patient, Neelima Zuniga, to the [facility] [place]. Please see a copy of my visit note below.    Visit Date: 2023    Neelima Zuniga is a 77-year-old patient who is here today for interim followup.    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    HISTORY OF PRESENTING COMPLAINT:  Neelima is 77 years old.  She is here for her yearly visit.  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  She is status post right-sided mastectomy with reconstruction, and then she did try to do Evista for prevention but could not tolerate it.  She comes in every year for a physical exam and review of her mammogram.  I have reviewed her mammogram that was done in 2022 and that was normal.  She will be due for another one on the left side in 2023.  I have written that up today.  She is now a .  Her   in .  She also had a daughter who  suddenly at age 16.  She is not complaining today of any cough, shortness of breath, bone pain, any worrisome symptomatology.    REVIEW OF SYSTEMS:  A 10-point comprehensive review of systems is otherwise unremarkable.    SOCIAL HISTORY:  The patient lives alone.  She is a retired teacher.  She is a nonsmoker.    PHYSICAL EXAMINATION:    GENERAL:  She is well-appearing lady in no acute distress.  VITAL SIGNS:  Stable.  NECK:  No masses or goiter.  CHEST:  Clear to auscultation and percussion bilaterally.  HEART:  Sounds 1, 2 normal. No added sounds, no murmurs.  BREASTS:  Right breast: Status post right-sided mastectomy.  The scar looks good.  No further palpable masses.  Left breast: No palpable masses.  Right and left axillae negative.  GASTROINTESTINAL:  Abdomen is soft and nontender.  No hepatosplenomegaly.  EXTREMITIES:  Legs without tenderness or  edema.  NEUROLOGIC:  Peripheral neurological exam grossly intact.    DATA REVIEW:  I have reviewed her mammogram, which she had done in the summer of .  She also had lab work done on 2022 that was within normal limits.  She is having her glucose followed by her family practitioner.    IMPRESSION:  A 77-year-old patient with a history of DCIS and LCIS, status post right-sided mastectomy and reconstruction.  We will continue to do her yearly mammogram on the left side and she can see me every 12 to 18 months for physical exam.    Charmaine Wilson MD        D: 2023   T: 2023   MT: [id number]    Name:     SUSAN ROJAS  MRN:      [id number]        Account:    [id number]   :      1945           Visit Date: 2023     Document: [id number]      Again, thank you for allowing me to participate in the care of your patient.        Sincerely,        Charmaine Wilson MD

## 2023-01-11 NOTE — NURSING NOTE
"Oncology Rooming Note    January 11, 2023 2:32 PM   Neelima Zuniga is a 77 year old female who presents for:    Chief Complaint   Patient presents with     Oncology Clinic Visit     Malignant neoplasm of right breast      Initial Vitals: BP (!) 142/72   Pulse 95   Temp 98.8  F (37.1  C) (Tympanic)   Resp 18   Ht 1.626 m (5' 4.02\")   Wt 70.7 kg (155 lb 12.8 oz)   SpO2 98%   BMI 26.73 kg/m   Estimated body mass index is 26.73 kg/m  as calculated from the following:    Height as of this encounter: 1.626 m (5' 4.02\").    Weight as of this encounter: 70.7 kg (155 lb 12.8 oz). Body surface area is 1.79 meters squared.  No Pain (0) Comment: Data Unavailable   No LMP recorded.  Allergies reviewed: Yes  Medications reviewed: Yes    Medications: Medication refills not needed today.  Pharmacy name entered into SEA:    [id number] [place], [place] [facility] [address]  [id number] - [place] [address]    Clinical concerns: f/u       Shanthi Corona CMA              "

## 2023-01-12 ENCOUNTER — HOSPITAL ENCOUNTER (OUTPATIENT)
Dept: CARDIOLOGY | Facility: HOSPITAL | Age: 78
Discharge: HOME | End: 2023-01-12
Attending: INTERNAL MEDICINE | Admitting: INTERNAL MEDICINE
Payer: MEDICARE

## 2023-01-12 DIAGNOSIS — R06.09 DOE (DYSPNEA ON EXERTION): ICD-10-CM

## 2023-01-12 DIAGNOSIS — Q24.8 LEFT VENTRICULAR OUTFLOW TRACT OBSTRUCTION: ICD-10-CM

## 2023-01-12 DIAGNOSIS — Z51.81 ENCOUNTER FOR THERAPEUTIC DRUG LEVEL MONITORING: ICD-10-CM

## 2023-01-12 DIAGNOSIS — I47.10 SVT (SUPRAVENTRICULAR TACHYCARDIA) (H): ICD-10-CM

## 2023-01-12 DIAGNOSIS — I47.10 PAROXYSMAL SUPRAVENTRICULAR TACHYCARDIA (H): ICD-10-CM

## 2023-01-12 LAB — LVEF ECHO: NORMAL

## 2023-01-12 PROCEDURE — 255N000002 HC RX 255 OP 636: Performed by: INTERNAL MEDICINE

## 2023-01-12 PROCEDURE — 93306 TTE W/DOPPLER COMPLETE: CPT | Mod: 26 | Performed by: INTERNAL MEDICINE

## 2023-01-12 PROCEDURE — 999N000208 ECHOCARDIOGRAM COMPLETE

## 2023-01-12 RX ADMIN — PERFLUTREN 2 ML: 6.52 INJECTION, SUSPENSION INTRAVENOUS at 10:35

## 2023-01-16 DIAGNOSIS — I35.0 AORTIC STENOSIS: Primary | ICD-10-CM

## 2023-01-27 ENCOUNTER — OFFICE VISIT (OUTPATIENT)
Dept: CARDIOLOGY | Facility: CLINIC | Age: 78
End: 2023-01-27
Payer: MEDICARE

## 2023-01-27 VITALS
DIASTOLIC BLOOD PRESSURE: 69 MMHG | WEIGHT: 157 LBS | SYSTOLIC BLOOD PRESSURE: 132 MMHG | HEART RATE: 88 BPM | OXYGEN SATURATION: 96 % | RESPIRATION RATE: 18 BRPM | BODY MASS INDEX: 26.93 KG/M2

## 2023-01-27 DIAGNOSIS — I10 BENIGN ESSENTIAL HYPERTENSION: Primary | ICD-10-CM

## 2023-01-27 DIAGNOSIS — I35.0 NONRHEUMATIC AORTIC VALVE STENOSIS: ICD-10-CM

## 2023-01-27 PROBLEM — Q24.8 LEFT VENTRICULAR OUTFLOW TRACT OBSTRUCTION: Status: RESOLVED | Noted: 2022-11-29 | Resolved: 2023-01-27

## 2023-01-27 PROCEDURE — 99215 OFFICE O/P EST HI 40 MIN: CPT | Performed by: INTERNAL MEDICINE

## 2023-01-27 RX ORDER — AMLODIPINE BESYLATE 2.5 MG/1
5 TABLET ORAL DAILY
Qty: 90 TABLET | Refills: 3 | Status: SHIPPED | OUTPATIENT
Start: 2023-01-27 | End: 2023-03-09

## 2023-01-27 RX ORDER — AMLODIPINE BESYLATE 2.5 MG/1
TABLET ORAL
COMMUNITY
Start: 2022-12-28 | End: 2023-01-27

## 2023-01-27 NOTE — PROGRESS NOTES
[facility]  [phone]          Assessment/Recommendations   Patient with known hypertension, known history of SVT which digoxin is been quite successful at preventing.  She also had evidence for dynamic left ventricular outflow tract obstruction which was likely brought on with diuretic therapy and relative dehydration.  Repeat echocardiogram recently showed mild to moderate aortic stenosis but no left ventricular outflow tract velocity increase.  She had stopped the hydrochlorothiazide.  She got short of breath with hydrochlorothiazide and this went away when she stopped it.    Blood pressure is at goal today but in general has been a bit high.  She does have a cough which is persistent and we will try her off of the lisinopril and instead of 2.5 of amlodipine we will go to 5 mg of amlodipine.  We will asked her to call us with some blood pressures in 2 weeks and to let us know if the cough is better at that time as well.    We will repeat an echocardiogram in 1 year and I will see her thereafter to follow her aortic stenosis.    Thank you for allowing us to participate in her care.  40 minutes spent with chart review, patient visit, documentation and .       History of Present Illness/Subjective    Ms. Neelima Zuniga is a 77 year old female with known hypertension who also had a suspicion of dynamic left ventricular outflow tract obstruction.  She had been started on hydrochlorothiazide for hypertension and was more short of breath.  The echocardiogram also was concerning for the possibility of aortic stenosis.    Off of the hydrochlorothiazide we did repeat the echocardiogram and there was no evidence for dynamic left ventricular outflow tract obstruction.  She did have a mean gradient across aortic valve of 21 mmHg consistent with mild to moderate aortic stenosis.  She feels like her breathing is stable.  She continues to participate in Silver sneakers without  difficulties.  When she is resting or just putting around the house she can sometimes get so discomfort in her chest which lasts a couple minutes and then just goes away.  She never gets this with physical activity.  She denies orthopnea or paroxysmal nocturnal dyspnea.           Physical Examination Review of Systems   /69 (BP Location: Left arm, Patient Position: Sitting, Cuff Size: Adult Regular)   Pulse 88   Resp 18   Wt 71.2 kg (157 lb)   SpO2 96%   BMI 26.93 kg/m    Body mass index is 26.93 kg/m .  Wt Readings from Last 3 Encounters:   01/27/23 71.2 kg (157 lb)   01/11/23 70.7 kg (155 lb 12.8 oz)   11/29/22 69.9 kg (154 lb)     General Appearance:   Alert, cooperative and in no acute distress.   ENT/Mouth: Patient wearing a mask.      EYES:  no scleral icterus, normal conjunctivae   Neck: JVP normal. No Hepatojugular reflux. Thyroid not visualized.   Chest/Lungs:   Lungs are clear to auscultation, equal chest wall expansion.   Cardiovascular:   S1, S2 with 2/6 systolic murmur , no clicks or rubs. Brachial, radial and posterior tibial pulses are intact and symetric. No carotid bruits noted   Abdomen:  Nontender.    Extremities: No cyanosis, clubbing minimal ankle edema   Skin: no xanthelasma, warm.    Neurologic: normal arm movement bilateral, no tremors     Psychiatric: Appropriate affect.      Enc Vitals  BP: 132/69  Pulse: 88  Resp: 18  SpO2: 96 %  Weight: 71.2 kg (157 lb) (With boots on)                                           Medical History  Surgical History Family History Social History   Past Medical History:   Diagnosis Date     Amblyopia     left eye     Anxiety      Breast cancer (H) 2007     Cancer (H) 2009    right breast     Hyperlipidemia      Osteopenia      Palpitations      Reflux esophagitis      Sleep apnea     CPAP     Uterine fibroid     Past Surgical History:   Procedure Laterality Date     BIOPSY BREAST Right 2007     BREAST SURGERY Right     reconstruction     BREAST  SURGERY Left     augmentation     CATARACT IOL, RT/LT        SECTION       EXAM UNDER ANESTHESIA RECTUM N/A 10/28/2022    Procedure: RECTAL EXAM UNDER ANESTHESIA;  Surgeon: Priscilla Orr MD;  Location: [facility] [place]     EXAM UNDER ANESTHESIA, INJECT BOTOX N/A 10/28/2022    Procedure: BOTOX INJECTION, EXCISION PAPILLA;  Surgeon: Priscilla Orr MD;  Location: [facility] [place] OR     HYSTERECTOMY  2015     MAMMOPLASTY AUGMENTATION Bilateral      MASTECTOMY Right 2007     OOPHORECTOMY Bilateral 2015     SPHINCTEROTOMY RECTUM N/A 10/28/2022    Procedure: POSSIBLE SPHINCTEROTOMY;  Surgeon: Priscilla Orr MD;  Location: [facility] [place]    Family History   Problem Relation Age of Onset     Macular Degeneration Father      Glaucoma Father      Glaucoma Paternal Grandmother      Diabetes Mother      Hypertension Mother      Hypertension Father      Hyperlipidemia Father     Social History     Socioeconomic History     Marital status:      Spouse name: Not on file     Number of children: Not on file     Years of education: Not on file     Highest education level: Not on file   Occupational History     Not on file   Tobacco Use     Smoking status: Never     Smokeless tobacco: Never   Vaping Use     Vaping Use: Never used   Substance and Sexual Activity     Alcohol use: Yes     Drug use: No     Sexual activity: Yes     Partners: Male   Other Topics Concern     Parent/sibling w/ CABG, MI or angioplasty before 65F 55M? Not Asked   Social History Narrative     Not on file     Social Determinants of Health     Financial Resource Strain: Not on file   Food Insecurity: Not on file   Transportation Needs: Not on file   Physical Activity: Not on file   Stress: Not on file   Social Connections: Not on file   Intimate Partner Violence: Not At Risk     Fear of Current or Ex-Partner: No     Emotionally Abused: No     Physically Abused: No     Sexually Abused: No   Housing Stability: Not on file           Medications  Allergies   Current Outpatient Medications   Medication Sig Dispense Refill     amLODIPine (NORVASC) 2.5 MG tablet Take 2 tablets (5 mg) by mouth daily 90 tablet 3     calcium-vitamin D (CALTRATE) 600-400 MG-UNIT per tablet Take 1 tablet by mouth 2 times daily       digoxin (LANOXIN) 250 MCG tablet Take 1 tablet (0.25 mg) by mouth daily 90 tablet 3     eszopiclone (LUNESTA) 2 MG tablet Take 2 mg by mouth nightly as needed for sleep       Glucosamine Sulfate 1500 MG PACK Take 1,500 mg by mouth daily       Lactobacillus Rhamnosus, GG, ( PROBIOTIC DIGESTIVE CARE) CAPS Take 1 capsule by mouth daily       MAGNESIUM OXIDE PO Take 400 mg by mouth daily       MULTIPLE VITAMIN PO daily       omeprazole (PRILOSEC) 40 MG DR capsule Take 40 mg by mouth every morning       polyethylene glycol (MIRALAX/GLYCOLAX) packet Take 1 packet by mouth daily       ROSUVASTATIN CALCIUM PO Take 10 mg by mouth daily       saline 0.65 % SOLN Spray 1 spray in nostril       venlafaxine (EFFEXOR) 37.5 MG tablet Take 37.5 mg by mouth daily       venlafaxine (EFFEXOR-XR) 37.5 MG 24 hr capsule Take 1 capsule (37.5 mg) by mouth daily 90 capsule 3    Allergies   Allergen Reactions     Erythromycin      Metoprolol Nausea     Sulfa Drugs Rash         Lab Results    Chemistry/lipid CBC Cardiac Enzymes/BNP/TSH/INR   Lab Results   Component Value Date    CHOL 108 09/27/2022    HDL 34 (L) 09/27/2022    TRIG 218 (H) 09/27/2022    BUN 12.8 12/21/2022     12/21/2022    CO2 28 12/21/2022    Lab Results   Component Value Date    WBC 7.3 12/21/2022    HGB 15.0 12/21/2022    HCT 46.8 12/21/2022    MCV 91 12/21/2022     12/21/2022    Lab Results   Component Value Date    TSH 1.88 12/21/2022

## 2023-01-27 NOTE — LETTER
1/27/2023    Barber Valladares MD  [facility] [address]  [place] [address]    RE: Neelima Zuniga       Dear Colleague,     I had the pleasure of seeing Neelima Zuniga in the [facility].      [facility]  [phone]          Assessment/Recommendations   Patient with known hypertension, known history of SVT which digoxin is been quite successful at preventing.  She also had evidence for dynamic left ventricular outflow tract obstruction which was likely brought on with diuretic therapy and relative dehydration.  Repeat echocardiogram recently showed mild to moderate aortic stenosis but no left ventricular outflow tract velocity increase.  She had stopped the hydrochlorothiazide.  She got short of breath with hydrochlorothiazide and this went away when she stopped it.    Blood pressure is at goal today but in general has been a bit high.  She does have a cough which is persistent and we will try her off of the lisinopril and instead of 2.5 of amlodipine we will go to 5 mg of amlodipine.  We will asked her to call us with some blood pressures in 2 weeks and to let us know if the cough is better at that time as well.    We will repeat an echocardiogram in 1 year and I will see her thereafter to follow her aortic stenosis.    Thank you for allowing us to participate in her care.  40 minutes spent with chart review, patient visit, documentation and .       History of Present Illness/Subjective    Ms. Neelima Zuniga is a 77 year old female with known hypertension who also had a suspicion of dynamic left ventricular outflow tract obstruction.  She had been started on hydrochlorothiazide for hypertension and was more short of breath.  The echocardiogram also was concerning for the possibility of aortic stenosis.    Off of the hydrochlorothiazide we did repeat the echocardiogram and there was no evidence for dynamic left ventricular  outflow tract obstruction.  She did have a mean gradient across aortic valve of 21 mmHg consistent with mild to moderate aortic stenosis.  She feels like her breathing is stable.  She continues to participate in Silver sneakers without difficulties.  When she is resting or just putting around the house she can sometimes get so discomfort in her chest which lasts a couple minutes and then just goes away.  She never gets this with physical activity.  She denies orthopnea or paroxysmal nocturnal dyspnea.           Physical Examination Review of Systems   /69 (BP Location: Left arm, Patient Position: Sitting, Cuff Size: Adult Regular)   Pulse 88   Resp 18   Wt 71.2 kg (157 lb)   SpO2 96%   BMI 26.93 kg/m    Body mass index is 26.93 kg/m .  Wt Readings from Last 3 Encounters:   01/27/23 71.2 kg (157 lb)   01/11/23 70.7 kg (155 lb 12.8 oz)   11/29/22 69.9 kg (154 lb)     General Appearance:   Alert, cooperative and in no acute distress.   ENT/Mouth: Patient wearing a mask.      EYES:  no scleral icterus, normal conjunctivae   Neck: JVP normal. No Hepatojugular reflux. Thyroid not visualized.   Chest/Lungs:   Lungs are clear to auscultation, equal chest wall expansion.   Cardiovascular:   S1, S2 with 2/6 systolic murmur , no clicks or rubs. Brachial, radial and posterior tibial pulses are intact and symetric. No carotid bruits noted   Abdomen:  Nontender.    Extremities: No cyanosis, clubbing minimal ankle edema   Skin: no xanthelasma, warm.    Neurologic: normal arm movement bilateral, no tremors     Psychiatric: Appropriate affect.      Enc Vitals  BP: 132/69  Pulse: 88  Resp: 18  SpO2: 96 %  Weight: 71.2 kg (157 lb) (With boots on)                                           Medical History  Surgical History Family History Social History   Past Medical History:   Diagnosis Date     Amblyopia     left eye     Anxiety      Breast cancer (H) 2007     Cancer (H) 2009    right breast     Hyperlipidemia       Osteopenia      Palpitations      Reflux esophagitis      Sleep apnea     CPAP     Uterine fibroid     Past Surgical History:   Procedure Laterality Date     BIOPSY BREAST Right 2007     BREAST SURGERY Right     reconstruction     BREAST SURGERY Left     augmentation     CATARACT IOL, RT/LT        SECTION       EXAM UNDER ANESTHESIA RECTUM N/A 10/28/2022    Procedure: RECTAL EXAM UNDER ANESTHESIA;  Surgeon: Priscilla Orr MD;  Location: [facility]     EXAM UNDER ANESTHESIA, INJECT BOTOX N/A 10/28/2022    Procedure: BOTOX INJECTION, EXCISION PAPILLA;  Surgeon: Priscilla Orr MD;  Location: [facility] [place] OR     HYSTERECTOMY  2015     MAMMOPLASTY AUGMENTATION Bilateral      MASTECTOMY Right 2007     OOPHORECTOMY Bilateral 2015     SPHINCTEROTOMY RECTUM N/A 10/28/2022    Procedure: POSSIBLE SPHINCTEROTOMY;  Surgeon: Priscilla Orr MD;  Location: [facility]    Family History   Problem Relation Age of Onset     Macular Degeneration Father      Glaucoma Father      Glaucoma Paternal Grandmother      Diabetes Mother      Hypertension Mother      Hypertension Father      Hyperlipidemia Father     Social History     Socioeconomic History     Marital status:      Spouse name: Not on file     Number of children: Not on file     Years of education: Not on file     Highest education level: Not on file   Occupational History     Not on file   Tobacco Use     Smoking status: Never     Smokeless tobacco: Never   Vaping Use     Vaping Use: Never used   Substance and Sexual Activity     Alcohol use: Yes     Drug use: No     Sexual activity: Yes     Partners: Male   Other Topics Concern     Parent/sibling w/ CABG, MI or angioplasty before 65F 55M? Not Asked   Social History Narrative     Not on file     Social Determinants of Health     Financial Resource Strain: Not on file   Food Insecurity: Not on file   Transportation Needs: Not on file   Physical Activity: Not on file   Stress: Not on file    Social Connections: Not on file   Intimate Partner Violence: Not At Risk     Fear of Current or Ex-Partner: No     Emotionally Abused: No     Physically Abused: No     Sexually Abused: No   Housing Stability: Not on file          Medications  Allergies   Current Outpatient Medications   Medication Sig Dispense Refill     amLODIPine (NORVASC) 2.5 MG tablet Take 2 tablets (5 mg) by mouth daily 90 tablet 3     calcium-vitamin D (CALTRATE) 600-400 MG-UNIT per tablet Take 1 tablet by mouth 2 times daily       digoxin (LANOXIN) 250 MCG tablet Take 1 tablet (0.25 mg) by mouth daily 90 tablet 3     eszopiclone (LUNESTA) 2 MG tablet Take 2 mg by mouth nightly as needed for sleep       Glucosamine Sulfate 1500 MG PACK Take 1,500 mg by mouth daily       Lactobacillus Rhamnosus, GG, ( PROBIOTIC DIGESTIVE CARE) CAPS Take 1 capsule by mouth daily       MAGNESIUM OXIDE PO Take 400 mg by mouth daily       MULTIPLE VITAMIN PO daily       omeprazole (PRILOSEC) 40 MG DR capsule Take 40 mg by mouth every morning       polyethylene glycol (MIRALAX/GLYCOLAX) packet Take 1 packet by mouth daily       ROSUVASTATIN CALCIUM PO Take 10 mg by mouth daily       saline 0.65 % SOLN Spray 1 spray in nostril       venlafaxine (EFFEXOR) 37.5 MG tablet Take 37.5 mg by mouth daily       venlafaxine (EFFEXOR-XR) 37.5 MG 24 hr capsule Take 1 capsule (37.5 mg) by mouth daily 90 capsule 3    Allergies   Allergen Reactions     Erythromycin      Metoprolol Nausea     Sulfa Drugs Rash         Lab Results    Chemistry/lipid CBC Cardiac Enzymes/BNP/TSH/INR   Lab Results   Component Value Date    CHOL 108 09/27/2022    HDL 34 (L) 09/27/2022    TRIG 218 (H) 09/27/2022    BUN 12.8 12/21/2022     12/21/2022    CO2 28 12/21/2022    Lab Results   Component Value Date    WBC 7.3 12/21/2022    HGB 15.0 12/21/2022    HCT 46.8 12/21/2022    MCV 91 12/21/2022     12/21/2022    Lab Results   Component Value Date    TSH 1.88 12/21/2022                Thank  you for allowing me to participate in the care of your patient.      Sincerely,     Jose Zuniga MD     [facility]  cc:   No referring provider defined for this encounter.

## 2023-02-08 ENCOUNTER — TRANSFERRED RECORDS (OUTPATIENT)
Dept: HEALTH INFORMATION MANAGEMENT | Facility: CLINIC | Age: 78
End: 2023-02-08
Payer: MEDICARE

## 2023-02-08 LAB — RETINOPATHY: NORMAL

## 2023-03-09 DIAGNOSIS — I10 BENIGN ESSENTIAL HYPERTENSION: ICD-10-CM

## 2023-03-09 RX ORDER — AMLODIPINE BESYLATE 5 MG/1
5 TABLET ORAL DAILY
Qty: 90 TABLET | Refills: 3 | Status: SHIPPED | OUTPATIENT
Start: 2023-03-09 | End: 2024-02-01

## 2023-03-27 DIAGNOSIS — Z17.0 MALIGNANT NEOPLASM OF UPPER-OUTER QUADRANT OF RIGHT BREAST IN FEMALE, ESTROGEN RECEPTOR POSITIVE (H): ICD-10-CM

## 2023-03-27 DIAGNOSIS — N95.1 MENOPAUSAL SYNDROME (HOT FLASHES): Primary | ICD-10-CM

## 2023-03-27 DIAGNOSIS — C50.411 MALIGNANT NEOPLASM OF UPPER-OUTER QUADRANT OF RIGHT BREAST IN FEMALE, ESTROGEN RECEPTOR POSITIVE (H): ICD-10-CM

## 2023-03-27 RX ORDER — VENLAFAXINE 37.5 MG/1
37.5 TABLET ORAL DAILY
Qty: 90 TABLET | Refills: 3 | Status: CANCELLED | OUTPATIENT
Start: 2023-03-27

## 2023-03-27 RX ORDER — VENLAFAXINE HYDROCHLORIDE 37.5 MG/1
37.5 CAPSULE, EXTENDED RELEASE ORAL DAILY
Qty: 90 CAPSULE | Refills: 3 | Status: SHIPPED | OUTPATIENT
Start: 2023-03-27 | End: 2024-01-08

## 2023-03-27 NOTE — TELEPHONE ENCOUNTER
Pending Prescriptions:                       Disp   Refills    venlafaxine (EFFEXOR XR) 37.5 MG 24 hr ca*90 cap*3            Sig: Take 1 capsule (37.5 mg) by mouth daily          Last Written Prescription Date:  1/13/22  Last Fill Quantity: 90,   # refills: 3  Last Office Visit: 1/11/23  Future Office visit:       Routing refill request to provider for review/approval.    Francia Myers, RN, BSN, OCN  Nurse Care Coordinator  [facility] [place]  P: [phone]     F: [phone]

## 2023-03-28 NOTE — TELEPHONE ENCOUNTER
Signed Prescriptions:                        Disp   Refills    venlafaxine (EFFEXOR XR) 37.5 MG 24 hr cap*90 cap*3        Sig: Take 1 capsule (37.5 mg) by mouth daily  Authorizing Provider: BRANDY KILPATRICK, RN, BSN, OCN  Nurse Care Coordinator  [facility] [place]  P: [phone]     F: [phone]

## 2023-05-08 ENCOUNTER — LAB REQUISITION (OUTPATIENT)
Dept: LAB | Facility: CLINIC | Age: 78
End: 2023-05-08
Payer: MEDICARE

## 2023-05-08 DIAGNOSIS — N94.819 VULVODYNIA, UNSPECIFIED: ICD-10-CM

## 2023-05-08 PROCEDURE — 87205 SMEAR GRAM STAIN: CPT | Mod: ORL | Performed by: FAMILY MEDICINE

## 2023-05-09 LAB
CLUE CELLS: ABNORMAL
NUGENT SCORE: 8
WHITE BLOOD CELLS: ABNORMAL

## 2023-05-19 ENCOUNTER — LAB REQUISITION (OUTPATIENT)
Dept: LAB | Facility: CLINIC | Age: 78
End: 2023-05-19
Payer: MEDICARE

## 2023-05-19 DIAGNOSIS — T14.8XXD OTHER INJURY OF UNSPECIFIED BODY REGION, SUBSEQUENT ENCOUNTER: ICD-10-CM

## 2023-05-19 PROCEDURE — 87077 CULTURE AEROBIC IDENTIFY: CPT | Mod: ORL | Performed by: OBSTETRICS & GYNECOLOGY

## 2023-05-20 ENCOUNTER — HOSPITAL ENCOUNTER (OUTPATIENT)
Dept: CT IMAGING | Facility: HOSPITAL | Age: 78
Discharge: HOME | End: 2023-05-20
Attending: FAMILY MEDICINE | Admitting: FAMILY MEDICINE
Payer: MEDICARE

## 2023-05-20 DIAGNOSIS — K61.1 RECTAL ABSCESS: ICD-10-CM

## 2023-05-20 LAB
CREAT BLD-MCNC: 0.9 MG/DL (ref 0.6–1.1)
GFR SERPL CREATININE-BSD FRML MDRD: >60 ML/MIN/1.73M2

## 2023-05-20 PROCEDURE — 72193 CT PELVIS W/DYE: CPT

## 2023-05-20 PROCEDURE — 250N000011 HC RX IP 250 OP 636: Performed by: FAMILY MEDICINE

## 2023-05-20 PROCEDURE — 82565 ASSAY OF CREATININE: CPT

## 2023-05-20 RX ORDER — IOPAMIDOL 755 MG/ML
90 INJECTION, SOLUTION INTRAVASCULAR ONCE
Status: COMPLETED | OUTPATIENT
Start: 2023-05-20 | End: 2023-05-20

## 2023-05-20 RX ADMIN — IOPAMIDOL 90 ML: 755 INJECTION, SOLUTION INTRAVENOUS at 13:56

## 2023-05-23 LAB
BACTERIA SPEC CULT: ABNORMAL

## 2023-06-03 ENCOUNTER — HEALTH MAINTENANCE LETTER (OUTPATIENT)
Age: 78
End: 2023-06-03

## 2023-07-07 ENCOUNTER — LAB REQUISITION (OUTPATIENT)
Dept: LAB | Facility: CLINIC | Age: 78
End: 2023-07-07
Payer: MEDICARE

## 2023-07-07 DIAGNOSIS — E78.5 HYPERLIPIDEMIA, UNSPECIFIED: ICD-10-CM

## 2023-07-07 LAB
ALBUMIN SERPL BCG-MCNC: 4.6 G/DL (ref 3.5–5.2)
ALP SERPL-CCNC: 83 U/L (ref 35–104)
ALT SERPL W P-5'-P-CCNC: 17 U/L (ref 0–50)
ANION GAP SERPL CALCULATED.3IONS-SCNC: 11 MMOL/L (ref 7–15)
AST SERPL W P-5'-P-CCNC: 20 U/L (ref 0–45)
BILIRUB SERPL-MCNC: 0.4 MG/DL
BUN SERPL-MCNC: 16 MG/DL (ref 8–23)
CALCIUM SERPL-MCNC: 9.4 MG/DL (ref 8.8–10.2)
CHLORIDE SERPL-SCNC: 102 MMOL/L (ref 98–107)
CHOLEST SERPL-MCNC: 196 MG/DL
CREAT SERPL-MCNC: 0.55 MG/DL (ref 0.51–0.95)
DEPRECATED HCO3 PLAS-SCNC: 28 MMOL/L (ref 22–29)
GFR SERPL CREATININE-BSD FRML MDRD: >90 ML/MIN/1.73M2
GLUCOSE SERPL-MCNC: 115 MG/DL (ref 70–99)
HDLC SERPL-MCNC: 40 MG/DL
LDLC SERPL CALC-MCNC: 110 MG/DL
NONHDLC SERPL-MCNC: 156 MG/DL
POTASSIUM SERPL-SCNC: 4.2 MMOL/L (ref 3.4–5.3)
PROT SERPL-MCNC: 6.4 G/DL (ref 6.4–8.3)
SODIUM SERPL-SCNC: 141 MMOL/L (ref 136–145)
TRIGL SERPL-MCNC: 231 MG/DL

## 2023-07-07 PROCEDURE — 80061 LIPID PANEL: CPT | Mod: ORL | Performed by: STUDENT IN AN ORGANIZED HEALTH CARE EDUCATION/TRAINING PROGRAM

## 2023-07-07 PROCEDURE — 80053 COMPREHEN METABOLIC PANEL: CPT | Mod: ORL | Performed by: STUDENT IN AN ORGANIZED HEALTH CARE EDUCATION/TRAINING PROGRAM

## 2023-09-06 ENCOUNTER — ANCILLARY PROCEDURE (OUTPATIENT)
Dept: MAMMOGRAPHY | Facility: CLINIC | Age: 78
End: 2023-09-06
Attending: INTERNAL MEDICINE
Payer: MEDICARE

## 2023-09-06 DIAGNOSIS — Z12.31 VISIT FOR SCREENING MAMMOGRAM: ICD-10-CM

## 2023-09-06 PROCEDURE — 77067 SCR MAMMO BI INCL CAD: CPT | Mod: 52

## 2024-01-05 ENCOUNTER — LAB REQUISITION (OUTPATIENT)
Dept: LAB | Facility: CLINIC | Age: 79
End: 2024-01-05
Payer: MEDICARE

## 2024-01-05 DIAGNOSIS — E78.5 HYPERLIPIDEMIA, UNSPECIFIED: ICD-10-CM

## 2024-01-05 DIAGNOSIS — E11.9 TYPE 2 DIABETES MELLITUS WITHOUT COMPLICATIONS (H): ICD-10-CM

## 2024-01-05 LAB
ALBUMIN SERPL BCG-MCNC: 4.4 G/DL (ref 3.5–5.2)
ALP SERPL-CCNC: 84 U/L (ref 40–150)
ALT SERPL W P-5'-P-CCNC: 25 U/L (ref 0–50)
ANION GAP SERPL CALCULATED.3IONS-SCNC: 10 MMOL/L (ref 7–15)
AST SERPL W P-5'-P-CCNC: 24 U/L (ref 0–45)
BILIRUB SERPL-MCNC: 0.4 MG/DL
BUN SERPL-MCNC: 16.9 MG/DL (ref 8–23)
CALCIUM SERPL-MCNC: 9.1 MG/DL (ref 8.8–10.2)
CHLORIDE SERPL-SCNC: 102 MMOL/L (ref 98–107)
CHOLEST SERPL-MCNC: 153 MG/DL
CREAT SERPL-MCNC: 0.63 MG/DL (ref 0.51–0.95)
DEPRECATED HCO3 PLAS-SCNC: 29 MMOL/L (ref 22–29)
EGFRCR SERPLBLD CKD-EPI 2021: 90 ML/MIN/1.73M2
FASTING STATUS PATIENT QL REPORTED: ABNORMAL
GLUCOSE SERPL-MCNC: 141 MG/DL (ref 70–99)
HBA1C MFR BLD: 6.6 %
HDLC SERPL-MCNC: 37 MG/DL
LDLC SERPL CALC-MCNC: 76 MG/DL
NONHDLC SERPL-MCNC: 116 MG/DL
POTASSIUM SERPL-SCNC: 4.3 MMOL/L (ref 3.4–5.3)
PROT SERPL-MCNC: 6.7 G/DL (ref 6.4–8.3)
SODIUM SERPL-SCNC: 141 MMOL/L (ref 135–145)
TRIGL SERPL-MCNC: 198 MG/DL

## 2024-01-05 PROCEDURE — 80061 LIPID PANEL: CPT | Mod: ORL | Performed by: STUDENT IN AN ORGANIZED HEALTH CARE EDUCATION/TRAINING PROGRAM

## 2024-01-05 PROCEDURE — 83036 HEMOGLOBIN GLYCOSYLATED A1C: CPT | Mod: ORL | Performed by: STUDENT IN AN ORGANIZED HEALTH CARE EDUCATION/TRAINING PROGRAM

## 2024-01-05 PROCEDURE — 80053 COMPREHEN METABOLIC PANEL: CPT | Mod: ORL | Performed by: STUDENT IN AN ORGANIZED HEALTH CARE EDUCATION/TRAINING PROGRAM

## 2024-01-08 DIAGNOSIS — N95.1 MENOPAUSAL SYNDROME (HOT FLASHES): ICD-10-CM

## 2024-01-08 RX ORDER — VENLAFAXINE HYDROCHLORIDE 37.5 MG/1
37.5 CAPSULE, EXTENDED RELEASE ORAL DAILY
Qty: 90 CAPSULE | Refills: 3 | Status: SHIPPED | OUTPATIENT
Start: 2024-01-08

## 2024-01-08 NOTE — TELEPHONE ENCOUNTER
Pending Prescriptions:                       Disp   Refills    venlafaxine (EFFEXOR XR) 37.5 MG 24 hr ca*90 cap*3            Sig: Take 1 capsule (37.5 mg) by mouth daily    Fax received from [facility] stating that patient is requesting prescription sent to their pharmacy mail delivery service.     Last Written Prescription Date: 03/27/23  Last Fill Quantity: 90,   # refills: 3  Last Office Visit: 01/11/23  Future Office visit: 04/02/24     Routing refill request to provider for review/approval.    Gaby Jiménez RN on 1/8/2024 at 12:49 PM

## 2024-01-08 NOTE — TELEPHONE ENCOUNTER
Signed Prescriptions:                        Disp   Refills    venlafaxine (EFFEXOR XR) 37.5 MG 24 hr cap*90 cap*3        Sig: Take 1 capsule (37.5 mg) by mouth daily  Authorizing Provider: MALLORY PHILIP RN on 1/8/2024 at 2:38 PM

## 2024-01-12 ENCOUNTER — LAB REQUISITION (OUTPATIENT)
Dept: LAB | Facility: CLINIC | Age: 79
End: 2024-01-12
Payer: MEDICARE

## 2024-01-12 DIAGNOSIS — E11.9 TYPE 2 DIABETES MELLITUS WITHOUT COMPLICATIONS (H): ICD-10-CM

## 2024-01-12 PROCEDURE — 82043 UR ALBUMIN QUANTITATIVE: CPT | Mod: ORL | Performed by: STUDENT IN AN ORGANIZED HEALTH CARE EDUCATION/TRAINING PROGRAM

## 2024-01-13 LAB
CREAT UR-MCNC: 28.8 MG/DL
MICROALBUMIN UR-MCNC: <12 MG/L
MICROALBUMIN/CREAT UR: NORMAL MG/G{CREAT}

## 2024-01-19 ENCOUNTER — HOSPITAL ENCOUNTER (OUTPATIENT)
Dept: CT IMAGING | Facility: HOSPITAL | Age: 79
Discharge: HOME | End: 2024-01-19
Attending: INTERNAL MEDICINE
Payer: MEDICARE

## 2024-01-19 ENCOUNTER — HOSPITAL ENCOUNTER (OUTPATIENT)
Dept: CARDIOLOGY | Facility: HOSPITAL | Age: 79
Discharge: HOME | End: 2024-01-19
Attending: INTERNAL MEDICINE
Payer: MEDICARE

## 2024-01-19 DIAGNOSIS — I10 BENIGN ESSENTIAL HYPERTENSION: ICD-10-CM

## 2024-01-19 DIAGNOSIS — R14.0 ABDOMINAL DISTENSION: ICD-10-CM

## 2024-01-19 DIAGNOSIS — K59.04 CHRONIC IDIOPATHIC CONSTIPATION: ICD-10-CM

## 2024-01-19 LAB
LVEF ECHO: NORMAL
RADIOLOGIST FLAGS: ABNORMAL

## 2024-01-19 PROCEDURE — 250N000011 HC RX IP 250 OP 636

## 2024-01-19 PROCEDURE — 93306 TTE W/DOPPLER COMPLETE: CPT | Mod: 26 | Performed by: INTERNAL MEDICINE

## 2024-01-19 PROCEDURE — 93306 TTE W/DOPPLER COMPLETE: CPT

## 2024-01-19 PROCEDURE — 74177 CT ABD & PELVIS W/CONTRAST: CPT

## 2024-01-19 RX ORDER — IOPAMIDOL 755 MG/ML
77 INJECTION, SOLUTION INTRAVASCULAR ONCE
Status: COMPLETED | OUTPATIENT
Start: 2024-01-19 | End: 2024-01-19

## 2024-01-19 RX ADMIN — IOPAMIDOL 77 ML: 755 INJECTION, SOLUTION INTRAVENOUS at 10:11

## 2024-01-26 DIAGNOSIS — R93.1 ABNORMAL ECHOCARDIOGRAM: Primary | ICD-10-CM

## 2024-02-01 ENCOUNTER — OFFICE VISIT (OUTPATIENT)
Dept: CARDIOLOGY | Facility: CLINIC | Age: 79
End: 2024-02-01
Payer: MEDICARE

## 2024-02-01 VITALS
SYSTOLIC BLOOD PRESSURE: 136 MMHG | OXYGEN SATURATION: 95 % | BODY MASS INDEX: 27.52 KG/M2 | RESPIRATION RATE: 16 BRPM | WEIGHT: 161.2 LBS | DIASTOLIC BLOOD PRESSURE: 69 MMHG | HEIGHT: 64 IN | HEART RATE: 83 BPM | TEMPERATURE: 97.9 F

## 2024-02-01 DIAGNOSIS — R06.09 DOE (DYSPNEA ON EXERTION): Primary | ICD-10-CM

## 2024-02-01 DIAGNOSIS — I47.10 SVT (SUPRAVENTRICULAR TACHYCARDIA) (H): ICD-10-CM

## 2024-02-01 PROBLEM — I97.89: Status: ACTIVE | Noted: 2022-11-29

## 2024-02-01 PROBLEM — I51.89: Status: ACTIVE | Noted: 2022-11-29

## 2024-02-01 PROCEDURE — 83880 ASSAY OF NATRIURETIC PEPTIDE: CPT | Performed by: INTERNAL MEDICINE

## 2024-02-01 PROCEDURE — 99214 OFFICE O/P EST MOD 30 MIN: CPT | Performed by: INTERNAL MEDICINE

## 2024-02-01 PROCEDURE — 36415 COLL VENOUS BLD VENIPUNCTURE: CPT | Performed by: INTERNAL MEDICINE

## 2024-02-01 RX ORDER — LOSARTAN POTASSIUM 100 MG/1
TABLET ORAL
COMMUNITY

## 2024-02-01 RX ORDER — BACILLUS COAGULANS/INULIN 1B-250 MG
CAPSULE ORAL
COMMUNITY
End: 2024-02-01

## 2024-02-01 RX ORDER — FLUCONAZOLE 150 MG/1
TABLET ORAL
Status: ON HOLD | COMMUNITY
Start: 2023-05-08 | End: 2024-03-22

## 2024-02-01 RX ORDER — METOPROLOL SUCCINATE 25 MG/1
12.5 TABLET, EXTENDED RELEASE ORAL DAILY
Qty: 60 TABLET | Refills: 3 | Status: SHIPPED | OUTPATIENT
Start: 2024-02-01 | End: 2024-06-19

## 2024-02-01 NOTE — LETTER
2/1/2024    Barber Valladares MD  [facility] [address]  [place] [address]    RE: Neelima Zuniga       Dear Colleague,     I had the pleasure of seeing Neelima Zuniga in the [facility].      [facility]  [phone]          Assessment/Recommendations   Patient with known history of SVT which she has been on longstanding digoxin for.  She also has a history of dynamic left ventricular outflow tract obstruction which resolved after discontinuing her hydrochlorothiazide however, has returned recent echocardiogram and associated with shortness of breath with activity.  The shortness of breath is worse over the last couple of months.    She also has an element of aortic stenosis on previous echocardiograms and somewhat difficult to evaluate her aortic valve on the most recent echocardiogram given the dynamic left ventricular outflow tract obstruction.  I have reviewed the echocardiogram and there is also systolic anterior motion of the mitral valve leaflets.    Digoxin would be relatively contraindicated in this situation and would like to get her off of that at some point.  Will try her on low-dose metoprolol, 12.5 mg each day.  She had some nausea with beta-blockers in the past but she is willing to try it again and see if she has any symptoms.    We have scheduled her for a cardiac MRI this month and this will help us see the aortic valve as well as the dynamic left ventricular outflow tract obstruction left ventricular wall thickness and myocardium.  Will call her with these results as well.    Will check a B nitric peptide today as well.    Thank you for allowing us participate in her care.       History of Present Illness/Subjective    Ms. Neelima Zuniga is a 78 year old female with known history of dynamic outflow tract obstruction which resolved with discontinuation of diuretic therapy but has had recurrence of this recent  "echocardiogram.  She also had moderate aortic stenosis in the past and recent echocardiogram was unable to evaluate the aortic valve well because of the dynamic outflow tract obstruction poor visualization of the aortic valve.    She has noted more shortness of breath with activity over the last 2 to 3 months.  She went trip to [facility] and walking around [place] made her short of breath.  She had to keep up with the group that she was traveling with.  She denies orthopnea, paroxysmal nocturnal dyspnea, and has had some mild peripheral edema which support stockings have helped.  She has not had any syncopal or near syncopal episodes and has occasional palpitations but the digoxin is not a good job over the years in controlling her SVT.    She denies any chest discomfort.  She does wear his CPAP mask religiously.    ECG: Personally reviewed.        Physical Examination Review of Systems   /69 (BP Location: Left arm, Patient Position: Sitting, Cuff Size: Adult Regular)   Pulse 83   Temp 97.9  F (36.6  C) (Oral)   Resp 16   Ht 1.626 m (5' 4\")   Wt 73.1 kg (161 lb 3.2 oz)   SpO2 95%   BMI 27.67 kg/m    Body mass index is 27.67 kg/m .  Wt Readings from Last 3 Encounters:   02/01/24 73.1 kg (161 lb 3.2 oz)   01/27/23 71.2 kg (157 lb)   01/11/23 70.7 kg (155 lb 12.8 oz)     General Appearance:   Alert, cooperative and in no acute distress.   ENT/Mouth: Pink/moist oral mucosa   EYES:  no scleral icterus, normal conjunctivae   Neck: JVP normal. No Hepatojugular reflux. Thyroid not visualized.   Chest/Lungs:   Lungs are clear to auscultation, equal chest wall expansion.  Slight diminished breath sounds at the bases.   Cardiovascular:   S1, S2 with 3/6 systolic murmur , no clicks or rubs. Brachial, radial pulses are intact and symetric.  Murmur transmitted to the carotids.   Abdomen:  Nontender.    Extremities: No cyanosis, clubbing and trace pretibial edema   Skin: no xanthelasma, warm.    Neurologic: " "normal arm movement bilateral, no tremors     Psychiatric: Appropriate affect.      Enc Vitals  BP: 136/69  Pulse: 83  Resp: 16  Temp: 97.9  F (36.6  C)  Temp src: Oral  SpO2: 95 %  Weight: 73.1 kg (161 lb 3.2 oz)  Height: 162.6 cm (5' 4\")                                           Medical History  Surgical History Family History Social History   Past Medical History:   Diagnosis Date    Amblyopia     left eye    Anxiety     Breast cancer (H) 2007    Cancer (H) 2009    right breast    Hyperlipidemia     Osteopenia     Palpitations     Reflux esophagitis     Sleep apnea     CPAP    Uterine fibroid     Past Surgical History:   Procedure Laterality Date    BIOPSY BREAST Right 2007    BREAST SURGERY Right     reconstruction    BREAST SURGERY Left     augmentation    CATARACT IOL, RT/LT       SECTION      EXAM UNDER ANESTHESIA RECTUM N/A 10/28/2022    Procedure: RECTAL EXAM UNDER ANESTHESIA;  Surgeon: Priscilla Orr MD;  [facility]    EXAM UNDER ANESTHESIA, INJECT BOTOX N/A 10/28/2022    Procedure: BOTOX INJECTION, EXCISION PAPILLA;  Surgeon: Priscilla Orr MD;  Location: [facility]    HYSTERECTOMY  2015    MAMMOPLASTY AUGMENTATION Bilateral     MASTECTOMY Right 2007    OOPHORECTOMY Bilateral 2015    SPHINCTEROTOMY RECTUM N/A 10/28/2022    Procedure: POSSIBLE SPHINCTEROTOMY;  Surgeon: Priscilla Orr MD;  Location: [facility] OR    Family History   Problem Relation Age of Onset    Macular Degeneration Father     Glaucoma Father     Glaucoma Paternal Grandmother     Diabetes Mother     Hypertension Mother     Hypertension Father     Hyperlipidemia Father     Social History     Socioeconomic History    Marital status:      Spouse name: Not on file    Number of children: Not on file    Years of education: Not on file    Highest education level: Not on file   Occupational History    Not on file   Tobacco Use    Smoking status: Never    Smokeless tobacco: Never   Vaping Use    " Vaping Use: Never used   Substance and Sexual Activity    Alcohol use: Yes    Drug use: No    Sexual activity: Yes     Partners: Male   Other Topics Concern    Parent/sibling w/ CABG, MI or angioplasty before 65F 55M? Not Asked   Social History Narrative    Not on file     Social Determinants of Health     Financial Resource Strain: Not on file   Food Insecurity: Not on file   Transportation Needs: Not on file   Physical Activity: Not on file   Stress: Not on file   Social Connections: Not on file   Interpersonal Safety: Not At Risk (1/11/2023)    Humiliation, Afraid, Rape, and Kick questionnaire     Fear of Current or Ex-Partner: No     Emotionally Abused: No     Physically Abused: No     Sexually Abused: No   Housing Stability: Not on file          Medications  Allergies   Current Outpatient Medications   Medication Sig Dispense Refill    calcium-vitamin D (CALTRATE) 600-400 MG-UNIT per tablet Take 1 tablet by mouth 2 times daily      digoxin (LANOXIN) 250 MCG tablet Take 1 tablet (0.25 mg) by mouth daily 90 tablet 3    eszopiclone (LUNESTA) 2 MG tablet Take 2 mg by mouth nightly as needed for sleep      fluconazole (DIFLUCAN) 150 MG tablet 1 tablet, then take 2nd tan 72 hrs later Orally      Glucosamine Sulfate 1500 MG PACK Take 1,500 mg by mouth daily      Lactobacillus Rhamnosus, GG, ( PROBIOTIC DIGESTIVE CARE) CAPS Take 1 capsule by mouth daily      losartan (COZAAR) 100 MG tablet TAKE 1 TABLET BY MOUTH EVERY DAY for 90 days      MAGNESIUM OXIDE PO Take 400 mg by mouth daily      metoprolol succinate ER (TOPROL XL) 25 MG 24 hr tablet Take 0.5 tablets (12.5 mg) by mouth daily 60 tablet 3    MULTIPLE VITAMIN PO daily      omeprazole (PRILOSEC) 40 MG DR capsule Take 40 mg by mouth every morning      polyethylene glycol (MIRALAX/GLYCOLAX) packet Take 1 packet by mouth daily      ROSUVASTATIN CALCIUM PO Take 10 mg by mouth daily      saline 0.65 % SOLN Spray 1 spray in nostril      venlafaxine (EFFEXOR XR) 37.5  MG 24 hr capsule Take 1 capsule (37.5 mg) by mouth daily 90 capsule 3    Allergies   Allergen Reactions    Erythromycin     Metoprolol Nausea    Seasonal Allergies      Other Reaction(s): Unknown    Sulfamethoxazole-Trimethoprim Hives    Sulfa Antibiotics Rash         Lab Results    Chemistry/lipid CBC Cardiac Enzymes/BNP/TSH/INR   Lab Results   Component Value Date    CHOL 153 01/05/2024    HDL 37 (L) 01/05/2024    TRIG 198 (H) 01/05/2024    BUN 16.9 01/05/2024     01/05/2024    CO2 29 01/05/2024    Lab Results   Component Value Date    WBC 7.3 12/21/2022    HGB 15.0 12/21/2022    HCT 46.8 12/21/2022    MCV 91 12/21/2022     12/21/2022    Lab Results   Component Value Date    TSH 1.88 12/21/2022                 Thank you for allowing me to participate in the care of your patient.      Sincerely,     Jose Zuniga MD     [facility]  cc:   Jose Zuniga MD  [address]  [place] [address]

## 2024-02-01 NOTE — PROGRESS NOTES
[facility]  [phone]          Assessment/Recommendations   Patient with known history of SVT which she has been on longstanding digoxin for.  She also has a history of dynamic left ventricular outflow tract obstruction which resolved after discontinuing her hydrochlorothiazide however, has returned recent echocardiogram and associated with shortness of breath with activity.  The shortness of breath is worse over the last couple of months.    She also has an element of aortic stenosis on previous echocardiograms and somewhat difficult to evaluate her aortic valve on the most recent echocardiogram given the dynamic left ventricular outflow tract obstruction.  I have reviewed the echocardiogram and there is also systolic anterior motion of the mitral valve leaflets.    Digoxin would be relatively contraindicated in this situation and would like to get her off of that at some point.  Will try her on low-dose metoprolol, 12.5 mg each day.  She had some nausea with beta-blockers in the past but she is willing to try it again and see if she has any symptoms.    We have scheduled her for a cardiac MRI this month and this will help us see the aortic valve as well as the dynamic left ventricular outflow tract obstruction left ventricular wall thickness and myocardium.  Will call her with these results as well.    Will check a B nitric peptide today as well.    Thank you for allowing us participate in her care.       History of Present Illness/Subjective    Ms. Neelima Zuniga is a 78 year old female with known history of dynamic outflow tract obstruction which resolved with discontinuation of diuretic therapy but has had recurrence of this recent echocardiogram.  She also had moderate aortic stenosis in the past and recent echocardiogram was unable to evaluate the aortic valve well because of the dynamic outflow tract obstruction poor visualization of the aortic valve.    She has noted more  "shortness of breath with activity over the last 2 to 3 months.  She went trip to [facility] and walking around [place] made her short of breath.  She had to keep up with the group that she was traveling with.  She denies orthopnea, paroxysmal nocturnal dyspnea, and has had some mild peripheral edema which support stockings have helped.  She has not had any syncopal or near syncopal episodes and has occasional palpitations but the digoxin is not a good job over the years in controlling her SVT.    She denies any chest discomfort.  She does wear his CPAP mask religiously.    ECG: Personally reviewed.        Physical Examination Review of Systems   /69 (BP Location: Left arm, Patient Position: Sitting, Cuff Size: Adult Regular)   Pulse 83   Temp 97.9  F (36.6  C) (Oral)   Resp 16   Ht 1.626 m (5' 4\")   Wt 73.1 kg (161 lb 3.2 oz)   SpO2 95%   BMI 27.67 kg/m    Body mass index is 27.67 kg/m .  Wt Readings from Last 3 Encounters:   02/01/24 73.1 kg (161 lb 3.2 oz)   01/27/23 71.2 kg (157 lb)   01/11/23 70.7 kg (155 lb 12.8 oz)     General Appearance:   Alert, cooperative and in no acute distress.   ENT/Mouth: Pink/moist oral mucosa   EYES:  no scleral icterus, normal conjunctivae   Neck: JVP normal. No Hepatojugular reflux. Thyroid not visualized.   Chest/Lungs:   Lungs are clear to auscultation, equal chest wall expansion.  Slight diminished breath sounds at the bases.   Cardiovascular:   S1, S2 with 3/6 systolic murmur , no clicks or rubs. Brachial, radial pulses are intact and symetric.  Murmur transmitted to the carotids.   Abdomen:  Nontender.    Extremities: No cyanosis, clubbing and trace pretibial edema   Skin: no xanthelasma, warm.    Neurologic: normal arm movement bilateral, no tremors     Psychiatric: Appropriate affect.      Enc Vitals  BP: 136/69  Pulse: 83  Resp: 16  Temp: 97.9  F (36.6  C)  Temp src: Oral  SpO2: 95 %  Weight: 73.1 kg (161 lb 3.2 oz)  Height: 162.6 cm (5' 4\")               "                             Medical History  Surgical History Family History Social History   Past Medical History:   Diagnosis Date    Amblyopia     left eye    Anxiety     Breast cancer (H) 2007    Cancer (H) 2009    right breast    Hyperlipidemia     Osteopenia     Palpitations     Reflux esophagitis     Sleep apnea     CPAP    Uterine fibroid     Past Surgical History:   Procedure Laterality Date    BIOPSY BREAST Right 2007    BREAST SURGERY Right     reconstruction    BREAST SURGERY Left     augmentation    CATARACT IOL, RT/LT       SECTION      EXAM UNDER ANESTHESIA RECTUM N/A 10/28/2022    Procedure: RECTAL EXAM UNDER ANESTHESIA;  Surgeon: Priscilla Orr MD;  Location: [facility]    EXAM UNDER ANESTHESIA, INJECT BOTOX N/A 10/28/2022    Procedure: BOTOX INJECTION, EXCISION PAPILLA;  Surgeon: Priscilla Orr MD;  Location: [facility] OR    HYSTERECTOMY  2015    MAMMOPLASTY AUGMENTATION Bilateral     MASTECTOMY Right 2007    OOPHORECTOMY Bilateral 2015    SPHINCTEROTOMY RECTUM N/A 10/28/2022    Procedure: POSSIBLE SPHINCTEROTOMY;  Surgeon: Priscilla Orr MD;  Location: [facility]    Family History   Problem Relation Age of Onset    Macular Degeneration Father     Glaucoma Father     Glaucoma Paternal Grandmother     Diabetes Mother     Hypertension Mother     Hypertension Father     Hyperlipidemia Father     Social History     Socioeconomic History    Marital status:      Spouse name: Not on file    Number of children: Not on file    Years of education: Not on file    Highest education level: Not on file   Occupational History    Not on file   Tobacco Use    Smoking status: Never    Smokeless tobacco: Never   Vaping Use    Vaping Use: Never used   Substance and Sexual Activity    Alcohol use: Yes    Drug use: No    Sexual activity: Yes     Partners: Male   Other Topics Concern    Parent/sibling w/ CABG, MI or angioplasty before 65F 55M? Not Asked   Social History Narrative     Not on file     Social Determinants of Health     Financial Resource Strain: Not on file   Food Insecurity: Not on file   Transportation Needs: Not on file   Physical Activity: Not on file   Stress: Not on file   Social Connections: Not on file   Interpersonal Safety: Not At Risk (1/11/2023)    Humiliation, Afraid, Rape, and Kick questionnaire     Fear of Current or Ex-Partner: No     Emotionally Abused: No     Physically Abused: No     Sexually Abused: No   Housing Stability: Not on file          Medications  Allergies   Current Outpatient Medications   Medication Sig Dispense Refill    calcium-vitamin D (CALTRATE) 600-400 MG-UNIT per tablet Take 1 tablet by mouth 2 times daily      digoxin (LANOXIN) 250 MCG tablet Take 1 tablet (0.25 mg) by mouth daily 90 tablet 3    eszopiclone (LUNESTA) 2 MG tablet Take 2 mg by mouth nightly as needed for sleep      fluconazole (DIFLUCAN) 150 MG tablet 1 tablet, then take 2nd tan 72 hrs later Orally      Glucosamine Sulfate 1500 MG PACK Take 1,500 mg by mouth daily      Lactobacillus Rhamnosus, GG, ( PROBIOTIC DIGESTIVE CARE) CAPS Take 1 capsule by mouth daily      losartan (COZAAR) 100 MG tablet TAKE 1 TABLET BY MOUTH EVERY DAY for 90 days      MAGNESIUM OXIDE PO Take 400 mg by mouth daily      metoprolol succinate ER (TOPROL XL) 25 MG 24 hr tablet Take 0.5 tablets (12.5 mg) by mouth daily 60 tablet 3    MULTIPLE VITAMIN PO daily      omeprazole (PRILOSEC) 40 MG DR capsule Take 40 mg by mouth every morning      polyethylene glycol (MIRALAX/GLYCOLAX) packet Take 1 packet by mouth daily      ROSUVASTATIN CALCIUM PO Take 10 mg by mouth daily      saline 0.65 % SOLN Spray 1 spray in nostril      venlafaxine (EFFEXOR XR) 37.5 MG 24 hr capsule Take 1 capsule (37.5 mg) by mouth daily 90 capsule 3    Allergies   Allergen Reactions    Erythromycin     Metoprolol Nausea    Seasonal Allergies      Other Reaction(s): Unknown    Sulfamethoxazole-Trimethoprim Hives    Sulfa Antibiotics  Rash         Lab Results    Chemistry/lipid CBC Cardiac Enzymes/BNP/TSH/INR   Lab Results   Component Value Date    CHOL 153 01/05/2024    HDL 37 (L) 01/05/2024    TRIG 198 (H) 01/05/2024    BUN 16.9 01/05/2024     01/05/2024    CO2 29 01/05/2024    Lab Results   Component Value Date    WBC 7.3 12/21/2022    HGB 15.0 12/21/2022    HCT 46.8 12/21/2022    MCV 91 12/21/2022     12/21/2022    Lab Results   Component Value Date    TSH 1.88 12/21/2022

## 2024-02-02 LAB — NT-PROBNP SERPL-MCNC: 150 PG/ML (ref 0–1800)

## 2024-02-06 ENCOUNTER — TELEPHONE (OUTPATIENT)
Dept: CARDIOLOGY | Facility: CLINIC | Age: 79
End: 2024-02-06
Payer: MEDICARE

## 2024-02-06 NOTE — TELEPHONE ENCOUNTER
Pt called in, confirmed pharmacy. Verbal orders called into pharmacy.    Left detailed message for Pt, writer to confirm which pharmacy[facility]        From: Jose Zuniga MD  Sent: 2/6/2024  11:10 AM CST  To: Magalys Hendricks    5 mg Valium okay for one-time use.

## 2024-02-06 NOTE — TELEPHONE ENCOUNTER
Pt called in, requesting valium for upcoming cardiac mri. Pt understands will need  if approved, will discuss with Dr. Zuniga.

## 2024-02-12 NOTE — PROGRESS NOTES
"[facility]  [phone]          Assessment/Recommendations   Patient with known hyperlipidemia as well as SVT which has been successfully treated with digoxin for several years.  We will renew her digoxin.  She lost her  this past year and has had some chest tightness which I suspect is more stress but I certainly cannot exclude cardiac etiology so we will get a stress echocardiogram.    I have encouraged her to maintain an active lifestyle and to call us if she has new symptoms and we will certainly call her with results of the stress echocardiogram and any further recommendations.    Thank you for allowing us to participate in her care.       History of Present Illness/Subjective    Ms. Neelima Zuniga is a 76 year old female with known hyperlipidemia as well as sleep apnea and a history of supraventricular tachycardia.  The SVT has been well treated with digoxin for several years.  She has felt well from a palpitation standpoint this past year but she lost her  and she has noticed some intermittent chest tightness which will come on usually when she is at home and lasts somewhere between 2 and 5 minutes.  It then abates on its own.  It is not associated with diaphoresis, nausea, or shortness of breath.  She wonders if it is stress related.  She does not seem to get it when she is at the [facility] doing her workouts.    She may have some mild increase shortness of breath with activity but not dramatic.  She denies orthopnea or paroxysmal nocturnal dyspnea but uses her CPAP mask religiously.  Echocardiogram last year showed normal left ventricular systolic function.  Normal right ventricular size and systolic function.       Physical Examination Review of Systems   /64 (BP Location: Left arm, Patient Position: Sitting, Cuff Size: Adult Regular)   Pulse 92   Resp 20   Ht 1.626 m (5' 4\")   Wt 69.7 kg (153 lb 9.6 oz)   BMI 26.37 kg/m    Body mass index is 26.37 " [FreeTextEntry1] : Pt. with above medical issues Covid is mostly resolved but now has headache and diplopia -  Concern is for intracranial event To go to ER now. "kg/m .  Wt Readings from Last 3 Encounters:   22 69.7 kg (153 lb 9.6 oz)   22 70.3 kg (155 lb)   21 66.4 kg (146 lb 4.8 oz)     General Appearance:   Alert, cooperative and in no acute distress.   ENT/Mouth: Patient wearing a mask.      EYES:  no scleral icterus, normal conjunctivae   Neck: JVP normal. No Hepatojugular reflux. Thyroid not visualized.   Chest/Lungs:   Lungs are clear to auscultation, equal chest wall expansion.   Cardiovascular:   S1, S2 with 2/6 systolic murmur , no clicks or rubs. Brachial, radial  pulses are intact and symetric. No carotid bruits noted   Abdomen:  Nontender. BS+.    Extremities: No cyanosis, clubbing  and mild ankle edema   Skin: no xanthelasma, warm.    Neurologic: normal arm movement bilateral, no tremors     Psychiatric: Appropriate affect.      Enc Vitals  BP: 130/64  Pulse: 92  Resp: 20  Weight: 69.7 kg (153 lb 9.6 oz)  Height: 162.6 cm (5' 4\")                                           Medical History  Surgical History Family History Social History   Past Medical History:   Diagnosis Date     Amblyopia     left eye     Anxiety      Breast cancer (H) 2007     Cancer (H) 2009    right breast     Hyperlipidemia      Osteopenia      Palpitations      Reflux esophagitis      Sleep apnea     CPAP     Uterine fibroid     Past Surgical History:   Procedure Laterality Date     BIOPSY BREAST Right 2007     BREAST SURGERY Right     reconstruction     BREAST SURGERY Left     augmentation     CATARACT IOL, RT/LT        SECTION       HYSTERECTOMY  2015     MAMMOPLASTY AUGMENTATION Bilateral      MASTECTOMY Right 2007     OOPHORECTOMY Bilateral 2015    Family History   Problem Relation Age of Onset     Macular Degeneration Father      Glaucoma Father      Glaucoma Paternal Grandmother      Diabetes Mother      Hypertension Mother      Hypertension Father      Hyperlipidemia Father     Social History     Socioeconomic History     Marital status:      Spouse " name: Not on file     Number of children: Not on file     Years of education: Not on file     Highest education level: Not on file   Occupational History     Not on file   Tobacco Use     Smoking status: Never Smoker     Smokeless tobacco: Never Used   Substance and Sexual Activity     Alcohol use: Yes     Drug use: No     Sexual activity: Yes     Partners: Male   Other Topics Concern     Parent/sibling w/ CABG, MI or angioplasty before 65F 55M? Not Asked   Social History Narrative     Not on file     Social Determinants of Health     Financial Resource Strain: Not on file   Food Insecurity: Not on file   Transportation Needs: Not on file   Physical Activity: Not on file   Stress: Not on file   Social Connections: Not on file   Intimate Partner Violence: Not At Risk     Fear of Current or Ex-Partner: No     Emotionally Abused: No     Physically Abused: No     Sexually Abused: No   Housing Stability: Not on file          Medications  Allergies   Current Outpatient Medications   Medication Sig Dispense Refill     calcium-vitamin D (CALTRATE) 600-400 MG-UNIT per tablet Take 1 tablet by mouth 2 times daily       digoxin (LANOXIN) 250 MCG tablet Take 1 tablet (0.25 mg) by mouth daily 90 tablet 3     ESZOPICLONE PO Take 2 mg by mouth nightly as needed for sleep       GLUCOSAMINE SULFATE PO Take 1,500 mg by mouth       MAGNESIUM OXIDE PO        OMEPRAZOLE PO Take 40 mg by mouth every morning       polyethylene glycol (MIRALAX/GLYCOLAX) packet Take 1 packet by mouth daily       ROSUVASTATIN CALCIUM PO Take 20 mg by mouth       venlafaxine (EFFEXOR-XR) 37.5 MG 24 hr capsule Take 1 capsule (37.5 mg) by mouth daily 90 capsule 3     Lactobacillus Rhamnosus, GG, (RA PROBIOTIC DIGESTIVE CARE) CAPS Take 1 capsule by mouth daily      Allergies   Allergen Reactions     Erythromycin      Metoprolol Nausea     Sulfa Drugs Rash         Lab Results    Chemistry/lipid CBC Cardiac Enzymes/BNP/TSH/INR   Lab Results   Component Value Date     CHOL 190 10/19/2021    HDL 40 (L) 10/19/2021    TRIG 235 (H) 10/19/2021    BUN 21 10/19/2021     10/19/2021    CO2 30 10/19/2021    Lab Results   Component Value Date    WBC 5.9 10/19/2021    HGB 14.7 10/19/2021    HCT 44.2 10/19/2021    MCV 92 10/19/2021     10/19/2021    Lab Results   Component Value Date    TSH 1.76 10/19/2021

## 2024-03-01 ENCOUNTER — LAB REQUISITION (OUTPATIENT)
Dept: LAB | Facility: CLINIC | Age: 79
End: 2024-03-01
Payer: MEDICARE

## 2024-03-01 DIAGNOSIS — Z01.818 ENCOUNTER FOR OTHER PREPROCEDURAL EXAMINATION: ICD-10-CM

## 2024-03-01 PROCEDURE — 80048 BASIC METABOLIC PNL TOTAL CA: CPT | Mod: ORL | Performed by: STUDENT IN AN ORGANIZED HEALTH CARE EDUCATION/TRAINING PROGRAM

## 2024-03-02 LAB
ANION GAP SERPL CALCULATED.3IONS-SCNC: 12 MMOL/L (ref 7–15)
BUN SERPL-MCNC: 22.1 MG/DL (ref 8–23)
CALCIUM SERPL-MCNC: 8.9 MG/DL (ref 8.8–10.2)
CHLORIDE SERPL-SCNC: 100 MMOL/L (ref 98–107)
CREAT SERPL-MCNC: 0.65 MG/DL (ref 0.51–0.95)
DEPRECATED HCO3 PLAS-SCNC: 27 MMOL/L (ref 22–29)
EGFRCR SERPLBLD CKD-EPI 2021: 89 ML/MIN/1.73M2
GLUCOSE SERPL-MCNC: 196 MG/DL (ref 70–99)
POTASSIUM SERPL-SCNC: 4.2 MMOL/L (ref 3.4–5.3)
SODIUM SERPL-SCNC: 139 MMOL/L (ref 135–145)

## 2024-03-20 ENCOUNTER — HOSPITAL ENCOUNTER (OUTPATIENT)
Dept: MRI IMAGING | Facility: HOSPITAL | Age: 79
Discharge: HOME | End: 2024-03-20
Attending: INTERNAL MEDICINE
Payer: MEDICARE

## 2024-03-20 DIAGNOSIS — R93.1 ABNORMAL ECHOCARDIOGRAM: ICD-10-CM

## 2024-03-20 PROCEDURE — 75561 CARDIAC MRI FOR MORPH W/DYE: CPT | Mod: 26 | Performed by: INTERNAL MEDICINE

## 2024-03-20 PROCEDURE — 999N000122 MR MYOCARDIUM  OVERREAD

## 2024-03-20 PROCEDURE — 255N000002 HC RX 255 OP 636: Performed by: INTERNAL MEDICINE

## 2024-03-20 PROCEDURE — A9585 GADOBUTROL INJECTION: HCPCS | Performed by: INTERNAL MEDICINE

## 2024-03-20 PROCEDURE — G1010 CDSM STANSON: HCPCS | Performed by: INTERNAL MEDICINE

## 2024-03-20 PROCEDURE — 75565 CARD MRI VELOC FLOW MAPPING: CPT | Mod: 26 | Performed by: INTERNAL MEDICINE

## 2024-03-20 PROCEDURE — 75561 CARDIAC MRI FOR MORPH W/DYE: CPT | Mod: MG

## 2024-03-20 RX ORDER — GADOBUTROL 604.72 MG/ML
13 INJECTION INTRAVENOUS ONCE
Status: COMPLETED | OUTPATIENT
Start: 2024-03-20 | End: 2024-03-20

## 2024-03-20 RX ADMIN — GADOBUTROL 13 ML: 604.72 INJECTION INTRAVENOUS at 08:04

## 2024-03-22 ENCOUNTER — HOSPITAL ENCOUNTER (OUTPATIENT)
Facility: HOSPITAL | Age: 79
Discharge: HOME | End: 2024-03-22
Attending: INTERNAL MEDICINE | Admitting: INTERNAL MEDICINE
Payer: MEDICARE

## 2024-03-22 ENCOUNTER — ANESTHESIA EVENT (OUTPATIENT)
Dept: SURGERY | Facility: HOSPITAL | Age: 79
End: 2024-03-22
Payer: MEDICARE

## 2024-03-22 ENCOUNTER — ANESTHESIA (OUTPATIENT)
Dept: SURGERY | Facility: HOSPITAL | Age: 79
End: 2024-03-22
Payer: MEDICARE

## 2024-03-22 VITALS
TEMPERATURE: 98 F | HEIGHT: 64 IN | OXYGEN SATURATION: 94 % | SYSTOLIC BLOOD PRESSURE: 114 MMHG | RESPIRATION RATE: 16 BRPM | DIASTOLIC BLOOD PRESSURE: 53 MMHG | HEART RATE: 68 BPM | BODY MASS INDEX: 27.16 KG/M2 | WEIGHT: 159.06 LBS

## 2024-03-22 LAB
GLUCOSE BLDC GLUCOMTR-MCNC: 157 MG/DL (ref 70–99)
GLUCOSE BLDC GLUCOMTR-MCNC: 161 MG/DL (ref 70–99)
UPPER EUS: NORMAL

## 2024-03-22 PROCEDURE — 272N000001 HC OR GENERAL SUPPLY STERILE: Performed by: INTERNAL MEDICINE

## 2024-03-22 PROCEDURE — 250N000009 HC RX 250: Performed by: NURSE ANESTHETIST, CERTIFIED REGISTERED

## 2024-03-22 PROCEDURE — 999N000141 HC STATISTIC PRE-PROCEDURE NURSING ASSESSMENT: Performed by: INTERNAL MEDICINE

## 2024-03-22 PROCEDURE — 360N000076 HC SURGERY LEVEL 3, PER MIN: Performed by: INTERNAL MEDICINE

## 2024-03-22 PROCEDURE — 82962 GLUCOSE BLOOD TEST: CPT

## 2024-03-22 PROCEDURE — 710N000012 HC RECOVERY PHASE 2, PER MINUTE: Performed by: INTERNAL MEDICINE

## 2024-03-22 PROCEDURE — 710N000009 HC RECOVERY PHASE 1, LEVEL 1, PER MIN: Performed by: INTERNAL MEDICINE

## 2024-03-22 PROCEDURE — 250N000011 HC RX IP 250 OP 636: Performed by: NURSE ANESTHETIST, CERTIFIED REGISTERED

## 2024-03-22 PROCEDURE — 88305 TISSUE EXAM BY PATHOLOGIST: CPT | Mod: TC | Performed by: INTERNAL MEDICINE

## 2024-03-22 PROCEDURE — 258N000003 HC RX IP 258 OP 636: Performed by: NURSE ANESTHETIST, CERTIFIED REGISTERED

## 2024-03-22 PROCEDURE — 258N000003 HC RX IP 258 OP 636: Performed by: STUDENT IN AN ORGANIZED HEALTH CARE EDUCATION/TRAINING PROGRAM

## 2024-03-22 PROCEDURE — 370N000017 HC ANESTHESIA TECHNICAL FEE, PER MIN: Performed by: INTERNAL MEDICINE

## 2024-03-22 RX ORDER — FENTANYL CITRATE 50 UG/ML
50 INJECTION, SOLUTION INTRAMUSCULAR; INTRAVENOUS EVERY 5 MIN PRN
Status: DISCONTINUED | OUTPATIENT
Start: 2024-03-22 | End: 2024-03-22 | Stop reason: HOSPADM

## 2024-03-22 RX ORDER — LIDOCAINE 40 MG/G
CREAM TOPICAL
Status: DISCONTINUED | OUTPATIENT
Start: 2024-03-22 | End: 2024-03-22 | Stop reason: HOSPADM

## 2024-03-22 RX ORDER — PROCHLORPERAZINE MALEATE 5 MG
5 TABLET ORAL EVERY 6 HOURS PRN
Status: DISCONTINUED | OUTPATIENT
Start: 2024-03-22 | End: 2024-03-22 | Stop reason: HOSPADM

## 2024-03-22 RX ORDER — LABETALOL HYDROCHLORIDE 5 MG/ML
INJECTION, SOLUTION INTRAVENOUS PRN
Status: DISCONTINUED | OUTPATIENT
Start: 2024-03-22 | End: 2024-03-22

## 2024-03-22 RX ORDER — ONDANSETRON 2 MG/ML
INJECTION INTRAMUSCULAR; INTRAVENOUS PRN
Status: DISCONTINUED | OUTPATIENT
Start: 2024-03-22 | End: 2024-03-22

## 2024-03-22 RX ORDER — FLUMAZENIL 0.1 MG/ML
0.2 INJECTION, SOLUTION INTRAVENOUS
Status: DISCONTINUED | OUTPATIENT
Start: 2024-03-22 | End: 2024-03-22 | Stop reason: HOSPADM

## 2024-03-22 RX ORDER — LIDOCAINE HYDROCHLORIDE 10 MG/ML
INJECTION, SOLUTION INFILTRATION; PERINEURAL PRN
Status: DISCONTINUED | OUTPATIENT
Start: 2024-03-22 | End: 2024-03-22

## 2024-03-22 RX ORDER — ONDANSETRON 4 MG/1
4 TABLET, ORALLY DISINTEGRATING ORAL EVERY 30 MIN PRN
Status: DISCONTINUED | OUTPATIENT
Start: 2024-03-22 | End: 2024-03-22 | Stop reason: HOSPADM

## 2024-03-22 RX ORDER — SODIUM CHLORIDE, SODIUM LACTATE, POTASSIUM CHLORIDE, CALCIUM CHLORIDE 600; 310; 30; 20 MG/100ML; MG/100ML; MG/100ML; MG/100ML
INJECTION, SOLUTION INTRAVENOUS CONTINUOUS
Status: DISCONTINUED | OUTPATIENT
Start: 2024-03-22 | End: 2024-03-22 | Stop reason: HOSPADM

## 2024-03-22 RX ORDER — ONDANSETRON 2 MG/ML
4 INJECTION INTRAMUSCULAR; INTRAVENOUS EVERY 30 MIN PRN
Status: DISCONTINUED | OUTPATIENT
Start: 2024-03-22 | End: 2024-03-22 | Stop reason: HOSPADM

## 2024-03-22 RX ORDER — PROPOFOL 10 MG/ML
INJECTION, EMULSION INTRAVENOUS PRN
Status: DISCONTINUED | OUTPATIENT
Start: 2024-03-22 | End: 2024-03-22

## 2024-03-22 RX ORDER — PROPOFOL 10 MG/ML
INJECTION, EMULSION INTRAVENOUS CONTINUOUS PRN
Status: DISCONTINUED | OUTPATIENT
Start: 2024-03-22 | End: 2024-03-22

## 2024-03-22 RX ORDER — HYDROMORPHONE HYDROCHLORIDE 1 MG/ML
0.4 INJECTION, SOLUTION INTRAMUSCULAR; INTRAVENOUS; SUBCUTANEOUS EVERY 5 MIN PRN
Status: DISCONTINUED | OUTPATIENT
Start: 2024-03-22 | End: 2024-03-22 | Stop reason: HOSPADM

## 2024-03-22 RX ORDER — ONDANSETRON 2 MG/ML
4 INJECTION INTRAMUSCULAR; INTRAVENOUS EVERY 6 HOURS PRN
Status: DISCONTINUED | OUTPATIENT
Start: 2024-03-22 | End: 2024-03-22 | Stop reason: HOSPADM

## 2024-03-22 RX ORDER — NALOXONE HYDROCHLORIDE 1 MG/ML
0.1 INJECTION INTRAMUSCULAR; INTRAVENOUS; SUBCUTANEOUS
Status: DISCONTINUED | OUTPATIENT
Start: 2024-03-22 | End: 2024-03-22 | Stop reason: HOSPADM

## 2024-03-22 RX ORDER — ONDANSETRON 4 MG/1
4 TABLET, ORALLY DISINTEGRATING ORAL EVERY 6 HOURS PRN
Status: DISCONTINUED | OUTPATIENT
Start: 2024-03-22 | End: 2024-03-22 | Stop reason: HOSPADM

## 2024-03-22 RX ORDER — OXYCODONE HYDROCHLORIDE 5 MG/1
10 TABLET ORAL
Status: DISCONTINUED | OUTPATIENT
Start: 2024-03-22 | End: 2024-03-22 | Stop reason: HOSPADM

## 2024-03-22 RX ORDER — NALOXONE HYDROCHLORIDE 1 MG/ML
0.2 INJECTION INTRAMUSCULAR; INTRAVENOUS; SUBCUTANEOUS
Status: DISCONTINUED | OUTPATIENT
Start: 2024-03-22 | End: 2024-03-22 | Stop reason: HOSPADM

## 2024-03-22 RX ORDER — FENTANYL CITRATE 50 UG/ML
25 INJECTION, SOLUTION INTRAMUSCULAR; INTRAVENOUS EVERY 5 MIN PRN
Status: DISCONTINUED | OUTPATIENT
Start: 2024-03-22 | End: 2024-03-22 | Stop reason: HOSPADM

## 2024-03-22 RX ORDER — HYDROMORPHONE HYDROCHLORIDE 1 MG/ML
0.2 INJECTION, SOLUTION INTRAMUSCULAR; INTRAVENOUS; SUBCUTANEOUS EVERY 5 MIN PRN
Status: DISCONTINUED | OUTPATIENT
Start: 2024-03-22 | End: 2024-03-22 | Stop reason: HOSPADM

## 2024-03-22 RX ORDER — NALOXONE HYDROCHLORIDE 1 MG/ML
0.4 INJECTION INTRAMUSCULAR; INTRAVENOUS; SUBCUTANEOUS
Status: DISCONTINUED | OUTPATIENT
Start: 2024-03-22 | End: 2024-03-22 | Stop reason: HOSPADM

## 2024-03-22 RX ORDER — OXYCODONE HYDROCHLORIDE 5 MG/1
5 TABLET ORAL
Status: DISCONTINUED | OUTPATIENT
Start: 2024-03-22 | End: 2024-03-22 | Stop reason: HOSPADM

## 2024-03-22 RX ORDER — NALOXONE HYDROCHLORIDE 0.4 MG/ML
0.1 INJECTION, SOLUTION INTRAMUSCULAR; INTRAVENOUS; SUBCUTANEOUS
Status: DISCONTINUED | OUTPATIENT
Start: 2024-03-22 | End: 2024-03-22 | Stop reason: HOSPADM

## 2024-03-22 RX ADMIN — SODIUM CHLORIDE, POTASSIUM CHLORIDE, SODIUM LACTATE AND CALCIUM CHLORIDE: 600; 310; 30; 20 INJECTION, SOLUTION INTRAVENOUS at 10:52

## 2024-03-22 RX ADMIN — ONDANSETRON 4 MG: 2 INJECTION INTRAMUSCULAR; INTRAVENOUS at 10:06

## 2024-03-22 RX ADMIN — DEXMEDETOMIDINE HYDROCHLORIDE 4 MCG: 100 INJECTION, SOLUTION INTRAVENOUS at 09:43

## 2024-03-22 RX ADMIN — LABETALOL HYDROCHLORIDE 10 MG: 5 INJECTION, SOLUTION INTRAVENOUS at 09:50

## 2024-03-22 RX ADMIN — PROPOFOL 50 MG: 10 INJECTION, EMULSION INTRAVENOUS at 09:46

## 2024-03-22 RX ADMIN — SODIUM CHLORIDE, POTASSIUM CHLORIDE, SODIUM LACTATE AND CALCIUM CHLORIDE: 600; 310; 30; 20 INJECTION, SOLUTION INTRAVENOUS at 09:40

## 2024-03-22 RX ADMIN — PROPOFOL 150 MCG/KG/MIN: 10 INJECTION, EMULSION INTRAVENOUS at 09:45

## 2024-03-22 RX ADMIN — PROPOFOL 50 MG: 10 INJECTION, EMULSION INTRAVENOUS at 09:49

## 2024-03-22 RX ADMIN — LIDOCAINE HYDROCHLORIDE 30 ML: 10 INJECTION, SOLUTION INFILTRATION; PERINEURAL at 09:46

## 2024-03-22 ASSESSMENT — ACTIVITIES OF DAILY LIVING (ADL)
ADLS_ACUITY_SCORE: 35
ADLS_ACUITY_SCORE: 33
ADLS_ACUITY_SCORE: 35

## 2024-03-22 ASSESSMENT — ENCOUNTER SYMPTOMS: DYSRHYTHMIAS: 1

## 2024-03-22 NOTE — ANESTHESIA POSTPROCEDURE EVALUATION
Patient: Neelima Zuniga    Procedure: Procedure(s):  ENDOSCOPIC ULTRASOUND, UPPER TRACT/  ESOPHAGOGASTRODUODENOSCOPY, POLYPECTOMY, GASTRIC AND DUODENAL BIOPSY       Anesthesia Type:  MAC    Note:  Disposition: Outpatient   Postop Pain Control: Uneventful            Sign Out: Well controlled pain   PONV: No   Neuro/Psych: Uneventful            Sign Out: Acceptable/Baseline neuro status   Airway/Respiratory: Uneventful            Sign Out: Acceptable/Baseline resp. status   CV/Hemodynamics: Uneventful            Sign Out: Acceptable CV status; No obvious hypovolemia; No obvious fluid overload   Other NRE: NONE   DID A NON-ROUTINE EVENT OCCUR? No    Event details/Postop Comments:  Patient recovering comfortably.        Last vitals:  Vitals Value Taken Time   /55 03/22/24 1100   Temp 36.7  C (98  F) 03/22/24 1057   Pulse 75 03/22/24 1109   Resp 51 03/22/24 1103   SpO2 92 % 03/22/24 1109   Vitals shown include unfiled device data.    Electronically Signed By: Abraham Mora DO  March 22, 2024  11:25 AM

## 2024-03-22 NOTE — H&P
GENERAL PRE-PROCEDURE:   Procedure:  EUS - Gastric Submucosal Lesion  Date/Time:  3/22/2024 7:29 AM    Verbal consent obtained?: Yes    Written consent obtained?: Yes    Risks and benefits: Risks, benefits and alternatives were discussed    Consent given by:  Patient  Patient states understanding of procedure being performed: Yes    Patient's understanding of procedure matches consent: Yes    Procedure consent matches procedure scheduled: Yes    Expected level of sedation:  Deep  Appropriately NPO:  Yes  ASA Class:  3  Mallampati  :  Grade 3- soft palate visible, posterior pharyngeal wall not visible  Lungs:  Lungs clear with good breath sounds bilaterally  Heart:  Normal heart sounds and rate and systolic murmur  History & Physical reviewed:  History and physical reviewed and no updates needed  Statement of review:  I have reviewed the lab findings, diagnostic data, medications, and the plan for sedation

## 2024-03-22 NOTE — ANESTHESIA CARE TRANSFER NOTE
Patient: Neelima Zuniga    Procedure: Procedure(s):  ENDOSCOPIC ULTRASOUND, UPPER TRACT/  ESOPHAGOGASTRODUODENOSCOPY, POLYPECTOMY, GASTRIC AND DUODENAL BIOPSY       Diagnosis: Gastrointestinal tract imaging abnormality [R93.3]  Diagnosis Additional Information: No value filed.    Anesthesia Type:   MAC     Note:    Oropharynx: oropharynx clear of all foreign objects  Level of Consciousness: awake  Oxygen Supplementation: face mask  Level of Supplemental Oxygen (L/min / FiO2): 8  Independent Airway: airway patency satisfactory and stable  Dentition: dentition unchanged  Vital Signs Stable: post-procedure vital signs reviewed and stable  Report to RN Given: handoff report given  Patient transferred to: PACU    Handoff Report: Identifed the Patient, Identified the Reponsible Provider, Reviewed the pertinent medical history, Discussed the surgical course, Reviewed Intra-OP anesthesia mangement and issues during anesthesia, Set expectations for post-procedure period and Allowed opportunity for questions and acknowledgement of understanding      Vitals:  Vitals Value Taken Time   /58 03/22/24 1023   Temp     Pulse 84 03/22/24 1027   Resp 41 03/22/24 1027   SpO2 95 % 03/22/24 1027   Vitals shown include unfiled device data.    Electronically Signed By: VALERIE Engle CRNA  March 22, 2024  10:28 AM

## 2024-03-22 NOTE — ANESTHESIA PREPROCEDURE EVALUATION
Anesthesia Pre-Procedure Evaluation    Patient: Neelima Zuniga   MRN: [id number] : 1945        Procedure : Procedure(s):  ENDOSCOPIC ULTRASOUND, UPPER TRACT/  ESOPHAGOGASTRODUODENOSCOPY          Past Medical History:   Diagnosis Date     Allergic rhinitis      Amblyopia     left eye     Anxiety      Aortic valve sclerosis      Basal cell carcinoma (BCC) of eye     Infraorbital left     Breast cancer (H) 2007     Cancer (H) 2009    right breast     Diabetes mellitus, type 2 (H)      Dysphagia      Gastric mass      Heart murmur      HTN (hypertension)      Hyperlipidemia      Left ventricular hypertrophy      Left ventricular outflow tract obstruction      Osteopenia      Palpitations      Reflux esophagitis      Sleep apnea     CPAP     SVT (supraventricular tachycardia)      Uterine fibroid       Past Surgical History:   Procedure Laterality Date     BIOPSY BREAST Right 2007     BREAST SURGERY Right     reconstruction     BREAST SURGERY Left     augmentation     CATARACT IOL, RT/LT        SECTION       EXAM UNDER ANESTHESIA RECTUM N/A 10/28/2022    Procedure: RECTAL EXAM UNDER ANESTHESIA;  Surgeon: Priscilla Orr MD;  Location: [facility]     EXAM UNDER ANESTHESIA, INJECT BOTOX N/A 10/28/2022    Procedure: BOTOX INJECTION, EXCISION PAPILLA;  Surgeon: Priscilla Orr MD;  Location: [facility] OR     FOOT SURGERY Right      HYSTERECTOMY  2015     MAMMOPLASTY AUGMENTATION Bilateral      MASTECTOMY Right 2007     MOHS MICROGRAPHIC PROCEDURE       OOPHORECTOMY Bilateral 2015     SPHINCTEROTOMY RECTUM N/A 10/28/2022    Procedure: POSSIBLE SPHINCTEROTOMY;  Surgeon: Priscilla Orr MD;  Location: [facility]      Allergies   Allergen Reactions     Erythromycin      Metoprolol Nausea     Seasonal Allergies      Other Reaction(s): Unknown     Sulfamethoxazole-Trimethoprim Hives     Sulfa Antibiotics Rash      Social History     Tobacco Use     Smoking status:  Never     Smokeless tobacco: Never   Substance Use Topics     Alcohol use: Not Currently      Wt Readings from Last 1 Encounters:   03/22/24 72.2 kg (159 lb 1 oz)        Anesthesia Evaluation            ROS/MED HX  ENT/Pulmonary:     (+) sleep apnea,                                       Neurologic:       Cardiovascular: Comment: Interpretation Summary     1.Left ventricular size, wall motion and function are normal. The ejection  fraction is > 65%.  2.There is borderline concentric left ventricular hypertrophy.  3.An intracavitary gradient is present. Peak velocity is 6.6 m/sec with  gradient mean over 60 mmHg. Ao valve not well seen. Doubt significant AS  4.Normal right ventricle size and systolic function.  5.The left atrium is mild to moderately dilated.  6.No hemodynamically significant valvular abnormalities on 2D or color flow  imaging.  7.The study was technically difficult.  Compared to the prior study dated 1/12/2023, the LV gradient is higher. Would  suggest CHAITANYA or MRI to visualzie Ao Valve as doubt significant AS.      (+)  hypertension- -   -  - -      CHF                  dysrhythmias,              METS/Exercise Tolerance:     Hematologic:       Musculoskeletal:       GI/Hepatic:       Renal/Genitourinary:       Endo:     (+)  type II DM,                    Psychiatric/Substance Use:       Infectious Disease:       Malignancy:       Other:            Physical Exam    Airway        Mallampati: II   TM distance: > 3 FB   Neck ROM: full     Respiratory Devices and Support         Dental       (+) Minor Abnormalities - some fillings, tiny chips      Cardiovascular   cardiovascular exam normal          Pulmonary   pulmonary exam normal            OUTSIDE LABS:  CBC:   Lab Results   Component Value Date    WBC 7.3 12/21/2022    WBC 5.0 04/06/2022    HGB 15.0 12/21/2022    HGB 14.2 04/06/2022    HCT 46.8 12/21/2022    HCT 42.3 04/06/2022     12/21/2022     04/06/2022     BMP:   Lab Results  "  Component Value Date     03/01/2024     01/05/2024    POTASSIUM 4.2 03/01/2024    POTASSIUM 4.3 01/05/2024    CHLORIDE 100 03/01/2024    CHLORIDE 102 01/05/2024    CO2 27 03/01/2024    CO2 29 01/05/2024    BUN 22.1 03/01/2024    BUN 16.9 01/05/2024    CR 0.65 03/01/2024    CR 0.63 01/05/2024     (H) 03/01/2024     (H) 01/05/2024     COAGS: No results found for: \"PTT\", \"INR\", \"FIBR\"  POC: No results found for: \"BGM\", \"HCG\", \"HCGS\"  HEPATIC:   Lab Results   Component Value Date    ALBUMIN 4.4 01/05/2024    PROTTOTAL 6.7 01/05/2024    ALT 25 01/05/2024    AST 24 01/05/2024    ALKPHOS 84 01/05/2024    BILITOTAL 0.4 01/05/2024     OTHER:   Lab Results   Component Value Date    A1C 6.6 (H) 01/05/2024    BECKY 8.9 03/01/2024    TSH 1.88 12/21/2022    T4 0.98 07/20/2022       Anesthesia Plan    ASA Status:  3       Anesthesia Type: MAC.              Consents    Anesthesia Plan(s) and associated risks, benefits, and realistic alternatives discussed. Questions answered and patient/representative(s) expressed understanding.     - Discussed: Risks, Benefits and Alternatives for BOTH SEDATION and the PROCEDURE were discussed     - Discussed with:  Patient            Postoperative Care    Pain management: IV analgesics, Oral pain medications.   PONV prophylaxis: Ondansetron (or other 5HT-3), Dexamethasone or Solumedrol     Comments:             Abraham Mora DO    I have reviewed the pertinent notes and labs in the chart from the past 30 days and (re)examined the patient.  Any updates or changes from those notes are reflected in this note.              # DMII: A1C = 6.6 % (Ref range: <5.7 %) within past 6 months  # Overweight: Estimated body mass index is 27.3 kg/m  as calculated from the following:    Height as of this encounter: 1.626 m (5' 4\").    Weight as of this encounter: 72.2 kg (159 lb 1 oz).      "

## 2024-03-26 DIAGNOSIS — I42.2 HYPERTROPHIC CARDIOMYOPATHY (H): ICD-10-CM

## 2024-03-26 DIAGNOSIS — Q24.8 LEFT VENTRICULAR OUTFLOW TRACT OBSTRUCTION: Primary | ICD-10-CM

## 2024-03-26 PROCEDURE — 88342 IMHCHEM/IMCYTCHM 1ST ANTB: CPT | Mod: 26 | Performed by: PATHOLOGY

## 2024-03-26 PROCEDURE — 88305 TISSUE EXAM BY PATHOLOGIST: CPT | Mod: 26 | Performed by: PATHOLOGY

## 2024-03-27 ENCOUNTER — TELEPHONE (OUTPATIENT)
Dept: CARDIOLOGY | Facility: CLINIC | Age: 79
End: 2024-03-27
Payer: MEDICARE

## 2024-03-27 NOTE — TELEPHONE ENCOUNTER
[facility] Call Center    Phone Message    May a detailed message be left on voicemail: yes     Reason for Call: Appointment Intake    Referring Provider Name:     Jose Zuniga MD in [facility]     Diagnosis and/or Symptoms:     Left ventricular outflow tract obstruction [Q24.8]  Hypertrophic cardiomyopathy (H) [I42.2]     [facility]-location    Dr. Kinsey team  hypertrophic cardiomyopathy   Action Taken: Other: cardiology     Travel Screening: Not Applicable                                        Thank you!  [facility]

## 2024-03-29 ENCOUNTER — TELEPHONE (OUTPATIENT)
Dept: CARDIOLOGY | Facility: CLINIC | Age: 79
End: 2024-03-29
Payer: MEDICARE

## 2024-03-29 DIAGNOSIS — I35.0 NONRHEUMATIC AORTIC VALVE STENOSIS: ICD-10-CM

## 2024-03-29 DIAGNOSIS — I51.89 LEFT VENTRICULAR OUTFLOW TRACT OBSTRUCTION AFTER PROCEDURE: Primary | ICD-10-CM

## 2024-03-29 DIAGNOSIS — I42.2 HYPERTROPHIC CARDIOMYOPATHY (H): ICD-10-CM

## 2024-03-29 DIAGNOSIS — R06.09 DOE (DYSPNEA ON EXERTION): ICD-10-CM

## 2024-03-29 DIAGNOSIS — I97.89 LEFT VENTRICULAR OUTFLOW TRACT OBSTRUCTION AFTER PROCEDURE: Primary | ICD-10-CM

## 2024-03-29 NOTE — TELEPHONE ENCOUNTER
[facility] Call Center    Phone Message    May a detailed message be left on voicemail: yes     Reason for Call: Other: PT has a referral from Dr. MARCELLE Zuniga for     Left ventricular outflow tract obstruction [Q24.8]  Hypertrophic cardiomyopathy   And the referral also referrerences Congenital/Genetics.   Pt stated she does not have any children and her parents have  so she is not interested in pursuing anything w/Genetics now.  She is much more interested in looking at the heart failure Dx and focusing on those as she is planning a [facility] trip this summer and wants to ensure she is heathy enough for that.   lvm for Abdiel Devine for call back on Monday for pt appt.    Action Taken: Message routed to:  [facility] cardio    Travel Screening: Not Applicable    Thank you!  [facility]

## 2024-04-01 NOTE — TELEPHONE ENCOUNTER
Date: 4/1/2024    Time of Call: 2:22 PM     Diagnosis: HCM and Left ventricular outflow tract obstruction (LVOTO)      [ TORB ] Ordering provider: Dr. Kinsey  Order: Establish care with Dr. Kinsey with 14 day Ziopatch monitor and labs prior.     Order received by: Mark Shelley RN      Follow-up/additional notes: Called and LVM to return call to discuss referral for CV Genetics with Dr. Kinsey. Sent to scheduling.       Referred by Dr. Jose Zuniga, [facility]  Clinic Notes from 2/1/24  Patient with known history of SVT which she has been on longstanding digoxin for.  She also has a history of dynamic left ventricular outflow tract obstruction which resolved after discontinuing her hydrochlorothiazide however, has returned recent echocardiogram and associated with shortness of breath with activity.  The shortness of breath is worse over the last couple of months.     She also has an element of aortic stenosis on previous echocardiograms and somewhat difficult to evaluate her aortic valve on the most recent echocardiogram given the dynamic left ventricular outflow tract obstruction.  I have reviewed the echocardiogram and there is also systolic anterior motion of the mitral valve leaflets.     Digoxin would be relatively contraindicated in this situation and would like to get her off of that at some point.  Will try her on low-dose metoprolol, 12.5 mg each day.  She had some nausea with beta-blockers in the past but she is willing to try it again and see if she has any symptoms.     We have scheduled her for a cardiac MRI this month and this will help us see the aortic valve as well as the dynamic left ventricular outflow tract obstruction left ventricular wall thickness and myocardium.  Will call her with these results as well.     Will check a B nitric peptide today as well.    Genetic testing:    Prior Testing:   *CMR 3/20/24 Images: In PACs  1.  Normal left ventricular size.  Severe asymmetric left  ventricular hypertrophy.  The maximal thickness  of basal anteroseptal wall is 1.7 cm.  The thickness of posterolateral wall is 1.2 cm. There is obvious  accelerated LVOT flow velocity indicating some obstruction.  However, there is no anterior septal motion of  mitral valve. There is no rest perfusion defect.  The calculated left ventricular ejection fraction is 62%.  2.  No prior myocardial infarction, myocardial scar or other infiltrative process.  T1 mapping value is  mildly elevated to 1016 ms (normal reference less than 1000 ms).  3.  Normal right ventricular size and systolic function.  4.  Mild to moderate mitral and tricuspid valve regurgitation.     Comments: Put all together, the differential diagnosis is hypertensive cardiomyopathy versus hypertrophic  cardiomyopathy.  It is not consistent with amyloid cardiomyopathy.    *Echo 1/19/24 Images: In PACs  Interpretation Summary  1.Left ventricular size, wall motion and function are normal. The ejection  fraction is > 65%.  2.There is borderline concentric left ventricular hypertrophy.  3.An intracavitary gradient is present. Peak velocity is 6.6 m/sec with  gradient mean over 60 mmHg. Ao valve not well seen. Doubt significant AS  4.Normal right ventricle size and systolic function.  5.The left atrium is mild to moderately dilated.  6.No hemodynamically significant valvular abnormalities on 2D or color flow  imaging.  7.The study was technically difficult.  Compared to the prior study dated 1/12/2023, the LV gradient is higher. Would  suggest CHAITANYA or MRI to visualzie Ao Valve as doubt significant AS.  ______________________________________________________

## 2024-04-02 ENCOUNTER — ONCOLOGY VISIT (OUTPATIENT)
Dept: ONCOLOGY | Facility: CLINIC | Age: 79
End: 2024-04-02
Attending: INTERNAL MEDICINE
Payer: MEDICARE

## 2024-04-02 VITALS
RESPIRATION RATE: 16 BRPM | BODY MASS INDEX: 27.69 KG/M2 | DIASTOLIC BLOOD PRESSURE: 70 MMHG | HEART RATE: 100 BPM | OXYGEN SATURATION: 95 % | WEIGHT: 161.3 LBS | SYSTOLIC BLOOD PRESSURE: 139 MMHG | TEMPERATURE: 98.4 F

## 2024-04-02 DIAGNOSIS — C50.411 MALIGNANT NEOPLASM OF UPPER-OUTER QUADRANT OF RIGHT BREAST IN FEMALE, ESTROGEN RECEPTOR POSITIVE (H): Primary | ICD-10-CM

## 2024-04-02 DIAGNOSIS — Z17.0 MALIGNANT NEOPLASM OF UPPER-OUTER QUADRANT OF RIGHT BREAST IN FEMALE, ESTROGEN RECEPTOR POSITIVE (H): Primary | ICD-10-CM

## 2024-04-02 DIAGNOSIS — Z12.31 ENCOUNTER FOR SCREENING MAMMOGRAM FOR MALIGNANT NEOPLASM OF BREAST: ICD-10-CM

## 2024-04-02 PROCEDURE — 99213 OFFICE O/P EST LOW 20 MIN: CPT | Performed by: INTERNAL MEDICINE

## 2024-04-02 PROCEDURE — G0463 HOSPITAL OUTPT CLINIC VISIT: HCPCS | Performed by: INTERNAL MEDICINE

## 2024-04-02 ASSESSMENT — PAIN SCALES - GENERAL: PAINLEVEL: NO PAIN (0)

## 2024-04-02 NOTE — LETTER
2024         RE: Neelima Zuniga  [address]  [place] [address]        Dear Colleague,    Thank you for referring your patient, Neelima Zuniga, to the [facility] [place]. Please see a copy of my visit note below.    Neelima Zuniga is a 78-year-old patient who is here today for interim followup.     CHIEF COMPLAINTS:  1.  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  2.  Attempted chemoprevention with Evista, but could not tolerate it.     HISTORY OF PRESENT ILLNESS:  Neelima is 78 years old.  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  She is status post right-sided mastectomy with reconstruction.  She did attempt to have Evista for prevention back when she was diagnosed, but she could not tolerate it.  She does come in on a yearly basis for physical exam and reevaluation.  Unfortunately, a few years back, her daughter  suddenly and just in  her  .   Neelima  is doing actually quite well living alone.  She has been traveling and has more travel lined up.    She does seem to have an encapsulated implant on the right side where she had a mastectomy and so she is going to meet with Dr. Carranza to see what her options would be on that side as it does not seem to be uncomfortable.    COMPREHENSIVE REVIEW OF SYSTEMS:    On 10-point comprehensive review of systems, her overall health is good and she has no worrisome symptomatology from my standpoint.  She does tend to keep busy and exercises.     MEDICATIONS AND ALLERGIES:  Outlined in the nursing records.     SOCIAL HISTORY:  The patient is now a .  She is a nonsmoker.     PHYSICAL EXAMINATION:    GENERAL:  She is a well-appearing lady in no acute distress.  VITAL SIGNS:  Stable.  Weight is 155 pounds.  NECK:  No masses or goiter.  LYMPHATICS:  There is no cervical, supraclavicular, infraclavicular adenopathy.  CHEST:  Clear to auscultation and percussion bilaterally.  HEART:  Sounds 1,  2 normal.  No added sounds.  No murmurs.  BREASTS:  Right breast:  Status post right-sided mastectomy with implant.  Left breast:  No palpable masses.  Left axilla negative.  ABDOMEN:  Soft and nontender.  No hepatosplenomegaly.  EXTREMITIES:  Legs without tenderness or edema.  NEUROLOGIC:  The patient is alert and oriented x3.  SKIN:  No rashes or lesions.      Data review.    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  She had a right-sided mastectomy with an implant.  The implant appears encapsulated and she is going to meet with the plastic surgeon to discuss options.  She is up-to-date on a left-sided mammogram and she will get another 1 in September 2024    Overall I think she is doing well and she will be seen back in about a year.      Charmaine Wilson MD       Again, thank you for allowing me to participate in the care of your patient.        Sincerely,        Charmaine Wilson MD

## 2024-04-02 NOTE — PROGRESS NOTES
Neelima Zuniga is a 78-year-old patient who is here today for interim followup.     CHIEF COMPLAINTS:  1.  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  2.  Attempted chemoprevention with Evista, but could not tolerate it.     HISTORY OF PRESENT ILLNESS:  Neelima is 78 years old.  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  She is status post right-sided mastectomy with reconstruction.  She did attempt to have Evista for prevention back when she was diagnosed, but she could not tolerate it.  She does come in on a yearly basis for physical exam and reevaluation.  Unfortunately, a few years back, her daughter  suddenly and just in  her  .   Neelima  is doing actually quite well living alone.  She has been traveling and has more travel lined up.    She does seem to have an encapsulated implant on the right side where she had a mastectomy and so she is going to meet with Dr. Carranza to see what her options would be on that side as it does not seem to be uncomfortable.    COMPREHENSIVE REVIEW OF SYSTEMS:    On 10-point comprehensive review of systems, her overall health is good and she has no worrisome symptomatology from my standpoint.  She does tend to keep busy and exercises.     MEDICATIONS AND ALLERGIES:  Outlined in the nursing records.     SOCIAL HISTORY:  The patient is now a .  She is a nonsmoker.     PHYSICAL EXAMINATION:    GENERAL:  She is a well-appearing lady in no acute distress.  VITAL SIGNS:  Stable.  Weight is 155 pounds.  NECK:  No masses or goiter.  LYMPHATICS:  There is no cervical, supraclavicular, infraclavicular adenopathy.  CHEST:  Clear to auscultation and percussion bilaterally.  HEART:  Sounds 1, 2 normal.  No added sounds.  No murmurs.  BREASTS:  Right breast:  Status post right-sided mastectomy with implant.  Left breast:  No palpable masses.  Left axilla negative.  ABDOMEN:  Soft and nontender.  No hepatosplenomegaly.  EXTREMITIES:  Legs without  tenderness or edema.  NEUROLOGIC:  The patient is alert and oriented x3.  SKIN:  No rashes or lesions.      Data review.    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  She had a right-sided mastectomy with an implant.  The implant appears encapsulated and she is going to meet with the plastic surgeon to discuss options.  She is up-to-date on a left-sided mammogram and she will get another 1 in September 2024    Overall I think she is doing well and she will be seen back in about a year.      Charmaine Wilson MD

## 2024-04-02 NOTE — NURSING NOTE
"Oncology Rooming Note    April 2, 2024 2:35 PM   Neelima Zuniga is a 78 year old female who presents for:    Chief Complaint   Patient presents with    Oncology Clinic Visit     Initial Vitals: /70   Pulse 100   Temp 98.4  F (36.9  C) (Tympanic)   Resp 16   Wt 73.2 kg (161 lb 4.8 oz)   SpO2 95%   BMI 27.69 kg/m   Estimated body mass index is 27.69 kg/m  as calculated from the following:    Height as of 3/22/24: 1.626 m (5' 4\").    Weight as of this encounter: 73.2 kg (161 lb 4.8 oz). Body surface area is 1.82 meters squared.  No Pain (0) Comment: Data Unavailable   No LMP recorded. Patient is postmenopausal.  Allergies reviewed: Yes  Medications reviewed: Yes    Medications: Medication refills not needed today.  Pharmacy name entered into PerSay:    [id number] - [place], [place] - [address]  [id number] [place], [place] - [address] [place] [address] PHARMACY MAIL DELIVERY - [facility] [address]    Frailty Screening:   Is the patient here for a new oncology consult visit in cancer care? 2. No      Clinical concerns: f/u       Shanthi Corona CMA              "

## 2024-04-05 ENCOUNTER — ORDERS ONLY (AUTO-RELEASED) (OUTPATIENT)
Dept: CARDIOLOGY | Facility: CLINIC | Age: 79
End: 2024-04-05
Payer: MEDICARE

## 2024-04-05 DIAGNOSIS — I51.89 LEFT VENTRICULAR OUTFLOW TRACT OBSTRUCTION AFTER PROCEDURE: ICD-10-CM

## 2024-04-05 DIAGNOSIS — I42.2 HYPERTROPHIC CARDIOMYOPATHY (H): ICD-10-CM

## 2024-04-05 DIAGNOSIS — I97.89 LEFT VENTRICULAR OUTFLOW TRACT OBSTRUCTION AFTER PROCEDURE: ICD-10-CM

## 2024-04-23 ENCOUNTER — TELEPHONE (OUTPATIENT)
Dept: CARDIOLOGY | Facility: CLINIC | Age: 79
End: 2024-04-23
Payer: MEDICARE

## 2024-04-23 NOTE — TELEPHONE ENCOUNTER
[facility] Call Center    Phone Message    May a detailed message be left on voicemail: yes     Reason for Call: Other: pt was calling about how far out the genetic appt is and what she should do in between now and then. Please call pt back to discuss.      Action Taken: Other: cardiology     Travel Screening: Not Applicable                                                              Thank you!  [facility]

## 2024-04-25 NOTE — TELEPHONE ENCOUNTER
Left detailed message regarding call back request, plan from Dr. Tio taylor for 14 days- mailed on 04/04/24. Scheduled 07/2024 -bridgette

## 2024-05-04 ENCOUNTER — HEALTH MAINTENANCE LETTER (OUTPATIENT)
Age: 79
End: 2024-05-04

## 2024-05-05 PROCEDURE — 93248 EXT ECG>7D<15D REV&INTERPJ: CPT | Performed by: INTERNAL MEDICINE

## 2024-05-20 ENCOUNTER — TELEPHONE (OUTPATIENT)
Dept: CARDIOLOGY | Facility: CLINIC | Age: 79
End: 2024-05-20
Payer: MEDICARE

## 2024-05-20 NOTE — TELEPHONE ENCOUNTER
Attempted to reach pt to reschedule her appt to 6/11 with Tio Zuluaga, no answer, a detailed message was left

## 2024-06-11 ENCOUNTER — PRE VISIT (OUTPATIENT)
Dept: CARDIOLOGY | Facility: CLINIC | Age: 79
End: 2024-06-11

## 2024-06-11 ENCOUNTER — OFFICE VISIT (OUTPATIENT)
Dept: CARDIOLOGY | Facility: CLINIC | Age: 79
End: 2024-06-11
Attending: INTERNAL MEDICINE
Payer: MEDICARE

## 2024-06-11 ENCOUNTER — LAB (OUTPATIENT)
Dept: LAB | Facility: CLINIC | Age: 79
End: 2024-06-11
Attending: INTERNAL MEDICINE
Payer: MEDICARE

## 2024-06-11 VITALS
HEART RATE: 72 BPM | HEIGHT: 69 IN | WEIGHT: 159.8 LBS | BODY MASS INDEX: 23.67 KG/M2 | OXYGEN SATURATION: 93 % | DIASTOLIC BLOOD PRESSURE: 75 MMHG | SYSTOLIC BLOOD PRESSURE: 147 MMHG

## 2024-06-11 DIAGNOSIS — I97.89 LEFT VENTRICULAR OUTFLOW TRACT OBSTRUCTION AFTER PROCEDURE: ICD-10-CM

## 2024-06-11 DIAGNOSIS — I35.0 NONRHEUMATIC AORTIC VALVE STENOSIS: ICD-10-CM

## 2024-06-11 DIAGNOSIS — I51.89 LEFT VENTRICULAR OUTFLOW TRACT OBSTRUCTION AFTER PROCEDURE: ICD-10-CM

## 2024-06-11 DIAGNOSIS — I48.92 ATRIAL FLUTTER, UNSPECIFIED TYPE (H): ICD-10-CM

## 2024-06-11 DIAGNOSIS — I47.10 SVT (SUPRAVENTRICULAR TACHYCARDIA) (H): ICD-10-CM

## 2024-06-11 DIAGNOSIS — I42.1 HOCM (HYPERTROPHIC OBSTRUCTIVE CARDIOMYOPATHY) (H): Primary | ICD-10-CM

## 2024-06-11 DIAGNOSIS — Q24.8 LEFT VENTRICULAR OUTFLOW TRACT OBSTRUCTION: ICD-10-CM

## 2024-06-11 DIAGNOSIS — I42.2 HYPERTROPHIC CARDIOMYOPATHY (H): ICD-10-CM

## 2024-06-11 DIAGNOSIS — R06.09 DOE (DYSPNEA ON EXERTION): ICD-10-CM

## 2024-06-11 LAB
ALBUMIN SERPL BCG-MCNC: 4.4 G/DL (ref 3.5–5.2)
ALP SERPL-CCNC: 109 U/L (ref 40–150)
ALT SERPL W P-5'-P-CCNC: 27 U/L (ref 0–50)
ANION GAP SERPL CALCULATED.3IONS-SCNC: 10 MMOL/L (ref 7–15)
AST SERPL W P-5'-P-CCNC: 25 U/L (ref 0–45)
BASOPHILS # BLD AUTO: 0 10E3/UL (ref 0–0.2)
BASOPHILS NFR BLD AUTO: 1 %
BILIRUB SERPL-MCNC: 0.5 MG/DL
BUN SERPL-MCNC: 20 MG/DL (ref 8–23)
CALCIUM SERPL-MCNC: 9.4 MG/DL (ref 8.8–10.2)
CHLORIDE SERPL-SCNC: 105 MMOL/L (ref 98–107)
CREAT SERPL-MCNC: 0.68 MG/DL (ref 0.51–0.95)
DEPRECATED HCO3 PLAS-SCNC: 27 MMOL/L (ref 22–29)
EGFRCR SERPLBLD CKD-EPI 2021: 88 ML/MIN/1.73M2
EOSINOPHIL # BLD AUTO: 0.2 10E3/UL (ref 0–0.7)
EOSINOPHIL NFR BLD AUTO: 2 %
ERYTHROCYTE [DISTWIDTH] IN BLOOD BY AUTOMATED COUNT: 13.2 % (ref 10–15)
GLUCOSE SERPL-MCNC: 138 MG/DL (ref 70–99)
HCT VFR BLD AUTO: 39.2 % (ref 35–47)
HGB BLD-MCNC: 13 G/DL (ref 11.7–15.7)
IMM GRANULOCYTES # BLD: 0 10E3/UL
IMM GRANULOCYTES NFR BLD: 0 %
LYMPHOCYTES # BLD AUTO: 1.6 10E3/UL (ref 0.8–5.3)
LYMPHOCYTES NFR BLD AUTO: 23 %
MCH RBC QN AUTO: 29.5 PG (ref 26.5–33)
MCHC RBC AUTO-ENTMCNC: 33.2 G/DL (ref 31.5–36.5)
MCV RBC AUTO: 89 FL (ref 78–100)
MONOCYTES # BLD AUTO: 0.6 10E3/UL (ref 0–1.3)
MONOCYTES NFR BLD AUTO: 9 %
NEUTROPHILS # BLD AUTO: 4.5 10E3/UL (ref 1.6–8.3)
NEUTROPHILS NFR BLD AUTO: 65 %
NRBC # BLD AUTO: 0 10E3/UL
NRBC BLD AUTO-RTO: 0 /100
NT-PROBNP SERPL-MCNC: 582 PG/ML (ref 0–1800)
PLATELET # BLD AUTO: 262 10E3/UL (ref 150–450)
POTASSIUM SERPL-SCNC: 4.5 MMOL/L (ref 3.4–5.3)
PROT SERPL-MCNC: 6.7 G/DL (ref 6.4–8.3)
RBC # BLD AUTO: 4.4 10E6/UL (ref 3.8–5.2)
SODIUM SERPL-SCNC: 142 MMOL/L (ref 135–145)
WBC # BLD AUTO: 7 10E3/UL (ref 4–11)

## 2024-06-11 PROCEDURE — 93010 ELECTROCARDIOGRAM REPORT: CPT | Performed by: INTERNAL MEDICINE

## 2024-06-11 PROCEDURE — 85025 COMPLETE CBC W/AUTO DIFF WBC: CPT | Performed by: PATHOLOGY

## 2024-06-11 PROCEDURE — 83880 ASSAY OF NATRIURETIC PEPTIDE: CPT | Performed by: PATHOLOGY

## 2024-06-11 PROCEDURE — G0463 HOSPITAL OUTPT CLINIC VISIT: HCPCS | Performed by: INTERNAL MEDICINE

## 2024-06-11 PROCEDURE — 36415 COLL VENOUS BLD VENIPUNCTURE: CPT | Performed by: PATHOLOGY

## 2024-06-11 PROCEDURE — 99204 OFFICE O/P NEW MOD 45 MIN: CPT | Mod: GC | Performed by: INTERNAL MEDICINE

## 2024-06-11 PROCEDURE — 80053 COMPREHEN METABOLIC PANEL: CPT | Performed by: PATHOLOGY

## 2024-06-11 PROCEDURE — G2211 COMPLEX E/M VISIT ADD ON: HCPCS | Performed by: INTERNAL MEDICINE

## 2024-06-11 PROCEDURE — 93005 ELECTROCARDIOGRAM TRACING: CPT

## 2024-06-11 ASSESSMENT — PAIN SCALES - GENERAL: PAINLEVEL: NO PAIN (0)

## 2024-06-11 NOTE — PATIENT INSTRUCTIONS
You were seen today in the Adult Congenital and Cardiovascular Genetics Clinic at the [facility].    Cardiology Providers you saw during your visit:  Guy Kinsey MD    Diagnosis:  HCM    Results:  Guy Kinsey MD reviewed the results of your cardiac MRI, echo, Ziopatch, EKG and lab testing today in clinic.    Recommendations for you:    Start Eliquis 5 mg twice daily.    Please let us know if you would like to proceed with the Cardioversion with transesophageal echocardiogram (CHAITANYA).   Schedule visit with Genetic counselor, Paige Jama      General Cardiac Recommendations:  Continue to eat a heart healthy, low salt diet.  Continue to get 20-30 minutes of aerobic activity, 4-5 days per week.  Examples of aerobic activity include walking, running, swimming, cycling, etc.  Continue to observe good oral hygiene, with regular dental visits.      SBE prophylaxis:   Yes____  No__x__    Exercise restrictions:   Yes__X__  No____         If yes, list restrictions:  Must be allowed to rest if fatigued or SOB      FASTING CHOLESTEROL was checked in the last 5 years YES__x__  NO____ (2024)  If no, please follow up with your primary care physician. You should have a cholesterol screening every 5 years.      Follow-up:  Follow up with Dr. Bowser after DCCV with CHAITANYA. Follow up with Dacia Ansrai in 6 weeks after (DCCV). Follow up with Dr. Kinsey after completion of DCCV and genetic counseling.     If you have questions or concerns please contact us at:    Mark Shelley RN, BSN     Skye Woods and Eleanor Chavez (Scheduling)  Nurse Care Coordinator     Clinic   CV Genetics      Adult Congenital and CV Genetic  [facility]   [facility]  (P) [phone]     (P) [phone]  (F) [phone]     (F) [phone]      For after hours urgent needs, call [phone] and ask to speak to the Adult Congenital Physician on call.  Mention Job Code  0401.    For emergencies call 911.    [facility]  [facility]  Mail Code [id number]  [address] [facility], [place] [id number]

## 2024-06-11 NOTE — PROGRESS NOTES
HPI:   Neelima Zuniga is a 79 year old female with history of SVT (previously on metoprolol, flecainide, and digoxin), htn    Referred for LVOT obstruction, found on echo after patient was initiated on hydrochlorothiazide.    She goes to the [facility] three times a week, sometimes twice a day. She participates in silver sneaker classes and is able to do these without any symptoms chest pain, dyspnea at rest or with exertion, PND, orthopnea, peripheral edema, palpitations, lightheadedness or syncope.     However she states that when she walks slowly on a flat surface she will experience shortness of breath, this has been going on for a while and there has been no change in the severity of the symptoms. She first noticed this in December 2023 when she was on a walking tour of [facility] and had to walk quickly to keep up with the . She is able to perform ADLs including grocery shopping. She states she does not experience shortness of breath while she is grocery shopping and she is unsure if this is because she leans on the cart for stability.    ECG today shows: 4:1 aflutter with rate 76      PAST MEDICAL HISTORY:  Past Medical History:   Diagnosis Date    Allergic rhinitis     Amblyopia     left eye    Anxiety     Aortic valve sclerosis     Basal cell carcinoma (BCC) of eye 2007    Infraorbital left    Breast cancer (H) 2007    Cancer (H) 2009    right breast    Diabetes mellitus, type 2 (H)     Dysphagia     Gastric mass     Heart murmur     HTN (hypertension)     Hyperlipidemia     Left ventricular hypertrophy     Left ventricular outflow tract obstruction     Osteopenia     Palpitations     Reflux esophagitis     Sleep apnea     CPAP    SVT (supraventricular tachycardia) (H24)     Uterine fibroid        CURRENT MEDICATIONS:  Current Outpatient Medications   Medication Sig Dispense Refill    apixaban ANTICOAGULANT (ELIQUIS ANTICOAGULANT) 5 MG tablet Take 1 tablet (5 mg) by mouth 2 times daily 180  "tablet 3    digoxin (LANOXIN) 250 MCG tablet Take 1 tablet (0.25 mg) by mouth daily 90 tablet 3    eszopiclone (LUNESTA) 2 MG tablet Take 2 mg by mouth nightly as needed for sleep      Glucosamine Sulfate 1500 MG PACK Take 1,500 mg by mouth daily      Lactobacillus Rhamnosus, GG, ( PROBIOTIC DIGESTIVE CARE) CAPS Take 1 capsule by mouth daily      losartan (COZAAR) 100 MG tablet TAKE 1 TABLET BY MOUTH EVERY DAY for 90 days      MAGNESIUM OXIDE PO Take 400 mg by mouth daily      metoprolol succinate ER (TOPROL XL) 25 MG 24 hr tablet Take 0.5 tablets (12.5 mg) by mouth daily 60 tablet 3    MULTIPLE VITAMIN PO daily      omeprazole (PRILOSEC) 40 MG DR capsule Take 40 mg by mouth every morning      polyethylene glycol (MIRALAX/GLYCOLAX) packet Take 1 packet by mouth daily      ROSUVASTATIN CALCIUM PO Take 10 mg by mouth daily      saline 0.65 % SOLN Spray 1 spray in nostril      venlafaxine (EFFEXOR XR) 37.5 MG 24 hr capsule Take 1 capsule (37.5 mg) by mouth daily 90 capsule 3    calcium-vitamin D (CALTRATE) 600-400 MG-UNIT per tablet Take 1 tablet by mouth 2 times daily         ALLERGIES:     Allergies   Allergen Reactions    Erythromycin     Metoprolol Nausea    Seasonal Allergies      Other Reaction(s): Unknown    Sulfamethoxazole-Trimethoprim Hives    Sulfa Antibiotics Rash       FAMILY HISTORY:  Family History   Problem Relation Age of Onset    Macular Degeneration Father     Glaucoma Father     Glaucoma Paternal Grandmother     Diabetes Mother     Hypertension Mother     Hypertension Father     Hyperlipidemia Father      No family history of sudden death.    Mom: CHF  Paternal GF:  from \"heart issues\"   Sister: Afib     SOCIAL HISTORY:  Social History     Tobacco Use    Smoking status: Never    Smokeless tobacco: Never   Vaping Use    Vaping status: Never Used   Substance Use Topics    Alcohol use: Not Currently    Drug use: No       ROS:   A comprehensive 14 point review of systems is negative other than as " "mentioned in HPI.    Exam:  BP (!) 147/75 (BP Location: Right arm, Patient Position: Supine, Cuff Size: Adult Regular)   Pulse 72   Ht 1.753 m (5' 9\")   Wt 72.5 kg (159 lb 12.8 oz)   SpO2 93%   BMI 23.60 kg/m    GENERAL APPEARANCE: healthy, alert and no distress  EYES: no icterus, no xanthelasmas  ENT: normal palate, mucosa moist, no central cyanosis  NECK: JVP not elevated  RESPIRATORY: lungs clear to auscultation - no rales, rhonchi or wheezes, no use of accessory muscles, no retractions, respirations are unlabored, normal respiratory rate  CARDIOVASCULAR: regular rhythm, normal S1 with physiologic split S2, no S3 or S4 and no murmur, click or rub.  EXTREMITIES: no edema  NEURO: alert and oriented to person/place/time, normal speech  SKIN: no ecchymoses, no rashes.  PSYCH: cooperative, affect appropriate.     Labs:  Reviewed.     Testing/Procedures:    I personally visualized and interpreted:    04/2024 Zio   3 runs of nonsustained VT, 1 of which is associated with symptoms.   Runs of nonsustained SVT.  Symptomatic episodes corresponded to sinus rhythm with PACs, PVCs, or a run of nonsustained VT.    3/20/24 CMR  1.  Normal left ventricular size.  Severe asymmetric left ventricular hypertrophy.  The maximal thickness  of basal anteroseptal wall is 1.7 cm.  The thickness of posterolateral wall is 1.2 cm. There is obvious  accelerated LVOT flow velocity indicating some obstruction.  However, there is no anterior septal motion of  mitral valve. There is no rest perfusion defect.  The calculated left ventricular ejection fraction is 62%.  2.  No prior myocardial infarction, myocardial scar or other infiltrative process.  T1 mapping value is  mildly elevated to 1016 ms (normal reference less than 1000 ms).  3.  Normal right ventricular size and systolic function.  4.  Mild to moderate mitral and tricuspid valve regurgitation.    1/19/24 TTE  Left ventricular size, wall motion and function are normal. The " ejection  fraction is 60-65%.  Normal right ventricle size and systolic function.  The left atrium is mildly dilated.  Mild to moderate valvular aortic stenosis.  IVC diameter and respiratory changes fall into an intermediate range  suggesting an RA pressure of 8 mmHg.    1/12/23 TTE   Left ventricular size, wall motion and function are normal. The ejection  fraction is 60-65%.  Normal right ventricle size and systolic function.  The left atrium is mildly dilated.  Mild to moderate valvular aortic stenosis.  IVC diameter and respiratory changes fall into an intermediate range  suggesting an RA pressure of 8 mmHg.    1/25/22 Stress Echo    Interpretation Summary  1. Normal stress echocardiogram without evidence of stress induced ischemia.  2. Normal resting LV systolic performance with an ejection fraction of 55-60%.  There is normal improvement in left ventricular systolic performance with a  peak ejection fraction of 70-75%.  3. No ECG evidence of ischemia.  4. No anginal chest pain reported with exercise.  5. Average functional capacity for age.  6. Two PVCs and a solitary couplet were noted. No other rhythm disturbances  detected.    Assessment and Plan:   The patient was counseled at length regarding the nature of hypertrophic cardiomyopathy including its genetic nature, its natural history, and potential complications.     We discussed the potential complications of HCM, including outflow obstruction, arrhythmias, and heart failure.  She has  LVOT obstruction(gradient 54 mm; calculated from MR doppler tracing). The patient was counseled regarding behavioral interventions to avoid worsening outflow obstruction, in particular avoiding dehydration and minimizing diuretics and peripheral vasodilators.      She has no identified high-risk features for sudden death. 5% MID myocardial In particular, she does not have significant LGE burden on his cardiac MRI (<5% mid myocardial LGE). Maximal LV wall thickness is 1.7  cm. She also has no family history of SCD. ZioPatch showed 3 runs of  did not show any ventricular arrhythmias. She has no history of unexplained syncope.      She does not have any clear evidence of functional limitation. We discussed the activity recommendations for hypertrophic cardiomyopathy, and in particular that moderate intensity aerobic exercise is encouraged and that only the highest intensity competitive sports with start-stop activity (e.g. basketball and ice hockey) are felt to be contraindicated.      We also discussed the genetic nature of HCM and its generally autosomal dominant inheritance. We counseled that all 1st-degree relatives should be screened and explained the strategies of clinical screening and genetic testing. The patient is aware that a pathogentic mutation is only identified in 40-50% of patients, and that in the absence of an identified mutation that would allow cascade testing of family members, that all first-degree relatives should undergo clinical screening.      Lastly we talked about rhythm control strategies for her aflutter including CHAITANYA + cardioversion and ablation. She would like to think about this further before deciding.    1.HCM   2. Aflutter [id number]=3 age (2), F, HTN)    Plan in brief   Referral to genetic counseling   Continue Toprol 12.5 daily   TFTs    Start Eliquis 5 BID.   Follow-up in 6 weeks with GINO for Mavacamtam, vs EtOH ablation vs dysopyramide.     Follow-up TBD based on the above testing.     The patient states understanding and is agreeable with plan.     Sidney Herrmann   Cardiology Fellow  This note was created using Dragon dictation software, so please excuse any mistakes and incorrect syntax and semantics.      Guy Kinsey MD  Cardiology    CC  NIALL ROJAS

## 2024-06-11 NOTE — NURSING NOTE
Chief Complaint   Patient presents with    New Patient     New Genetic Heart- 79 year old female with history of HCM presenting for evaluation.     Vitals were taken, medications reconciled, and EKG was performed.    SINDHU Bautista  10:25 AM

## 2024-06-11 NOTE — TELEPHONE ENCOUNTER
RECORDS RECEIVED FROM:    DATE RECEIVED:    GENERAL RECORDS STATUS DETAILS   OFFICE NOTE from cardiologists Internal 2-1-24 Efrain   LABS Internal    EKG (STRIPS & REPORTS) Internal 3-22-24   MONITORS (STRIPS & REPORTS) Internal 4-5-24   ECHOS (IMAGES AND REPORTS) Internal 1-19-24   CONGENITAL AND GENETICS     MRI/MRA (ALL IMAGES AND REPORTS FROM BIRTH - PRESENT) Internal 3-20-24   CT/CTA (ALL IMAGES AND REPORTS FROM BIRTH - PRESENT) Internal 1-19-24

## 2024-06-11 NOTE — PROGRESS NOTES
oHCM/HOCM, LVOT gradient ~63 mmHg with NYHA class II-III exertional dyspnea (insidiously worsening over the past several years)  Paroxsysmal atrial flutter with 4:1 block, new  Nonsustained VT  -start DOAC  -we will contact her regarding her decision re: management of A.flutter and LVOTO  -CHAITANYA/cardioversion (if she is agreeable)  -EP follow up after TTE/cardioversion for atrial flutter  -follow up with Dacia Ansari NP re: management of symptomatic LVOTO-- options include mavacamten, alcohol septal ablation, and disopyramide  -TTE prior to GINO follow up  -follow up with brenda CARTAGENA based on the above  -genetic counseling referral    {[facility] 2021 Documentation (Optional):074664}  {2021 E&M time (Optional):227595}  The longitudinal plan of care for the diagnosis(es)/condition(s) as documented were addressed during this visit. Due to the added complexity in care, I will continue to support Neelima in the subsequent management and with ongoing continuity of care.    Guy Kinsey MD  Cardiology

## 2024-06-11 NOTE — LETTER
6/11/2024      RE: Neelima Zuniga  [address] [place] [address]       Dear Colleague,    Thank you for the opportunity to participate in the care of your patient, Neelima Zuniga, at the [facility] [place] [facility]. Please see a copy of my visit note below.      HPI:   Neelima Zuniga is a 79 year old female with history of SVT (previously on metoprolol, flecainide, and digoxin), htn    Referred for LVOT obstruction, found on echo after patient was initiated on hydrochlorothiazide.    She goes to the [facility] three times a week, sometimes twice a day. She participates in silver sneaker classes and is able to do these without any symptoms chest pain, dyspnea at rest or with exertion, PND, orthopnea, peripheral edema, palpitations, lightheadedness or syncope.     However she states that when she walks slowly on a flat surface she will experience shortness of breath, this has been going on for a while and there has been no change in the severity of the symptoms. She first noticed this in December 2023 when she was on a walking tour of [facility] and had to walk quickly to keep up with the . She is able to perform ADLs including grocery shopping. She states she does not experience shortness of breath while she is grocery shopping and she is unsure if this is because she leans on the cart for stability.    ECG today shows: 4:1 aflutter with rate 76      PAST MEDICAL HISTORY:  Past Medical History:   Diagnosis Date     Allergic rhinitis      Amblyopia     left eye     Anxiety      Aortic valve sclerosis      Basal cell carcinoma (BCC) of eye 2007    Infraorbital left     Breast cancer (H) 2007     Cancer (H) 2009    right breast     Diabetes mellitus, type 2 (H)      Dysphagia      Gastric mass      Heart murmur      HTN (hypertension)      Hyperlipidemia      Left ventricular hypertrophy      Left ventricular outflow tract  obstruction      Osteopenia      Palpitations      Reflux esophagitis      Sleep apnea     CPAP     SVT (supraventricular tachycardia) (H24)      Uterine fibroid        CURRENT MEDICATIONS:  Current Outpatient Medications   Medication Sig Dispense Refill     apixaban ANTICOAGULANT (ELIQUIS ANTICOAGULANT) 5 MG tablet Take 1 tablet (5 mg) by mouth 2 times daily 180 tablet 3     digoxin (LANOXIN) 250 MCG tablet Take 1 tablet (0.25 mg) by mouth daily 90 tablet 3     eszopiclone (LUNESTA) 2 MG tablet Take 2 mg by mouth nightly as needed for sleep       Glucosamine Sulfate 1500 MG PACK Take 1,500 mg by mouth daily       Lactobacillus Rhamnosus, GG, ( PROBIOTIC DIGESTIVE CARE) CAPS Take 1 capsule by mouth daily       losartan (COZAAR) 100 MG tablet TAKE 1 TABLET BY MOUTH EVERY DAY for 90 days       MAGNESIUM OXIDE PO Take 400 mg by mouth daily       metoprolol succinate ER (TOPROL XL) 25 MG 24 hr tablet Take 0.5 tablets (12.5 mg) by mouth daily 60 tablet 3     MULTIPLE VITAMIN PO daily       omeprazole (PRILOSEC) 40 MG DR capsule Take 40 mg by mouth every morning       polyethylene glycol (MIRALAX/GLYCOLAX) packet Take 1 packet by mouth daily       ROSUVASTATIN CALCIUM PO Take 10 mg by mouth daily       saline 0.65 % SOLN Spray 1 spray in nostril       venlafaxine (EFFEXOR XR) 37.5 MG 24 hr capsule Take 1 capsule (37.5 mg) by mouth daily 90 capsule 3     calcium-vitamin D (CALTRATE) 600-400 MG-UNIT per tablet Take 1 tablet by mouth 2 times daily         ALLERGIES:     Allergies   Allergen Reactions     Erythromycin      Metoprolol Nausea     Seasonal Allergies      Other Reaction(s): Unknown     Sulfamethoxazole-Trimethoprim Hives     Sulfa Antibiotics Rash       FAMILY HISTORY:  Family History   Problem Relation Age of Onset     Macular Degeneration Father      Glaucoma Father      Glaucoma Paternal Grandmother      Diabetes Mother      Hypertension Mother      Hypertension Father      Hyperlipidemia Father      No  "family history of sudden death.    Mom: CHF  Paternal GF:  from \"heart issues\"   Sister: Afib     SOCIAL HISTORY:  Social History     Tobacco Use     Smoking status: Never     Smokeless tobacco: Never   Vaping Use     Vaping status: Never Used   Substance Use Topics     Alcohol use: Not Currently     Drug use: No       ROS:   A comprehensive 14 point review of systems is negative other than as mentioned in HPI.    Exam:  BP (!) 147/75 (BP Location: Right arm, Patient Position: Supine, Cuff Size: Adult Regular)   Pulse 72   Ht 1.753 m (5' 9\")   Wt 72.5 kg (159 lb 12.8 oz)   SpO2 93%   BMI 23.60 kg/m    GENERAL APPEARANCE: healthy, alert and no distress  EYES: no icterus, no xanthelasmas  ENT: normal palate, mucosa moist, no central cyanosis  NECK: JVP not elevated  RESPIRATORY: lungs clear to auscultation - no rales, rhonchi or wheezes, no use of accessory muscles, no retractions, respirations are unlabored, normal respiratory rate  CARDIOVASCULAR: regular rhythm, normal S1 with physiologic split S2, no S3 or S4 and no murmur, click or rub.  EXTREMITIES: no edema  NEURO: alert and oriented to person/place/time, normal speech  SKIN: no ecchymoses, no rashes.  PSYCH: cooperative, affect appropriate.     Labs:  Reviewed.     Testing/Procedures:    I personally visualized and interpreted:    2024 Zio   3 runs of nonsustained VT, 1 of which is associated with symptoms.   Runs of nonsustained SVT.  Symptomatic episodes corresponded to sinus rhythm with PACs, PVCs, or a run of nonsustained VT.    3/20/24 CMR  1.  Normal left ventricular size.  Severe asymmetric left ventricular hypertrophy.  The maximal thickness  of basal anteroseptal wall is 1.7 cm.  The thickness of posterolateral wall is 1.2 cm. There is obvious  accelerated LVOT flow velocity indicating some obstruction.  However, there is no anterior septal motion of  mitral valve. There is no rest perfusion defect.  The calculated left ventricular " ejection fraction is 62%.  2.  No prior myocardial infarction, myocardial scar or other infiltrative process.  T1 mapping value is  mildly elevated to 1016 ms (normal reference less than 1000 ms).  3.  Normal right ventricular size and systolic function.  4.  Mild to moderate mitral and tricuspid valve regurgitation.    1/19/24 TTE  Left ventricular size, wall motion and function are normal. The ejection  fraction is 60-65%.  Normal right ventricle size and systolic function.  The left atrium is mildly dilated.  Mild to moderate valvular aortic stenosis.  IVC diameter and respiratory changes fall into an intermediate range  suggesting an RA pressure of 8 mmHg.    1/12/23 TTE   Left ventricular size, wall motion and function are normal. The ejection  fraction is 60-65%.  Normal right ventricle size and systolic function.  The left atrium is mildly dilated.  Mild to moderate valvular aortic stenosis.  IVC diameter and respiratory changes fall into an intermediate range  suggesting an RA pressure of 8 mmHg.    1/25/22 Stress Echo    Interpretation Summary  1. Normal stress echocardiogram without evidence of stress induced ischemia.  2. Normal resting LV systolic performance with an ejection fraction of 55-60%.  There is normal improvement in left ventricular systolic performance with a  peak ejection fraction of 70-75%.  3. No ECG evidence of ischemia.  4. No anginal chest pain reported with exercise.  5. Average functional capacity for age.  6. Two PVCs and a solitary couplet were noted. No other rhythm disturbances  detected.    Assessment and Plan:   The patient was counseled at length regarding the nature of hypertrophic cardiomyopathy including its genetic nature, its natural history, and potential complications.     We discussed the potential complications of HCM, including outflow obstruction, arrhythmias, and heart failure.  She has  LVOT obstruction(gradient 54 mm; calculated from MR doppler tracing). The  patient was counseled regarding behavioral interventions to avoid worsening outflow obstruction, in particular avoiding dehydration and minimizing diuretics and peripheral vasodilators.      She has no identified high-risk features for sudden death. 5% MID myocardial In particular, she does not have significant LGE burden on his cardiac MRI (<5% mid myocardial LGE). Maximal LV wall thickness is 1.7 cm. She also has no family history of SCD. ZioPatch showed 3 runs of  did not show any ventricular arrhythmias. She has no history of unexplained syncope.      She does not have any clear evidence of functional limitation. We discussed the activity recommendations for hypertrophic cardiomyopathy, and in particular that moderate intensity aerobic exercise is encouraged and that only the highest intensity competitive sports with start-stop activity (e.g. basketball and ice hockey) are felt to be contraindicated.      We also discussed the genetic nature of HCM and its generally autosomal dominant inheritance. We counseled that all 1st-degree relatives should be screened and explained the strategies of clinical screening and genetic testing. The patient is aware that a pathogentic mutation is only identified in 40-50% of patients, and that in the absence of an identified mutation that would allow cascade testing of family members, that all first-degree relatives should undergo clinical screening.      Lastly we talked about rhythm control strategies for her aflutter including CHAITANYA + cardioversion and ablation. She would like to think about this further before deciding.    1.HCM   2. Aflutter [id number]=0 age (2), F, HTN)    Plan in brief   Referral to genetic counseling   Continue Toprol 12.5 daily   TFTs    Start Eliquis 5 BID.   Follow-up in 6 weeks with GINO for Mavacamtam, vs EtOH ablation vs dysopyramide.     Follow-up TBD based on the above testing.     The patient states understanding and is agreeable with plan.      Sidney Herrmann   Cardiology Fellow  This note was created using Dragon dictation software, so please excuse any mistakes and incorrect syntax and semantics.      Guy Kinsey MD  Cardiology    CC  NIALL ROJAS         Attestation signed by Guy Kinsey MD at 6/11/2024  3:50 PM:  Physician Attestation   I, Guy Kinsey MD, saw this patient and agree with the findings and plan of care as documented in the note.      Items personally reviewed/procedural attestation: vitals, labs, imaging and agree with the interpretation documented in the note, and EKG and agree with the interpretation documented in the note.    oHCM/HOCM, LVOT gradient ~63 mmHg with NYHA class II-III exertional dyspnea (insidiously worsening over the past several years)  Paroxsysmal atrial flutter with 4:1 block, new  Nonsustained VT  -start DOAC  -we will contact her regarding her decision re: management of A.flutter and LVOTO  -CHAITANYA/cardioversion (if she is agreeable)  -EP follow up after TTE/cardioversion for atrial flutter  -follow up with Dacia Ansari NP re: management of symptomatic LVOTO-- options include mavacamten, alcohol septal ablation, and disopyramide  -TTE prior to GINO follow up  -follow up with brenda CARTAGENA based on the above  -genetic counseling referral    I spent a total of 67 minutes on the day of the visit.   Time spent by me doing chart review, history and exam, documentation and further activities per the note  The longitudinal plan of care for the diagnosis(es)/condition(s) as documented were addressed during this visit. Due to the added complexity in care, I will continue to support Neelima in the subsequent management and with ongoing continuity of care.    Guy Kinsey MD  Cardiology      Please do not hesitate to contact me if you have any questions/concerns.     Sincerely,     Guy Kinsey MD

## 2024-06-11 NOTE — NURSING NOTE
New Medication: Patient was educated regarding newly prescribed medication, including discussion of  the indication, administration, side effects, and when to report to MD or RN. Patient demonstrated understanding of this information and agreed to call with further questions or concerns.Start Eliquis 5 mg twice daily.     Return Appointment: Patient given instructions regarding scheduling next clinic visit. Patient demonstrated understanding of this information and agreed to call with further questions or concerns.Please let us know if you would like to proceed with the Cardioversion with transesophageal echocardiogram (CHAITANYA). Schedule visit with Genetic counselor, Paige Jama.  Follow up with Dr. Bowser after DCCV with CHAITANYA. Follow up with Dacia Ansari in 6 weeks after (DCCV). Follow up with Dr. Kinsey after completion of DCCV and genetic counseling.     Patient stated she understood all health information given and agreed to call with further questions or concerns.     Mark Shelley RN

## 2024-06-12 LAB
ATRIAL RATE - MUSE: 285 BPM
DIASTOLIC BLOOD PRESSURE - MUSE: NORMAL MMHG
INTERPRETATION ECG - MUSE: NORMAL
P AXIS - MUSE: 127 DEGREES
PR INTERVAL - MUSE: NORMAL MS
QRS DURATION - MUSE: 108 MS
QT - MUSE: 386 MS
QTC - MUSE: 434 MS
R AXIS - MUSE: -37 DEGREES
SYSTOLIC BLOOD PRESSURE - MUSE: NORMAL MMHG
T AXIS - MUSE: 146 DEGREES
VENTRICULAR RATE- MUSE: 76 BPM

## 2024-06-19 ENCOUNTER — CARE COORDINATION (OUTPATIENT)
Dept: CARDIOLOGY | Facility: CLINIC | Age: 79
End: 2024-06-19
Payer: MEDICARE

## 2024-06-19 DIAGNOSIS — R06.09 DOE (DYSPNEA ON EXERTION): ICD-10-CM

## 2024-06-19 DIAGNOSIS — I42.1 HOCM (HYPERTROPHIC OBSTRUCTIVE CARDIOMYOPATHY) (H): ICD-10-CM

## 2024-06-19 DIAGNOSIS — I48.92 ATRIAL FLUTTER, UNSPECIFIED TYPE (H): ICD-10-CM

## 2024-06-19 DIAGNOSIS — Q24.8 LEFT VENTRICULAR OUTFLOW TRACT OBSTRUCTION: ICD-10-CM

## 2024-06-19 DIAGNOSIS — I47.10 SVT (SUPRAVENTRICULAR TACHYCARDIA) (H): ICD-10-CM

## 2024-06-19 RX ORDER — SODIUM CHLORIDE 9 MG/ML
INJECTION, SOLUTION INTRAVENOUS CONTINUOUS
Status: ACTIVE | OUTPATIENT
Start: 2024-06-19

## 2024-06-19 RX ORDER — METOPROLOL SUCCINATE 25 MG/1
12.5 TABLET, EXTENDED RELEASE ORAL DAILY
Qty: 60 TABLET | Refills: 1 | Status: SHIPPED | OUTPATIENT
Start: 2024-06-19

## 2024-06-19 NOTE — PROGRESS NOTES
Called Neelima. Informed patient that prescription for Eliquis is currently being processed. Reviewed pre and pro procedure instructions for cardioversion. Patient verbalizes understanding and agreement with information and instruction reviewed. Patient will start Eliquis 5 mg twice daily.      Mark Shelley RN

## 2024-06-19 NOTE — PROGRESS NOTES
Spoke with Neelima. Patient would like Rx for Eliquis to be sent to [facility] Mail delivery, as her local [facility] was charging more for the medication.     Patient would also like to proceed with arranging cardioversion for atrial flutter. She would like to arrange for mid July is possible.     Called [facility]. Representative stated they have received the prescription and is currently being review by the Pharmacy team. This process can take up to 7-10 business days to be processed.

## 2024-06-19 NOTE — LETTER
6/19/2024      RE: Neelima Zuniga  [address]  [place] [address]       Dear Willie,      You are scheduled for a Cardioversion with Transesophageal Echocardiogram (CHAITANYA), at [facility]. The hospital is located at [address] [place] [address] [place].  If you need to cancel this procedure, please call [phone].       Visitor Policy: Two visitors.    Date:_July 17, 2024__Time: ___9:30 am____To the Gold Waiting Room at the [facility]  Cardioversion    Please do not eat anything for 8 hours prior to your procedure. You may have sips of water up until 2 hours prior to your arrival.  2. Medications to continue:  - Anticoagulant: Eliquis  - Take all meds as prescribed, except for those noted below.  3. Medications to hold:    - None   4. You will discharge same day. You will need a .    If you have further questions, please utilize [employer] to contact us.   If your question concerns the above instructions, contact:  Mark Shelley, RN, BSN, PHN  RN Care Coordinator  [facility]  Phone: [phone]  Fax: [phone]     If your question concerns the schedule/appointment times, contact:  [facility]   [phone]

## 2024-06-24 NOTE — PROGRESS NOTES
Here is a copy of the progress note from your recent genetic counseling visit to [facility] on Date: 2024.    PROGRESS NOTE:Neelima was referred by Guy Kinsey MD for genetic counseling due to her  history of hypertrophic cardiomyopathy (HCM).  I had the opportunity to talk with Neelima  today to discuss the genetic component of HCM and testing options available to her .     MEDICAL HISTORY:Neelima was recently diagnosed with HCM.  Cardiac MRI on 3/20/24 showed severe asymmetric left ventricular hypertrophy (max thickness 1.7 cm), accelerated LVOT flow velocity indicating some obstruction, and calculated left ventricular ejection fraction is 62%.  She reports some history of exertional dyspnea.  History is also remarkable for AFlutter, NSVT,     FAMILY HISTORY:A detailed family history was obtained today and was significant for the following cardiac history:    Mother  at 92 years with pneumonia.  She was diagnosed with heart failure in her 80's.  Maternal aunt  in her 90's.    Maternal grandmother  80's.  Grandfather left the family early.  No more information known.  Father  at 96 years.  History of heart attack and bypass surgery in his 80's.  Death certificate states cardiomyopathy.  Paternal uncle  90 with heart issues.  Paternal grandfather  70 with heart issues.  Grandmother  at 73 years with history of blindness.  Sister (78) had a pulmonary embolism at 70 years; her son had a PE at 50 years.  Sister (76) has Afib and history of blood clot.  Brother (70) has hip issues.  Daughter  at 39 years with cerebral palsy and seizure disorder.  There is no additional history of cardiomyopathy, arrhythmias, heart attacks, fainting, sudden cardiac death, genetic conditions, or birth defects. (Scanned pedigree may be under media tab)    DISCUSSION:  Reviewed definition of hypertrophic cardiomyopathy (HCM). Explained that a good  portion of HCM cases have a genetic component.     Reviewed autosomal dominant (AD) inheritance pattern most commonly associated with HCM, including the 50% risk for recurrence. Briefly reviewed autosomal recessive and X-linked inheritance. Explained that HCM gene mutations are associated with reduced penetrance and variable expressivity, meaning that individuals who carry a gene mutation may or may not get the disease and onset and severity can vary from one family member to the next. This explains why you may not see cardiomyopathy in each generation of a family.     Currently over 30 genes have been found to be related to HCM. Genetic testing currently identifies mutations in about 50-60% of idiopathic cases. Reviewed capabilities, limitations, and logistics of testing.  DNA sample via saliva or blood is collected and sent to testing lab for evaluation of selected genes. The results could directly impact care and treatment.      Explained three possible outcomes of genetic testing including: positive identification of a mutation, no mutation identified, and identification of a variant of unknown significance (VUS). If a mutation is identified, presymptomatic testing would be available to at risk family members. If no mutation is identified, it does not rule out the possibility of a genetic component to this disease. Family members could still be at risk for developing the same condition. If a VUS is identified, it is unclear if the mutation is disease causing or just a normal variation. It may take time and possibly additional testing to determine the meaning of a VUS result.     Test results take approximately 2-4 weeks on average. Discussed cost of testing through commercial labs. Explained that the lab will work with insurance to determine coverage and contact patient if out of pocket costs are expected to exceed $100. If so, patient has the option to pay reported price, cancel testing or elect self pay  pricing (typcially around $250).     Discussed pros and cons of genetic testing. Explained that results could determine the cause for HCM. If a mutation is identified, presymptomatic testing is available to all at risk relatives. Reviewed possible issues associated with presymptomatic testing including genetic discrimination, current laws to prevent discrimination (ie. RON), insurance issues, and emotional and psychosocial outcomes of testing.     Recommend clinical evaluation for all first degree relatives (parents, siblings, and children) of an affected individual regardless of decision to pursue genetic testing. Based on the Heart Failure Society of [place] Practice Guidelines (Rubi et al, 2018), clinical evaluation should be performed at least every 3 years, except yearly during puberty, and should include history, cardiac exam, ECHO, EKG, Holter monitoring, and exercise treadmill.    All questions answered at this time.     PLAN:Neelima is uncertain if she wants to proceed with genetic testing at this time.  She would like to talk with her family before making her decision.  She will contact me if she decides to proceed with genetic testing.    TOTAL TIME SPENT IN COUNSELIN minutes    Paige Jama MS, [facility]  Licensed, Certified Genetic Counselor  [facility]

## 2024-06-25 ENCOUNTER — OFFICE VISIT (OUTPATIENT)
Dept: CARDIOLOGY | Facility: CLINIC | Age: 79
End: 2024-06-25
Attending: GENETIC COUNSELOR, MS
Payer: MEDICARE

## 2024-06-25 DIAGNOSIS — I42.1 HOCM (HYPERTROPHIC OBSTRUCTIVE CARDIOMYOPATHY) (H): ICD-10-CM

## 2024-06-25 DIAGNOSIS — Q24.8 LEFT VENTRICULAR OUTFLOW TRACT OBSTRUCTION: ICD-10-CM

## 2024-06-25 PROCEDURE — 96040 HC GENETIC COUNSELING, EACH 30 MINUTES: CPT | Performed by: GENETIC COUNSELOR, MS

## 2024-06-25 NOTE — LETTER
2024      RE: Neelima Zuniga  [address]  [place] [address]       Dear Colleague,    Thank you for the opportunity to participate in the care of your patient, Neelima Zuniga, at the [facility] [place] [facility]. Please see a copy of my visit note below.    Here is a copy of the progress note from your recent genetic counseling visit to the [facility] on Date: 2024.    PROGRESS NOTE:Neelima was referred by Guy Knisey MD for genetic counseling due to her  history of hypertrophic cardiomyopathy (HCM).  I had the opportunity to talk with Neelima  today to discuss the genetic component of HCM and testing options available to her .     MEDICAL HISTORY:Neelima was recently diagnosed with HCM.  Cardiac MRI on 3/20/24 showed severe asymmetric left ventricular hypertrophy (max thickness 1.7 cm), accelerated LVOT flow velocity indicating some obstruction, and calculated left ventricular ejection fraction is 62%.  She reports some history of exertional dyspnea.  History is also remarkable for AFlutter, NSVT,     FAMILY HISTORY:A detailed family history was obtained today and was significant for the following cardiac history:    Mother  at 92 years with pneumonia.  She was diagnosed with heart failure in her 80's.  Maternal aunt  in her 90's.    Maternal grandmother  80's.  Grandfather left the family early.  No more information known.  Father  at 96 years.  History of heart attack and bypass surgery in his 80's.  Death certificate states cardiomyopathy.  Paternal uncle  90 with heart issues.  Paternal grandfather  70 with heart issues.  Grandmother  at 73 years with history of blindness.  Sister (78) had a pulmonary embolism at 70 years; her son had a PE at 50 years.  Sister (76) has Afib and history of blood clot.  Brother (70) has hip issues.  Daughter   at 39 years with cerebral palsy and seizure disorder.  There is no additional history of cardiomyopathy, arrhythmias, heart attacks, fainting, sudden cardiac death, genetic conditions, or birth defects. (Scanned pedigree may be under media tab)    DISCUSSION:  Reviewed definition of hypertrophic cardiomyopathy (HCM). Explained that a good portion of HCM cases have a genetic component.     Reviewed autosomal dominant (AD) inheritance pattern most commonly associated with HCM, including the 50% risk for recurrence. Briefly reviewed autosomal recessive and X-linked inheritance. Explained that HCM gene mutations are associated with reduced penetrance and variable expressivity, meaning that individuals who carry a gene mutation may or may not get the disease and onset and severity can vary from one family member to the next. This explains why you may not see cardiomyopathy in each generation of a family.     Currently over 30 genes have been found to be related to HCM. Genetic testing currently identifies mutations in about 50-60% of idiopathic cases. Reviewed capabilities, limitations, and logistics of testing.  DNA sample via saliva or blood is collected and sent to testing lab for evaluation of selected genes. The results could directly impact care and treatment.      Explained three possible outcomes of genetic testing including: positive identification of a mutation, no mutation identified, and identification of a variant of unknown significance (VUS). If a mutation is identified, presymptomatic testing would be available to at risk family members. If no mutation is identified, it does not rule out the possibility of a genetic component to this disease. Family members could still be at risk for developing the same condition. If a VUS is identified, it is unclear if the mutation is disease causing or just a normal variation. It may take time and possibly additional testing to determine the meaning of a VUS result.      Test results take approximately 2-4 weeks on average. Discussed cost of testing through commercial labs. Explained that the lab will work with insurance to determine coverage and contact patient if out of pocket costs are expected to exceed $100. If so, patient has the option to pay reported price, cancel testing or elect self pay pricing (typcially around $250).     Discussed pros and cons of genetic testing. Explained that results could determine the cause for HCM. If a mutation is identified, presymptomatic testing is available to all at risk relatives. Reviewed possible issues associated with presymptomatic testing including genetic discrimination, current laws to prevent discrimination (ie. RON), insurance issues, and emotional and psychosocial outcomes of testing.     Recommend clinical evaluation for all first degree relatives (parents, siblings, and children) of an affected individual regardless of decision to pursue genetic testing. Based on the Heart Failure Society of [place] Practice Guidelines (Rubi et al, 2018), clinical evaluation should be performed at least every 3 years, except yearly during puberty, and should include history, cardiac exam, ECHO, EKG, Holter monitoring, and exercise treadmill.    All questions answered at this time.     PLAN:Neelima is uncertain if she wants to proceed with genetic testing at this time.  She would like to talk with her family before making her decision.  She will contact me if she decides to proceed with genetic testing.    TOTAL TIME SPENT IN COUNSELIN minutes    Paige Jama MS, [facility]  Licensed, Certified Genetic Counselor  [facility]

## 2024-06-26 ENCOUNTER — TELEPHONE (OUTPATIENT)
Dept: CARDIOLOGY | Facility: CLINIC | Age: 79
End: 2024-06-26
Payer: MEDICARE

## 2024-06-28 NOTE — PATIENT INSTRUCTIONS
"Indication for Genetic Counseling:     Hypertrophic cardiomyopathy (HCM) is a condition that causes part of the heart muscle (the left ventricle) to become thick and enlarged (hypertrophy).  It is usually diagnosed by echocardiogram (ECHO) or cardiac magnetic resonance imaging (MRI).  When the heart becomes enlarged, it cannot pump blood as effectively, which can lead to symptoms such as shortness of breath, fatigue, chest pains, irregular heartbeat, fainting, stroke, cardiac arrest, and sudden cardiac death (SCD).  HCM can be caused by a variety of factors, such as chronic hypertension, a narrow aorta, athletic heart, or genetics.  There are at least 20 known genes that, when not working properly, can cause HCM.    Inheritance:   Humans have over 20,000 genes that instruct our bodies how to function.  We have two copies of each gene because we inherit one from our mother and one from our father.  In most cardiac cases with a genetic component, the condition is inherited in an autosomal dominant (AD) pattern.  This means that in order to have the condition, a person needs to inherit a mutation on one copy of a particular gene.  This mutation or pathogenic variant dominates the \"normal\" working copy of the gene.  When an affected individual has children, they can either pass on the \"normal\" copy of the gene or the mutation.  Therefore, children have a 50% chance of inheriting the mutation.  Other family members also have an increased risk but the specific risk depends on the degree of relationship.  Additional inheritance patterns can occur within families and may alter the risk of recurrence.     Testing Options:   Genetic testing is available to assess a panel of genes known to cause this condition.  This test reads through the DNA (sequencing) of these genes to look for spelling mistakes or mutations that could cause the condition.      There are three types of results you could receive from this test.     " -Positive result (mutation identified) - confirms diagnosis and provides an answer to why this happened.  In addition, identifying a mutation allows family members to have testing to determine their risk.     -Negative result (mutation not identified) - no genetic changes were identified.  This does not rule out a genetic cause for the condition as the genetic testing only identifies 50-60% of genetic causes for this condition.    -Variant of uncertain significance (VUS) - a genetic change was identified, but there is not enough information to determine whether it is disease-causing or normal human genetic variation.     Although genetic testing may identify a mutation, it cannot provide information about the severity of symptoms or the progression of disease.  We cannot predict age of onset or severity of symptoms due to reduced penetrance and variable expressivity.    Logistics:   Genetic testing involves collecting a sample of DNA, thru blood, saliva, or cheek cells.  The sample will be sent to a laboratory to extract the DNA and sequence the genes for mutations.  The laboratory will work with your insurance company to determine the out of pocket (OOP) cost and will notify you if the OOP cost is greater than $100.  Remember to ask the lab about financial assistance pricing and self pay options as well.  Sometimes those are much lower than insurance pricing.  When testing is initiated, results take about 2-4 weeks to return. I will contact you over the phone when results are available.     Genetic Information and Nondiscrimination Act:  The Genetic Information and Nondiscrimination Act of 2008 (RON) is a federal law that protects individuals from genetic discrimination in health insurance and employment. Genetic discrimination is defined as the misuse of genetic information. This law does not address potential discrimination regarding life insurance or disability insurance.      This is especially relevant for at  risk individuals who are considering presymptomatic testing.    Screening Recommendations:  Recommend clinical evaluation for all first degree relatives (parents, siblings, and children) of an affected individual regardless of decision to pursue genetic testing.  Clinical evaluation should be performed at least every 3 years, except yearly during puberty, and should include history, cardiac exam, ECHO, EKG, Holter monitoring, and exercise treadmill.    Resources:  Hypertrophic Cardiomyopathy Association - 4hcm.org  Children's Cardiomyopathy Foundation - childrenscardiomyopathy.org  [place] Cardiomyopathy Association - cardiomyopathy.org  HCM Care phone luna    General   American Heart Association - americanheart.org  Genetics Home Reference - ghr.AMVONET.nih.gov  Genetic Information and Nondiscrimination Act - ginahelp.org    Contact Information:  Paige Jama MS  Licensed Genetic Counselor  [facility]  [facility]    Office:  [phone]  Appointments:  [phone]  Fax: [phone]  Email: [email or web address]

## 2024-07-03 ENCOUNTER — TELEPHONE (OUTPATIENT)
Dept: CARDIOLOGY | Facility: CLINIC | Age: 79
End: 2024-07-03
Payer: MEDICARE

## 2024-07-03 NOTE — TELEPHONE ENCOUNTER
Pt called wanting to speak with JOCE Flores in regards to her Eliquis that she was prescribed by Dr. Kinsey. Pt stated that she was not able to start the Eliquis until 6/29 as she was having insurance issues. She wants to know if she would have been on the medication long enough for her to proceed with her cardioversion on 7/17.

## 2024-07-05 NOTE — TELEPHONE ENCOUNTER
Spoke with Neelima. Discussed with patient she is okay to proceed with cardioversion as scheduled for 7/17/24 since patient is having a CHAITANYA on the same day. Reviewed pre and post procedure instructions with patient. Patient requests a letter be sent to with information and a map.     You are scheduled for a Cardioversion with Transesophageal Echocardiogram (CHAITANYA), at [facility]. The hospital is located at [address] [place] [address] on the [place] of the [place].  If you need to cancel this procedure, please call [phone].       Visitor Policy: Two visitors.     Date:_July 17, 2024__Time: ___11:30 am____To the Gold Waiting Room at the [facility]  Cardioversion     Please do not eat anything for 8 hours prior to your procedure. You may have sips of water up until 2 hours prior to your arrival.  2. Medications to continue:  - Anticoagulant: Eliquis  - Take all meds as prescribed, except for those noted below.  3. Medications to hold:               - None   4. You will discharge same day. You will need a .    Mark Shelley RN

## 2024-07-07 ENCOUNTER — HEALTH MAINTENANCE LETTER (OUTPATIENT)
Age: 79
End: 2024-07-07

## 2024-07-17 ENCOUNTER — ANESTHESIA EVENT (OUTPATIENT)
Dept: SURGERY | Facility: CLINIC | Age: 79
End: 2024-07-17
Payer: MEDICARE

## 2024-07-17 ENCOUNTER — APPOINTMENT (OUTPATIENT)
Dept: LAB | Facility: CLINIC | Age: 79
End: 2024-07-17
Attending: INTERNAL MEDICINE
Payer: MEDICARE

## 2024-07-17 ENCOUNTER — ANESTHESIA (OUTPATIENT)
Dept: SURGERY | Facility: CLINIC | Age: 79
End: 2024-07-17
Payer: MEDICARE

## 2024-07-17 ENCOUNTER — APPOINTMENT (OUTPATIENT)
Dept: CARDIOLOGY | Facility: CLINIC | Age: 79
End: 2024-07-17
Attending: INTERNAL MEDICINE
Payer: MEDICARE

## 2024-07-17 ENCOUNTER — APPOINTMENT (OUTPATIENT)
Dept: MEDSURG UNIT | Facility: CLINIC | Age: 79
End: 2024-07-17
Attending: INTERNAL MEDICINE
Payer: MEDICARE

## 2024-07-17 ENCOUNTER — HOSPITAL ENCOUNTER (OUTPATIENT)
Facility: CLINIC | Age: 79
Discharge: HOME | End: 2024-07-18
Attending: INTERNAL MEDICINE | Admitting: INTERNAL MEDICINE
Payer: MEDICARE

## 2024-07-17 ENCOUNTER — HOSPITAL ENCOUNTER (OUTPATIENT)
Dept: CARDIOLOGY | Facility: CLINIC | Age: 79
Discharge: HOME | End: 2024-07-17
Attending: INTERNAL MEDICINE
Payer: MEDICARE

## 2024-07-17 VITALS
DIASTOLIC BLOOD PRESSURE: 74 MMHG | RESPIRATION RATE: 16 BRPM | SYSTOLIC BLOOD PRESSURE: 145 MMHG | OXYGEN SATURATION: 97 % | HEART RATE: 89 BPM

## 2024-07-17 VITALS
HEART RATE: 102 BPM | DIASTOLIC BLOOD PRESSURE: 78 MMHG | OXYGEN SATURATION: 99 % | RESPIRATION RATE: 16 BRPM | SYSTOLIC BLOOD PRESSURE: 145 MMHG

## 2024-07-17 DIAGNOSIS — I42.1 HOCM (HYPERTROPHIC OBSTRUCTIVE CARDIOMYOPATHY) (H): ICD-10-CM

## 2024-07-17 DIAGNOSIS — I48.92 ATRIAL FLUTTER, UNSPECIFIED TYPE (H): ICD-10-CM

## 2024-07-17 DIAGNOSIS — Q24.8 LEFT VENTRICULAR OUTFLOW TRACT OBSTRUCTION: ICD-10-CM

## 2024-07-17 LAB
LVEF ECHO: NORMAL
MAGNESIUM SERPL-MCNC: 2.4 MG/DL (ref 1.7–2.3)
POTASSIUM SERPL-SCNC: 4.7 MMOL/L (ref 3.4–5.3)

## 2024-07-17 PROCEDURE — 92960 CARDIOVERSION ELECTRIC EXT: CPT | Performed by: ANESTHESIOLOGY

## 2024-07-17 PROCEDURE — 250N000011 HC RX IP 250 OP 636: Performed by: INTERNAL MEDICINE

## 2024-07-17 PROCEDURE — 92960 CARDIOVERSION ELECTRIC EXT: CPT

## 2024-07-17 PROCEDURE — 258N000003 HC RX IP 258 OP 636

## 2024-07-17 PROCEDURE — 93325 DOPPLER ECHO COLOR FLOW MAPG: CPT | Mod: 26 | Performed by: INTERNAL MEDICINE

## 2024-07-17 PROCEDURE — 99100 ANES PT EXTEME AGE<1 YR&>70: CPT

## 2024-07-17 PROCEDURE — 99207 PR NO BILLABLE SERVICE THIS VISIT: CPT

## 2024-07-17 PROCEDURE — 999N000054 HC STATISTIC EKG NON-CHARGEABLE

## 2024-07-17 PROCEDURE — 250N000009 HC RX 250: Performed by: INTERNAL MEDICINE

## 2024-07-17 PROCEDURE — 84132 ASSAY OF SERUM POTASSIUM: CPT | Performed by: INTERNAL MEDICINE

## 2024-07-17 PROCEDURE — 36415 COLL VENOUS BLD VENIPUNCTURE: CPT | Performed by: INTERNAL MEDICINE

## 2024-07-17 PROCEDURE — 250N000009 HC RX 250

## 2024-07-17 PROCEDURE — 370N000017 HC ANESTHESIA TECHNICAL FEE, PER MIN

## 2024-07-17 PROCEDURE — 83735 ASSAY OF MAGNESIUM: CPT | Performed by: INTERNAL MEDICINE

## 2024-07-17 PROCEDURE — 93312 ECHO TRANSESOPHAGEAL: CPT | Mod: 26 | Performed by: INTERNAL MEDICINE

## 2024-07-17 PROCEDURE — 93325 DOPPLER ECHO COLOR FLOW MAPG: CPT

## 2024-07-17 PROCEDURE — 99100 ANES PT EXTEME AGE<1 YR&>70: CPT | Performed by: ANESTHESIOLOGY

## 2024-07-17 PROCEDURE — 93005 ELECTROCARDIOGRAM TRACING: CPT

## 2024-07-17 PROCEDURE — 93320 DOPPLER ECHO COMPLETE: CPT | Mod: 26 | Performed by: INTERNAL MEDICINE

## 2024-07-17 RX ORDER — LIDOCAINE HYDROCHLORIDE 20 MG/ML
15 SOLUTION OROPHARYNGEAL ONCE
Status: COMPLETED | OUTPATIENT
Start: 2024-07-17 | End: 2024-07-17

## 2024-07-17 RX ORDER — NALOXONE HYDROCHLORIDE 0.4 MG/ML
0.4 INJECTION, SOLUTION INTRAMUSCULAR; INTRAVENOUS; SUBCUTANEOUS
Status: DISCONTINUED | OUTPATIENT
Start: 2024-07-17 | End: 2024-07-18 | Stop reason: HOSPADM

## 2024-07-17 RX ORDER — NALOXONE HYDROCHLORIDE 0.4 MG/ML
0.2 INJECTION, SOLUTION INTRAMUSCULAR; INTRAVENOUS; SUBCUTANEOUS
Status: DISCONTINUED | OUTPATIENT
Start: 2024-07-17 | End: 2024-07-18 | Stop reason: HOSPADM

## 2024-07-17 RX ORDER — SODIUM CHLORIDE 9 MG/ML
1000 INJECTION, SOLUTION INTRAVENOUS CONTINUOUS
Status: DISCONTINUED | OUTPATIENT
Start: 2024-07-17 | End: 2024-07-18 | Stop reason: HOSPADM

## 2024-07-17 RX ORDER — ACYCLOVIR 200 MG/1
9.5 CAPSULE ORAL
Status: DISCONTINUED | OUTPATIENT
Start: 2024-07-17 | End: 2024-07-18 | Stop reason: HOSPADM

## 2024-07-17 RX ORDER — METHOHEXITAL IN WATER/PF 100MG/10ML
SYRINGE (ML) INTRAVENOUS PRN
Status: DISCONTINUED | OUTPATIENT
Start: 2024-07-17 | End: 2024-07-17

## 2024-07-17 RX ORDER — FLUMAZENIL 0.1 MG/ML
0.2 INJECTION, SOLUTION INTRAVENOUS
Status: DISCONTINUED | OUTPATIENT
Start: 2024-07-17 | End: 2024-07-18 | Stop reason: HOSPADM

## 2024-07-17 RX ORDER — SODIUM CHLORIDE 9 MG/ML
INJECTION, SOLUTION INTRAVENOUS CONTINUOUS PRN
Status: DISCONTINUED | OUTPATIENT
Start: 2024-07-17 | End: 2024-07-17

## 2024-07-17 RX ORDER — FENTANYL CITRATE 50 UG/ML
25 INJECTION, SOLUTION INTRAMUSCULAR; INTRAVENOUS
Status: DISCONTINUED | OUTPATIENT
Start: 2024-07-17 | End: 2024-07-18 | Stop reason: HOSPADM

## 2024-07-17 RX ADMIN — FENTANYL CITRATE 25 MCG: 50 INJECTION, SOLUTION INTRAMUSCULAR; INTRAVENOUS at 12:42

## 2024-07-17 RX ADMIN — FENTANYL CITRATE 50 MCG: 50 INJECTION, SOLUTION INTRAMUSCULAR; INTRAVENOUS at 12:38

## 2024-07-17 RX ADMIN — LIDOCAINE HYDROCHLORIDE 15 ML: 20 SOLUTION ORAL; TOPICAL at 12:31

## 2024-07-17 RX ADMIN — MIDAZOLAM 0.5 MG: 1 INJECTION INTRAMUSCULAR; INTRAVENOUS at 12:40

## 2024-07-17 RX ADMIN — MIDAZOLAM 0.5 MG: 1 INJECTION INTRAMUSCULAR; INTRAVENOUS at 12:38

## 2024-07-17 RX ADMIN — Medication 50 MG: at 13:28

## 2024-07-17 RX ADMIN — TOPICAL ANESTHETIC 0.5 ML: 200 SPRAY DENTAL; PERIODONTAL at 12:31

## 2024-07-17 RX ADMIN — SODIUM CHLORIDE: 9 INJECTION, SOLUTION INTRAVENOUS at 13:28

## 2024-07-17 RX ADMIN — MIDAZOLAM 0.5 MG: 1 INJECTION INTRAMUSCULAR; INTRAVENOUS at 12:42

## 2024-07-17 ASSESSMENT — ACTIVITIES OF DAILY LIVING (ADL)
ADLS_ACUITY_SCORE: 35

## 2024-07-17 NOTE — PROGRESS NOTES
Pt arrived in ECHO department for scheduled CHAITANYA + DCCV.   Procedure explained, questions answered and consent signed. Discharge instructions discussed with patient.  Pt's throat sprayed at 1230, therefore pt will not be able to eat or drink until 2 hours after at 1430. Informed pt of this time and encouraged to start with warm fluids and soft foods.    Pt tolerated procedure well, and was given a total of 75 mcg IV fentanyl and 1.5 mg IV versed for conscious sedation. Pt denied throat or chest pain after CHAITANYA complete.   CHAITANYA probe 66 used for procedure.  No clots visualized on CHAITANYA so proceeded with DCCV. Anesthesia gave pt 50 mg IV brevitol for sedation and pt was DCCV at 120, 150, and 200 Joules to a SR. EKG completed.  Pt denied chest or throat pain after procedure and was D/C home after awake and VSS. Escorted out to front lobby by staff in w/c to meet pt's ride home.

## 2024-07-17 NOTE — PRE-PROCEDURE
GENERAL PRE-PROCEDURE:   Procedure:  CHAITANYA/DCCV  Date/Time:  7/17/2024 12:15 PM    Verbal consent obtained?: Yes    Written consent obtained?: Yes    Risks and benefits: Risks, benefits and alternatives were discussed    Consent given by:  Patient  Patient states understanding of procedure being performed: Yes    Patient's understanding of procedure matches consent: Yes    Procedure consent matches procedure scheduled: Yes    Expected level of sedation:  Moderate  Appropriately NPO:  Yes  ASA Class:  3  Mallampati  :  Grade 3- soft palate visible, posterior pharyngeal wall not visible  Lungs:  Lungs clear with good breath sounds bilaterally  Heart:  A-flutter  History & Physical reviewed:  History and physical reviewed and no updates needed  Statement of review:  I have reviewed the lab findings, diagnostic data, medications, and the plan for sedation

## 2024-07-17 NOTE — PROCEDURES
[facility]    Procedure: Cardioversion    Date/Time: 7/17/2024 3:56 PM    Performed by: Scarlett Shaw PA-C  Authorized by: Guy Kinsey MD      UNIVERSAL PROTOCOL   Site Marked: NA  Prior Images Obtained and Reviewed:  NA  Required items: Required blood products, implants, devices and special equipment available    Patient identity confirmed:  Verbally with patient  Patient was reevaluated immediately before administering moderate or deep sedation or anesthesia  Confirmation Checklist:  Patient's identity using two indicators, procedure was appropriate and matched the consent or emergent situation, correct equipment/implants were available and relevant allergies  Time out: Immediately prior to the procedure a time out was called    Universal Protocol: the Joint Commission Universal Protocol was followed       ANESTHESIA    Anesthesia was administered and monitored by anesthesiology.  See anesthesia documentation for details.    PROCEDURE DETAILS  Pre-procedure rhythm: atrial flutter  Patient position: patient was placed in a supine position  Chest area: chest area exposed  Electrodes: pads  Electrodes placed: anterior-posterior  Number of attempts: 3    Details of Attempts:  One, 120 J biphasic synchronized shock delivered with conversion from atrial flutter to atrial fibrillation.   Second, 150 J biphasic synchronized shock delivered without conversion.   Final, 200 J biphasic synchronized shock delivered with conversion to sinus rhythm.    Post-procedure rhythm: normal sinus rhythm      PROCEDURE    Patient Tolerance:  Patient tolerated the procedure well with no immediate complications      Scarlett Shaw PA-C  [facility]  Electrophysiology Consult Service  Pager: [phone]

## 2024-07-17 NOTE — DISCHARGE INSTRUCTIONS
GOING HOME AFTER YOUR TRANSESOPHAGEAL ECHOCARDIOGRAM AND CARDIOVERSION    FOR NEXT 24 HOURS:  An adult should stay with you.  Relax and take it easy.  DO NOT make any important legal decisions.  DO NOT drive or operate machines at home or at work.  DO NOT consume any alcohol today.  Resume your regular diet 2 HOURS after procedure @ __________ and drink plenty of fluids.  If your throat is sore, eat cold, bland, soft foods.  If you have any redness/skin sorness where the patches were placed, you may use aloe vera gel or 1% hydrocortisone cream to the skin (sold at drug stores)  Take Tylenol (Acetaminophen) per your provider's recommendations as needed to help relieve local pain.     CALL YOUR HEALTHCARE PROVIDER IF:  New pain or trouble swallowing  New stomach or chest pain  You develop nausea or vomiting  Fever above 100.6 F or greater  You develop hives or a rash or any unexplained itching  Have irregular heartbeat or fast pulse  Feel faint, dizzy, or lightheaded  Have chest pain with increased activity  Have bleeding issues from blood-thinning medicines (vomiting that looks like coffee grounds or contains blood OR black, bloody stools).    CALL 911 RIGHT AWAY IF YOU HAVE:  Pain in your chest, arm, shoulder, neck, or upper back  Shortness of breath  Loss of vision, speech, or strength or coordination in any body part  Weakness in your arm or leg  Uncontrolled bleeding  Feel unstable in any way     FOLLOW UP APPOINTMENT:    Follow up as scheduled with EP/Cardiology Provider     ADDITIONAL INFORMATION:    If you have any questions:        Call the Echo Lab Nurse at [phone], press 4 (Monday-Friday 7:00 am - 4:00 pm)        Call the hospital: [phone] and ask them to page [phone] (after 4:00 pm and on   weekends or holidays)      Cardiovascular Clinic:   [address] [place] [address]. [place] [address]  Your Care Team:  EP Cardiology   Telephone Number     Ruth Ayoub RN [phone]    After  business hours: [phone], select option 4, ask for EP Fellow on call to be paged.     For scheduling appts or procedures:    Alisa Wilkinson   [phone]   For the Device Clinic (Pacemakers and ICD's)   RN's  During business hours: [phone]     After business hours:   [phone]- select option 4 and ask for job code [id number].       As always, Thank you for trusting us with your health care needs!

## 2024-07-17 NOTE — ANESTHESIA CARE TRANSFER NOTE
Patient: Neelima Zuniga    Procedure: Procedure(s):  Anesthesia cardioversion @1400       Diagnosis: Atrial flutter (H) [I48.92]  Diagnosis Additional Information: No value filed.    Anesthesia Type:   MAC     Note:    Oropharynx: oropharynx clear of all foreign objects and spontaneously breathing  Level of Consciousness: awake  Oxygen Supplementation: nasal cannula  Level of Supplemental Oxygen (L/min / FiO2): 4  Independent Airway: airway patency satisfactory and stable    Vital Signs Stable: post-procedure vital signs reviewed and stable  Report to RN Given: handoff report given  Patient transferred to: Cardiac Special Care          Vitals:  Vitals Value Taken Time   /84    Temp     Pulse 94    Resp 16    SpO2 98%        Electronically Signed By: VALERIE Altamirano CRNA  July 17, 2024  1:34 PM

## 2024-07-17 NOTE — H&P
Electrophysiology Pre-Procedure History and Physical    Neelima Zuniga MRN# [id number]   Age: 79 year old YOB: 1945      Date of Procedure: 7/17/24 [facility]      Date of Exam 7/17/2024 Facility Same day       HPI:  Neelima Zuniga is a 79 year old female with past medical history significant for SVT previously on metoprolol, flecainide, and digoxin, hypertension, HOCM, and newly diagnosed atrial flutter. She was recently seen by Dr Ulloa 6/11/24 for LVOT obstruction on echo. At this visit, noted to be in atrial flutter, CHAITANYA/DCCV and ablation discussed. She was started on eliquis 5 mg bid, and wished to pursue cardioversion, for which she presents today.         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 11/29/2022     Priority: Medium    NDIAYE (dyspnea on exertion) 11/29/2022     Priority: Medium    Malignant neoplasm of right breast (H) 09/22/2016     Priority: Medium            Medications (include herbals and vitamins):      Current Outpatient Medications   Medication Sig Dispense Refill    apixaban ANTICOAGULANT (ELIQUIS ANTICOAGULANT) 5 MG tablet Take 1 tablet (5 mg) by mouth 2 times daily 180 tablet 3    calcium-vitamin D (CALTRATE) 600-400 MG-UNIT per tablet Take 1 tablet by mouth 2 times daily      digoxin (LANOXIN) 250 MCG tablet Take 1 tablet (0.25 mg) by mouth daily 90 tablet 3    eszopiclone (LUNESTA) 2 MG tablet Take 2 mg by mouth nightly as needed for sleep      Glucosamine Sulfate 1500 MG PACK Take 1,500 mg by mouth daily      Lactobacillus Rhamnosus, GG, (RA PROBIOTIC DIGESTIVE CARE) CAPS Take 1 capsule by mouth daily      losartan (COZAAR) 100 MG tablet TAKE 1 TABLET BY MOUTH EVERY DAY for 90 days      MAGNESIUM OXIDE PO Take 400 mg by mouth daily       metoprolol succinate ER (TOPROL XL) 25 MG 24 hr tablet Take 0.5 tablets (12.5 mg) by mouth daily 60 tablet 1    MULTIPLE VITAMIN PO daily      omeprazole (PRILOSEC) 40 MG DR capsule Take 40 mg by mouth every morning      polyethylene glycol (MIRALAX/GLYCOLAX) packet Take 1 packet by mouth daily      ROSUVASTATIN CALCIUM PO Take 10 mg by mouth daily      saline 0.65 % SOLN Spray 1 spray in nostril      venlafaxine (EFFEXOR XR) 37.5 MG 24 hr capsule Take 1 capsule (37.5 mg) by mouth daily 90 capsule 3     Current Facility-Administered Medications   Medication Dose Route Frequency Provider Last Rate Last Admin    sodium chloride 0.9 % infusion   Intravenous Continuous Guy Kinsey MD               Medication List         Notice    Cannot display patient medications because the patient has not yet been checked in.              Allergies:      Allergies   Allergen Reactions    Erythromycin     Metoprolol Nausea    Seasonal Allergies      Other Reaction(s): Unknown    Sulfamethoxazole-Trimethoprim Hives    Sulfa Antibiotics Rash             Social History:     Social History     Tobacco Use    Smoking status: Never    Smokeless tobacco: Never   Substance Use Topics    Alcohol use: Not Currently            Physical Exam:   All vitals have been reviewed  No data found.  No intake/output data recorded.  Airway assessment:   Patient is able to open mouth wide  Patient is able to stick out tongue}      ENT:   normocephalic, without obvious abnormality     Neck:   supple, symmetrical, trachea midline     Lungs:   No increased work of breathing, good air exchange, clear to auscultation bilaterally, no crackles or wheezing     Cardiovascular:   normal S1 and S2 and no edema             Lab / Radiology Results:     Lab Results   Component Value Date    WBC 7.0 06/11/2024    RBC 4.40 06/11/2024    HGB 13.0 06/11/2024    HCT 39.2 06/11/2024    MCV 89 06/11/2024    RDW 13.2 06/11/2024     06/11/2024      Lab  Results   Component Value Date    WBC 7.0 06/11/2024     Lab Results   Component Value Date     06/11/2024     Lab Results   Component Value Date    HGB 13.0 06/11/2024    HCT 39.2 06/11/2024     Lab Results   Component Value Date     06/11/2024    CO2 27 06/11/2024    CO2 28 04/06/2022    BUN 20.0 06/11/2024    BUN 14 04/06/2022     Lab Results   Component Value Date     06/11/2024    CO2 27 06/11/2024    CO2 28 04/06/2022    BUN 20.0 06/11/2024    BUN 14 04/06/2022     Lab Results   Component Value Date    TSH 1.88 12/21/2022    TSH 1.33 04/06/2022            Plan:   Patient's active problems diagnostically and therapeutically optimized for the planned procedure. Patient here for tilt tablet study. Procedure explained in detail to patient including indications, risks, and benefits. Patient states understanding and wishes to procced.       Scarlett Shaw PA-C  [facility]  Electrophysiology Consult Service  Pager: [phone]

## 2024-07-18 LAB
ATRIAL RATE - MUSE: 86 BPM
ATRIAL RATE - MUSE: NORMAL BPM
DIASTOLIC BLOOD PRESSURE - MUSE: NORMAL MMHG
DIASTOLIC BLOOD PRESSURE - MUSE: NORMAL MMHG
INTERPRETATION ECG - MUSE: NORMAL
INTERPRETATION ECG - MUSE: NORMAL
P AXIS - MUSE: 51 DEGREES
P AXIS - MUSE: NORMAL DEGREES
PR INTERVAL - MUSE: 202 MS
PR INTERVAL - MUSE: NORMAL MS
QRS DURATION - MUSE: 104 MS
QRS DURATION - MUSE: 122 MS
QT - MUSE: 370 MS
QT - MUSE: 414 MS
QTC - MUSE: 442 MS
QTC - MUSE: 544 MS
R AXIS - MUSE: -33 DEGREES
R AXIS - MUSE: -39 DEGREES
SYSTOLIC BLOOD PRESSURE - MUSE: NORMAL MMHG
SYSTOLIC BLOOD PRESSURE - MUSE: NORMAL MMHG
T AXIS - MUSE: 105 DEGREES
T AXIS - MUSE: 110 DEGREES
VENTRICULAR RATE- MUSE: 104 BPM
VENTRICULAR RATE- MUSE: 86 BPM

## 2024-07-18 ASSESSMENT — ACTIVITIES OF DAILY LIVING (ADL)
ADLS_ACUITY_SCORE: 35

## 2024-07-18 ASSESSMENT — ENCOUNTER SYMPTOMS: DYSRHYTHMIAS: 1

## 2024-07-18 NOTE — ANESTHESIA POSTPROCEDURE EVALUATION
Patient: Neelima Zuniga    Procedure: Procedure(s):  Anesthesia cardioversion @1400       Anesthesia Type:  MAC    Note:  Disposition: Outpatient   Postop Pain Control: Uneventful            Sign Out: Well controlled pain   PONV: No   Neuro/Psych: Uneventful            Sign Out: Acceptable/Baseline neuro status   Airway/Respiratory: Uneventful            Sign Out: Acceptable/Baseline resp. status   CV/Hemodynamics: Uneventful            Sign Out: Acceptable CV status; No obvious hypovolemia; No obvious fluid overload   Other NRE: NONE   DID A NON-ROUTINE EVENT OCCUR? No    Event details/Postop Comments:  No complications.           Last vitals:  Vitals:    07/17/24 1341 07/17/24 1359 07/17/24 1412   BP: (!) 154/80 139/73 (!) 145/74   Pulse: 88 93 89   Resp: 16 16 16   SpO2: 93% 92% 97%       Electronically Signed By: Dereck Givens MD  July 18, 2024  1:52 PM

## 2024-07-18 NOTE — ANESTHESIA PREPROCEDURE EVALUATION
Anesthesia Pre-Procedure Evaluation    Patient: Neelima Zuniga   MRN: [id number] : 1945        Procedure : Procedure(s):  ENDOSCOPIC ULTRASOUND, UPPER TRACT/  ESOPHAGOGASTRODUODENOSCOPY          Past Medical History:   Diagnosis Date    Allergic rhinitis     Amblyopia     left eye    Anxiety     Aortic valve sclerosis     Basal cell carcinoma (BCC) of eye     Infraorbital left    Breast cancer (H)     Cancer (H)     right breast    Diabetes mellitus, type 2 (H)     Dysphagia     Gastric mass     Heart murmur     HTN (hypertension)     Hyperlipidemia     Left ventricular hypertrophy     Left ventricular outflow tract obstruction     Osteopenia     Palpitations     Reflux esophagitis     Sleep apnea     CPAP    SVT (supraventricular tachycardia) (H24)     Uterine fibroid       Past Surgical History:   Procedure Laterality Date    ANESTHESIA CARDIOVERSION N/A 2024    Procedure: Anesthesia cardioversion @1400;  Surgeon: GENERIC ANESTHESIA PROVIDER;  Location: [facility] OR    BIOPSY BREAST Right 2007    BREAST SURGERY Right     reconstruction    BREAST SURGERY Left     augmentation    CATARACT IOL, RT/LT       SECTION      ESOPHAGOSCOPY, GASTROSCOPY, DUODENOSCOPY (EGD), COMBINED N/A 3/22/2024    Procedure: ENDOSCOPIC ULTRASOUND, UPPER TRACT/;  Surgeon: Jaime Croft MD;  Location: [facility] OR    ESOPHAGOSCOPY, GASTROSCOPY, DUODENOSCOPY (EGD), COMBINED N/A 3/22/2024    Procedure: ESOPHAGOGASTRODUODENOSCOPY, POLYPECTOMY, GASTRIC AND DUODENAL BIOPSY;  Surgeon: Jaime Croft MD;  Location: [facility] OR    EXAM UNDER ANESTHESIA RECTUM N/A 10/28/2022    Procedure: RECTAL EXAM UNDER ANESTHESIA;  Surgeon: Priscilla Orr MD;  Location: [facility] OR    EXAM UNDER ANESTHESIA, INJECT BOTOX N/A 10/28/2022    Procedure: BOTOX INJECTION, EXCISION PAPILLA;  Surgeon: Priscilla Orr MD;  Location: [facility] OR    FOOT SURGERY Right     HYSTERECTOMY  2015     MAMMOPLASTY AUGMENTATION Bilateral     MASTECTOMY Right 09/01/2007    MOHS MICROGRAPHIC PROCEDURE      OOPHORECTOMY Bilateral 01/01/2015    SPHINCTEROTOMY RECTUM N/A 10/28/2022    Procedure: POSSIBLE SPHINCTEROTOMY;  Surgeon: Priscilla Orr MD;  Location: [facility] OR      Allergies   Allergen Reactions    Erythromycin     Metoprolol Nausea    Seasonal Allergies      Other Reaction(s): Unknown    Sulfamethoxazole-Trimethoprim Hives    Sulfa Antibiotics Rash      Social History     Tobacco Use    Smoking status: Never    Smokeless tobacco: Never   Substance Use Topics    Alcohol use: Not Currently      Wt Readings from Last 1 Encounters:   06/11/24 72.5 kg (159 lb 12.8 oz)        Anesthesia Evaluation   Pt has had prior anesthetic. Type: MAC and General.        ROS/MED HX  ENT/Pulmonary:     (+) sleep apnea,                                       Neurologic:       Cardiovascular: Comment: Interpretation Summary     1.Left ventricular size, wall motion and function are normal. The ejection  fraction is > 65%.  2.There is borderline concentric left ventricular hypertrophy.  3.An intracavitary gradient is present. Peak velocity is 6.6 m/sec with  gradient mean over 60 mmHg. Ao valve not well seen. Doubt significant AS  4.Normal right ventricle size and systolic function.  5.The left atrium is mild to moderately dilated.  6.No hemodynamically significant valvular abnormalities on 2D or color flow  imaging.  7.The study was technically difficult.  Compared to the prior study dated 1/12/2023, the LV gradient is higher. Would  suggest CHAITANYA or MRI to visualzie Ao Valve as doubt significant AS.      (+)  hypertension- -   -  - -      CHF                  dysrhythmias,              METS/Exercise Tolerance:     Hematologic:       Musculoskeletal:       GI/Hepatic:       Renal/Genitourinary:       Endo:     (+)  type II DM,                    Psychiatric/Substance Use:       Infectious Disease:       Malignancy:      "  Other:            Physical Exam    Airway  airway exam normal      Mallampati: II   TM distance: > 3 FB   Neck ROM: full     Respiratory Devices and Support         Dental       (+) Minor Abnormalities - some fillings, tiny chips      Cardiovascular   cardiovascular exam normal          Pulmonary   pulmonary exam normal              OUTSIDE LABS:  CBC:   Lab Results   Component Value Date    WBC 7.0 06/11/2024    WBC 7.3 12/21/2022    HGB 13.0 06/11/2024    HGB 15.0 12/21/2022    HCT 39.2 06/11/2024    HCT 46.8 12/21/2022     06/11/2024     12/21/2022     BMP:   Lab Results   Component Value Date     06/11/2024     03/01/2024    POTASSIUM 4.7 07/17/2024    POTASSIUM 4.5 06/11/2024    CHLORIDE 105 06/11/2024    CHLORIDE 100 03/01/2024    CO2 27 06/11/2024    CO2 27 03/01/2024    BUN 20.0 06/11/2024    BUN 22.1 03/01/2024    CR 0.68 06/11/2024    CR 0.65 03/01/2024     (H) 06/11/2024     (H) 03/22/2024     COAGS: No results found for: \"PTT\", \"INR\", \"FIBR\"  POC: No results found for: \"BGM\", \"HCG\", \"HCGS\"  HEPATIC:   Lab Results   Component Value Date    ALBUMIN 4.4 06/11/2024    PROTTOTAL 6.7 06/11/2024    ALT 27 06/11/2024    AST 25 06/11/2024    ALKPHOS 109 06/11/2024    BILITOTAL 0.5 06/11/2024     OTHER:   Lab Results   Component Value Date    A1C 6.6 (H) 01/05/2024    BECKY 9.4 06/11/2024    MAG 2.4 (H) 07/17/2024    TSH 1.88 12/21/2022    T4 0.98 07/20/2022       Anesthesia Plan    ASA Status:  3       Anesthesia Type: MAC.   Induction: Intravenous.   Maintenance: Inhalation.   Techniques and Equipment:     - Lines/Monitors: 2nd IV     Consents    Anesthesia Plan(s) and associated risks, benefits, and realistic alternatives discussed. Questions answered and patient/representative(s) expressed understanding.     - Discussed: Risks, Benefits and Alternatives for BOTH SEDATION and the PROCEDURE were discussed     - Discussed with:  Patient      - Extended " Intubation/Ventilatory Support Discussed: Yes.      - Patient is DNR/DNI Status: No     Use of blood products discussed: Yes.     - Discussed with: Patient.     Postoperative Care    Pain management: IV analgesics, Oral pain medications.   PONV prophylaxis: Ondansetron (or other 5HT-3), Dexamethasone or Solumedrol     Comments:    Other Comments: The material risks, benefits, and alternatives were discussed in detail.  The patient agrees to proceed.  The patient has no other complaints at this time.           Dereck Givens MD    I have reviewed the pertinent notes and labs in the chart from the past 30 days and (re)examined the patient.  Any updates or changes from those notes are reflected in this note.            # Drug Induced Coagulation Defect: home medication list includes an anticoagulant medication

## 2024-08-07 ENCOUNTER — OFFICE VISIT (OUTPATIENT)
Dept: CARDIOLOGY | Facility: CLINIC | Age: 79
End: 2024-08-07
Attending: NURSE PRACTITIONER
Payer: MEDICARE

## 2024-08-07 VITALS
OXYGEN SATURATION: 93 % | BODY MASS INDEX: 23.71 KG/M2 | HEART RATE: 66 BPM | SYSTOLIC BLOOD PRESSURE: 136 MMHG | DIASTOLIC BLOOD PRESSURE: 75 MMHG | WEIGHT: 160.56 LBS

## 2024-08-07 DIAGNOSIS — I48.92 ATRIAL FLUTTER, UNSPECIFIED TYPE (H): ICD-10-CM

## 2024-08-07 DIAGNOSIS — I51.89 LEFT VENTRICULAR OUTFLOW TRACT OBSTRUCTION AFTER PROCEDURE: ICD-10-CM

## 2024-08-07 DIAGNOSIS — I97.89 LEFT VENTRICULAR OUTFLOW TRACT OBSTRUCTION AFTER PROCEDURE: ICD-10-CM

## 2024-08-07 DIAGNOSIS — I47.10 SVT (SUPRAVENTRICULAR TACHYCARDIA) (H): Primary | ICD-10-CM

## 2024-08-07 PROCEDURE — 93010 ELECTROCARDIOGRAM REPORT: CPT | Performed by: INTERNAL MEDICINE

## 2024-08-07 PROCEDURE — 93005 ELECTROCARDIOGRAM TRACING: CPT

## 2024-08-07 PROCEDURE — 99214 OFFICE O/P EST MOD 30 MIN: CPT | Performed by: NURSE PRACTITIONER

## 2024-08-07 PROCEDURE — G0463 HOSPITAL OUTPT CLINIC VISIT: HCPCS | Performed by: NURSE PRACTITIONER

## 2024-08-07 ASSESSMENT — PAIN SCALES - GENERAL: PAINLEVEL: NO PAIN (0)

## 2024-08-07 NOTE — LETTER
8/7/2024      RE: Neelima Zuniga  [address]  [place] [address]       Dear Colleague,    Thank you for the opportunity to participate in the care of your patient, Neelima Zuniga, at the [facility] [place] [facility]. Please see a copy of my visit note below.        CV GENETICS ELECTROPHYSIOLOGY CLINIC VISIT    Assessment/Recommendations   Assessment/Plan:    Ms. Zuniga is an 79 year old female who has a medical history significant for SVT , AFL (CHADSVASC 4 on Eliquis), HOCM, HTN, breast CA, and BEATRIZ (uses CPAP).     Atrial Flutter  I had a long discussion with the patient about AF/AFL, including the natural history of the disease, risk of embolic events, role of antithrombotic therapy, role of rate control therapy, the role of antiarrhythmic medications, and the role of an ablation. We discussed the AFFIRM trial.  1. Stroke Prophylaxis: CHADSVASC ++age, +gender, +HTN 4, corresponding to a 4.0% annual stroke / systemic embolism event rate. She also requires anticoagulation given HCM diagnosis. She is appropriately on Eliquis. No bleeding issues.   2. Rate Control: Continue Toprol XL.   3. Rhythm Control: Cardioversion, Antiarrhythmics and/or ablation are options for rhythm control. She has newly diagnosed AFL in 6/2024. She had CHAITANYA/DCCV on 7/17/24. Doing well after DCCV noted some symptom improvement in SOB/NDIAYE. No recurrences. Previously had been on Flecainide for SVT but had side effects and stopped medication. Not currently needing AAT. Could use disopyramide  if needed especially with LVOTO.   4. Risk Factor Management: Statin, BP control, and BEATRIZ evaluation as indicated.     Hypertrophic cardiomyopathy  -Continue beta blockers  -Encouraged adequate hydration and avoidance of strenous physical activity   -Reinforced exercise recommendations with HCM (e.g. Circ 2014 recommendations: Avoidance of burst exercise, competitive  training,weight-lifting)  Family Risk   -Will arrange for patient to see genetic counselor in our Cardiovascular Genetics Clinic   - Discussed in detail with patient family screening   Risk stratification for sudden cardiac death   Risk Factors Screening:  Family history of SCD: negative   Wall thickness: negative (+ if wall thickness >3 cm)   Unexplained Syncope: negative (does not have a personal history of syncope)  Abnormal blood pressure response with exercise: negative (No hypotensive response with exercise)  VT/NSVT: negative (she does not have VT/NSVT noted on monitoring).   - reinforced to patient to consider all presyncope or syncope seriously, and activate EMS if occurs.  HCM followed by Dr. Kinsey/Dacia Ansari    Follow up with EP in 6 months.        History of Present Illness/Subjective    Ms. Neelima Zuniga is an 79 year old female who comes in today for EP consultation of AFL.    Ms. Zuniga is an 79 year old female who has a medical history significant for SVT , AFL (CHADSVASC 4 on Eliquis), HOCM, HTN, breast CA, and BEATRIZ (uses CPAP).     She states she has had SVT since 2008 and has been previously on Flecainide for that; however, this caused dizziness and she stopped medications. She then started noticing some SOB/NDIAYE when walking that increased in severity around 12/2023. She had an echo that showed LVOT obstruction with LVEF >65%. A CMR from 3/2024 showed HCM max wall thickness 1.7 cm. A prior zio patch monitor showed no ventricular arrhythmias.. She has no identified high-risk features for sudden death. 5% MID myocardial In particular, she does not have significant LGE burden on his cardiac MRI (<5% mid myocardial LGE). Maximal LV wall thickness is 1.7 cm. She has no history of unexplained syncope. No known family history of SCD.  She saw Dr. Tian in 6/2024 and was found to be in AFL. She was started on anticoagulation and then had CHAITANYA/DCCV on 7/17/24. She has been referred to genetic  counseling. She will be following up with Dacia Ansari to discuss options of LVOTO.     She reports feeling at baseline today. She notes some NDIAYE, but does state this improved after DCCV . She denies chest discomfort, palpitations, abdominal fullness/bloating or peripheral edema, shortness of breath, paroxysmal nocturnal dyspnea, orthopnea, lightheadedness, dizziness, pre-syncope, or syncope. Presenting 12 lead ECG shows NSR Vent Rate 95 bpm,  ms,  ms, QTc 454 ms. Current cardiac medications include: Toprol XL, Digoxin, Crestor, Losartan, and Eliquis.         I have reviewed and updated the patient's Past Medical History, Social History, Family History and Medication List.     Cardiographics (Personally Reviewed) :   1/19/24 Echo:   Interpretation Summary     1.Left ventricular size, wall motion and function are normal. The ejection  fraction is > 65%.  2.There is borderline concentric left ventricular hypertrophy.  3.An intracavitary gradient is present. Peak velocity is 6.6 m/sec with  gradient mean over 60 mmHg. Ao valve not well seen. Doubt significant AS  4.Normal right ventricle size and systolic function.  5.The left atrium is mild to moderately dilated.  6.No hemodynamically significant valvular abnormalities on 2D or color flow  imaging.  7.The study was technically difficult.  Compared to the prior study dated 1/12/2023, the LV gradient is higher. Would  suggest CHAITANYA or MRI to visualzie Ao Valve as doubt significant AS.  3/20/24 CMR:  1.  Normal left ventricular size.  Severe asymmetric left ventricular hypertrophy.  The maximal thickness  of basal anteroseptal wall is 1.7 cm.  The thickness of posterolateral wall is 1.2 cm. There is obvious  accelerated LVOT flow velocity indicating some obstruction.  However, there is no anterior septal motion of  mitral valve. There is no rest perfusion defect.  The calculated left ventricular ejection fraction is 62%.  2.  No prior myocardial infarction,  myocardial scar or other infiltrative process.  T1 mapping value is  mildly elevated to 1016 ms (normal reference less than 1000 ms).  3.  Normal right ventricular size and systolic function.  4.  Mild to moderate mitral and tricuspid valve regurgitation.     Comments: Put all together, the differential diagnosis is hypertensive cardiomyopathy versus hypertrophic  cardiomyopathy.  It is not consistent with amyloid cardiomyopathy.       Physical Examination   /75 (BP Location: Left arm, Patient Position: Chair, Cuff Size: Adult Regular)   Pulse 66   Wt 72.8 kg (160 lb 9 oz)   SpO2 93%   BMI 23.71 kg/m    Wt Readings from Last 3 Encounters:   06/11/24 72.5 kg (159 lb 12.8 oz)   04/02/24 73.2 kg (161 lb 4.8 oz)   03/22/24 72.2 kg (159 lb 1 oz)     CONSITUTIONAL: no acute distress  HEENT: no icterus, no redness or discharge, neck supple  CV: no visible edema of visualized extremities. No JVD.   RESPIRATORY: respirations nonlabored, no cough  NEURO: AA&Ox3, speech fluent/appropriate, motor grossly nonfocal  PSYCH: cooperative, affect appropriate  DERM: no rashes on visualized face/neck/upper extremities         Medications  Allergies   Current Outpatient Medications   Medication Sig Dispense Refill     apixaban ANTICOAGULANT (ELIQUIS ANTICOAGULANT) 5 MG tablet Take 1 tablet (5 mg) by mouth 2 times daily 180 tablet 3     calcium-vitamin D (CALTRATE) 600-400 MG-UNIT per tablet Take 1 tablet by mouth 2 times daily       digoxin (LANOXIN) 250 MCG tablet Take 1 tablet (0.25 mg) by mouth daily 90 tablet 3     eszopiclone (LUNESTA) 2 MG tablet Take 2 mg by mouth nightly as needed for sleep       Glucosamine Sulfate 1500 MG PACK Take 1,500 mg by mouth daily       Lactobacillus Rhamnosus, GG, ( PROBIOTIC DIGESTIVE CARE) CAPS Take 1 capsule by mouth daily       losartan (COZAAR) 100 MG tablet TAKE 1 TABLET BY MOUTH EVERY DAY for 90 days       MAGNESIUM OXIDE PO Take 400 mg by mouth daily       metoprolol succinate ER  (TOPROL XL) 25 MG 24 hr tablet Take 0.5 tablets (12.5 mg) by mouth daily 60 tablet 1     MULTIPLE VITAMIN PO daily       omeprazole (PRILOSEC) 40 MG DR capsule Take 40 mg by mouth every morning       polyethylene glycol (MIRALAX/GLYCOLAX) packet Take 1 packet by mouth daily       ROSUVASTATIN CALCIUM PO Take 10 mg by mouth daily       saline 0.65 % SOLN Spray 1 spray in nostril       venlafaxine (EFFEXOR XR) 37.5 MG 24 hr capsule Take 1 capsule (37.5 mg) by mouth daily 90 capsule 3    Allergies   Allergen Reactions     Erythromycin      Metoprolol Nausea     Seasonal Allergies      Other Reaction(s): Unknown     Sulfamethoxazole-Trimethoprim Hives     Sulfa Antibiotics Rash         Lab Results (Personally Reviewed)    Chemistry/lipid CBC Cardiac Enzymes/BNP/TSH/INR   Lab Results   Component Value Date    BUN 20.0 06/11/2024     06/11/2024    CO2 27 06/11/2024     Creatinine   Date Value Ref Range Status   06/11/2024 0.68 0.51 - 0.95 mg/dL Final       Lab Results   Component Value Date    CHOL 153 01/05/2024    HDL 37 (L) 01/05/2024    LDL 76 01/05/2024      Lab Results   Component Value Date    WBC 7.0 06/11/2024    HGB 13.0 06/11/2024    HCT 39.2 06/11/2024    MCV 89 06/11/2024     06/11/2024    Lab Results   Component Value Date    TSH 1.88 12/21/2022        The patient states understanding and is agreeable with the plan.   VALERIE Gordillo CNP  Electrophysiology Consult Service  Securely message with [employer]   Text page via [facility] Paging/Directory                  Please do not hesitate to contact me if you have any questions/concerns.     Sincerely,     VALERIE Booth CNP

## 2024-08-07 NOTE — PROGRESS NOTES
CV GENETICS ELECTROPHYSIOLOGY CLINIC VISIT    Assessment/Recommendations   Assessment/Plan:    Ms. Zuniga is an 79 year old female who has a medical history significant for SVT , AFL (CHADSVASC 4 on Eliquis), HOCM, HTN, breast CA, and BEATRIZ (uses CPAP).     Atrial Flutter  I had a long discussion with the patient about AF/AFL, including the natural history of the disease, risk of embolic events, role of antithrombotic therapy, role of rate control therapy, the role of antiarrhythmic medications, and the role of an ablation. We discussed the AFFIRM trial.  1. Stroke Prophylaxis: CHADSVASC ++age, +gender, +HTN 4, corresponding to a 4.0% annual stroke / systemic embolism event rate. She also requires anticoagulation given HCM diagnosis. She is appropriately on Eliquis. No bleeding issues.   2. Rate Control: Continue Toprol XL.   3. Rhythm Control: Cardioversion, Antiarrhythmics and/or ablation are options for rhythm control. She has newly diagnosed AFL in 6/2024. She had CHAITANYA/DCCV on 7/17/24. Doing well after DCCV noted some symptom improvement in SOB/NDIAYE. No recurrences. Previously had been on Flecainide for SVT but had side effects and stopped medication. Not currently needing AAT. Could use disopyramide  if needed especially with LVOTO.   4. Risk Factor Management: Statin, BP control, and BEATRIZ evaluation as indicated.     Hypertrophic cardiomyopathy  -Continue beta blockers  -Encouraged adequate hydration and avoidance of strenous physical activity   -Reinforced exercise recommendations with HCM (e.g. Circ 2014 recommendations: Avoidance of burst exercise, competitive training,weight-lifting)  Family Risk   -Will arrange for patient to see genetic counselor in our Cardiovascular Genetics Clinic   - Discussed in detail with patient family screening   Risk stratification for sudden cardiac death   Risk Factors Screening:  Family history of SCD: negative   Wall thickness: negative (+ if wall thickness >3 cm)    Unexplained Syncope: negative (does not have a personal history of syncope)  Abnormal blood pressure response with exercise: negative (No hypotensive response with exercise)  VT/NSVT: negative (she does not have VT/NSVT noted on monitoring).   - reinforced to patient to consider all presyncope or syncope seriously, and activate EMS if occurs.  HCM followed by Dr. Kinsey/Dacia Ansari    Follow up with EP in 6 months.        History of Present Illness/Subjective    Ms. Neelima Zuniga is an 79 year old female who comes in today for EP consultation of AFL.    Ms. Zuniga is an 79 year old female who has a medical history significant for SVT , AFL (CHADSVASC 4 on Eliquis), HOCM, HTN, breast CA, and BEATRIZ (uses CPAP).     She states she has had SVT since 2008 and has been previously on Flecainide for that; however, this caused dizziness and she stopped medications. She then started noticing some SOB/NDIAYE when walking that increased in severity around 12/2023. She had an echo that showed LVOT obstruction with LVEF >65%. A CMR from 3/2024 showed HCM max wall thickness 1.7 cm. A prior zio patch monitor showed no ventricular arrhythmias.. She has no identified high-risk features for sudden death. 5% MID myocardial In particular, she does not have significant LGE burden on his cardiac MRI (<5% mid myocardial LGE). Maximal LV wall thickness is 1.7 cm. She has no history of unexplained syncope. No known family history of SCD.  She saw Dr. Tian in 6/2024 and was found to be in AFL. She was started on anticoagulation and then had CHAITANYA/DCCV on 7/17/24. She has been referred to genetic counseling. She will be following up with Dacia Ansari to discuss options of LVOTO.     She reports feeling at baseline today. She notes some NDIAYE, but does state this improved after DCCV . She denies chest discomfort, palpitations, abdominal fullness/bloating or peripheral edema, shortness of breath, paroxysmal nocturnal dyspnea, orthopnea,  lightheadedness, dizziness, pre-syncope, or syncope. Presenting 12 lead ECG shows NSR Vent Rate 95 bpm,  ms,  ms, QTc 454 ms. Current cardiac medications include: Toprol XL, Digoxin, Crestor, Losartan, and Eliquis.         I have reviewed and updated the patient's Past Medical History, Social History, Family History and Medication List.     Cardiographics (Personally Reviewed) :   1/19/24 Echo:   Interpretation Summary     1.Left ventricular size, wall motion and function are normal. The ejection  fraction is > 65%.  2.There is borderline concentric left ventricular hypertrophy.  3.An intracavitary gradient is present. Peak velocity is 6.6 m/sec with  gradient mean over 60 mmHg. Ao valve not well seen. Doubt significant AS  4.Normal right ventricle size and systolic function.  5.The left atrium is mild to moderately dilated.  6.No hemodynamically significant valvular abnormalities on 2D or color flow  imaging.  7.The study was technically difficult.  Compared to the prior study dated 1/12/2023, the LV gradient is higher. Would  suggest CHAITANYA or MRI to visualzie Ao Valve as doubt significant AS.  3/20/24 CMR:  1.  Normal left ventricular size.  Severe asymmetric left ventricular hypertrophy.  The maximal thickness  of basal anteroseptal wall is 1.7 cm.  The thickness of posterolateral wall is 1.2 cm. There is obvious  accelerated LVOT flow velocity indicating some obstruction.  However, there is no anterior septal motion of  mitral valve. There is no rest perfusion defect.  The calculated left ventricular ejection fraction is 62%.  2.  No prior myocardial infarction, myocardial scar or other infiltrative process.  T1 mapping value is  mildly elevated to 1016 ms (normal reference less than 1000 ms).  3.  Normal right ventricular size and systolic function.  4.  Mild to moderate mitral and tricuspid valve regurgitation.     Comments: Put all together, the differential diagnosis is hypertensive cardiomyopathy  versus hypertrophic  cardiomyopathy.  It is not consistent with amyloid cardiomyopathy.       Physical Examination   /75 (BP Location: Left arm, Patient Position: Chair, Cuff Size: Adult Regular)   Pulse 66   Wt 72.8 kg (160 lb 9 oz)   SpO2 93%   BMI 23.71 kg/m    Wt Readings from Last 3 Encounters:   06/11/24 72.5 kg (159 lb 12.8 oz)   04/02/24 73.2 kg (161 lb 4.8 oz)   03/22/24 72.2 kg (159 lb 1 oz)     CONSITUTIONAL: no acute distress  HEENT: no icterus, no redness or discharge, neck supple  CV: no visible edema of visualized extremities. No JVD.   RESPIRATORY: respirations nonlabored, no cough  NEURO: AA&Ox3, speech fluent/appropriate, motor grossly nonfocal  PSYCH: cooperative, affect appropriate  DERM: no rashes on visualized face/neck/upper extremities         Medications  Allergies   Current Outpatient Medications   Medication Sig Dispense Refill    apixaban ANTICOAGULANT (ELIQUIS ANTICOAGULANT) 5 MG tablet Take 1 tablet (5 mg) by mouth 2 times daily 180 tablet 3    calcium-vitamin D (CALTRATE) 600-400 MG-UNIT per tablet Take 1 tablet by mouth 2 times daily      digoxin (LANOXIN) 250 MCG tablet Take 1 tablet (0.25 mg) by mouth daily 90 tablet 3    eszopiclone (LUNESTA) 2 MG tablet Take 2 mg by mouth nightly as needed for sleep      Glucosamine Sulfate 1500 MG PACK Take 1,500 mg by mouth daily      Lactobacillus Rhamnosus, GG, ( PROBIOTIC DIGESTIVE CARE) CAPS Take 1 capsule by mouth daily      losartan (COZAAR) 100 MG tablet TAKE 1 TABLET BY MOUTH EVERY DAY for 90 days      MAGNESIUM OXIDE PO Take 400 mg by mouth daily      metoprolol succinate ER (TOPROL XL) 25 MG 24 hr tablet Take 0.5 tablets (12.5 mg) by mouth daily 60 tablet 1    MULTIPLE VITAMIN PO daily      omeprazole (PRILOSEC) 40 MG DR capsule Take 40 mg by mouth every morning      polyethylene glycol (MIRALAX/GLYCOLAX) packet Take 1 packet by mouth daily      ROSUVASTATIN CALCIUM PO Take 10 mg by mouth daily      saline 0.65 % SOLN Spray  1 spray in nostril      venlafaxine (EFFEXOR XR) 37.5 MG 24 hr capsule Take 1 capsule (37.5 mg) by mouth daily 90 capsule 3    Allergies   Allergen Reactions    Erythromycin     Metoprolol Nausea    Seasonal Allergies      Other Reaction(s): Unknown    Sulfamethoxazole-Trimethoprim Hives    Sulfa Antibiotics Rash         Lab Results (Personally Reviewed)    Chemistry/lipid CBC Cardiac Enzymes/BNP/TSH/INR   Lab Results   Component Value Date    BUN 20.0 06/11/2024     06/11/2024    CO2 27 06/11/2024     Creatinine   Date Value Ref Range Status   06/11/2024 0.68 0.51 - 0.95 mg/dL Final       Lab Results   Component Value Date    CHOL 153 01/05/2024    HDL 37 (L) 01/05/2024    LDL 76 01/05/2024      Lab Results   Component Value Date    WBC 7.0 06/11/2024    HGB 13.0 06/11/2024    HCT 39.2 06/11/2024    MCV 89 06/11/2024     06/11/2024    Lab Results   Component Value Date    TSH 1.88 12/21/2022        The patient states understanding and is agreeable with the plan.   VALERIE Gordillo CNP  Electrophysiology Consult Service  Securely message with [employer]   Text page via [facility] Paging/Directory

## 2024-08-08 LAB
ATRIAL RATE - MUSE: 95 BPM
DIASTOLIC BLOOD PRESSURE - MUSE: NORMAL MMHG
INTERPRETATION ECG - MUSE: NORMAL
P AXIS - MUSE: 51 DEGREES
PR INTERVAL - MUSE: 182 MS
QRS DURATION - MUSE: 104 MS
QT - MUSE: 362 MS
QTC - MUSE: 454 MS
R AXIS - MUSE: -34 DEGREES
SYSTOLIC BLOOD PRESSURE - MUSE: NORMAL MMHG
T AXIS - MUSE: 112 DEGREES
VENTRICULAR RATE- MUSE: 95 BPM

## 2024-09-01 NOTE — PROGRESS NOTES
HPI:   Initial Visit 06/11/2024  Neelima Zuniga is a 79 year old female with history of SVT (previously on metoprolol, flecainide, and digoxin), htn    Referred for LVOT obstruction, found on echo after patient was initiated on hydrochlorothiazide.    She goes to the [facility] three times a week, sometimes twice a day. She participates in silver sneaker classes and is able to do these without any symptoms chest pain, dyspnea at rest or with exertion, PND, orthopnea, peripheral edema, palpitations, lightheadedness or syncope.     However she states that when she walks slowly on a flat surface she will experience shortness of breath, this has been going on for a while and there has been no change in the severity of the symptoms. She first noticed this in December 2023 when she was on a walking tour of [facility] and had to walk quickly to keep up with the . She is able to perform ADLs including grocery shopping. She states she does not experience shortness of breath while she is grocery shopping and she is unsure if this is because she leans on the cart for stability.    Interval History 06/03/2024  Neelima is doing okay today. She underwent electrical cardioversion in early August and reports improvement, but not resolution, of NDIAYE at that time. However, her symptoms worsened about 2-3 weeks ago, when she suspects that she went back into atrial flutter. Otherwise, her symptoms are at baseline. No chest pain. She has intermittent palpitations which are consistent with prior supraventricular arrhythmias. No lightheadedness or syncope. No orthopnea, PND, or SEBASTIAN.     Neelima has a trip to [place] planned for ten days starting this weekend.     PAST MEDICAL HISTORY:  Past Medical History:   Diagnosis Date    Allergic rhinitis     Amblyopia     left eye    Anxiety     Aortic valve sclerosis     Basal cell carcinoma (BCC) of eye 2007    Infraorbital left    Breast cancer (H) 2007    Cancer (H) 2009    right  breast    Diabetes mellitus, type 2 (H)     Dysphagia     Gastric mass     Heart murmur     HTN (hypertension)     Hyperlipidemia     Left ventricular hypertrophy     Left ventricular outflow tract obstruction     Osteopenia     Palpitations     Reflux esophagitis     Sleep apnea     CPAP    SVT (supraventricular tachycardia) (H24)     Uterine fibroid        CURRENT MEDICATIONS:  Current Outpatient Medications   Medication Sig Dispense Refill    apixaban ANTICOAGULANT (ELIQUIS ANTICOAGULANT) 5 MG tablet Take 1 tablet (5 mg) by mouth 2 times daily 180 tablet 3    calcium-vitamin D (CALTRATE) 600-400 MG-UNIT per tablet Take 1 tablet by mouth 2 times daily      digoxin (LANOXIN) 250 MCG tablet Take 1 tablet (0.25 mg) by mouth daily 90 tablet 3    eszopiclone (LUNESTA) 2 MG tablet Take 2 mg by mouth nightly as needed for sleep      Glucosamine Sulfate 1500 MG PACK Take 1,500 mg by mouth daily      Lactobacillus Rhamnosus, GG, ( PROBIOTIC DIGESTIVE CARE) CAPS Take 1 capsule by mouth daily      losartan (COZAAR) 100 MG tablet TAKE 1 TABLET BY MOUTH EVERY DAY for 90 days      MAGNESIUM OXIDE PO Take 400 mg by mouth daily      metoprolol succinate ER (TOPROL XL) 25 MG 24 hr tablet Take 0.5 tablets (12.5 mg) by mouth daily 60 tablet 1    MULTIPLE VITAMIN PO daily      omeprazole (PRILOSEC) 40 MG DR capsule Take 40 mg by mouth every morning      polyethylene glycol (MIRALAX/GLYCOLAX) packet Take 1 packet by mouth daily      ROSUVASTATIN CALCIUM PO Take 10 mg by mouth daily      saline 0.65 % SOLN Spray 1 spray in nostril      venlafaxine (EFFEXOR XR) 37.5 MG 24 hr capsule Take 1 capsule (37.5 mg) by mouth daily 90 capsule 3       ALLERGIES:     Allergies   Allergen Reactions    Erythromycin     Metoprolol Nausea    Seasonal Allergies      Other Reaction(s): Unknown    Sulfamethoxazole-Trimethoprim Hives    Sulfa Antibiotics Rash       FAMILY HISTORY:  Family History   Problem Relation Age of Onset    Macular Degeneration  "Father     Glaucoma Father     Glaucoma Paternal Grandmother     Diabetes Mother     Hypertension Mother     Hypertension Father     Hyperlipidemia Father      No family history of sudden death.    Mom: CHF  Paternal GF:  from \"heart issues\"   Sister: Afib     SOCIAL HISTORY:  Social History     Tobacco Use    Smoking status: Never    Smokeless tobacco: Never   Vaping Use    Vaping status: Never Used   Substance Use Topics    Alcohol use: Not Currently    Drug use: No       ROS:   A comprehensive 14 point review of systems is negative other than as mentioned in HPI.    Exam:  BP (!) 149/63 (BP Location: Left arm)   Pulse 74   Wt 73.4 kg (161 lb 12.8 oz)   SpO2 97%   BMI 23.89 kg/m    GENERAL APPEARANCE: healthy, alert and no distress  EYES: no icterus, no xanthelasmas  ENT: normal palate, mucosa moist, no central cyanosis  NECK: JVP not elevated  RESPIRATORY: lungs clear to auscultation - no rales, rhonchi or wheezes, no use of accessory muscles, no retractions, respirations are unlabored, normal respiratory rate  CARDIOVASCULAR: regular rhythm, normal S1 with physiologic split S2, no S3 or S4 and no murmur, click or rub.  EXTREMITIES: no edema  NEURO: alert and oriented to person/place/time, normal speech  SKIN: no ecchymoses, no rashes.  PSYCH: cooperative, affect appropriate.     Labs:  Reviewed.     Testing/Procedures:    I personally visualized and interpreted:  24 TTE  Known HCM with asymmetric hypertrophy. CANDIDA with septal contact resulting in LVOTO. LVOT gradient at rest 68 mmHg, Valsalva 82 mmHg. RAP ~15 mmHg. Overall, similar to prior echo 24.       2024 Zio   3 runs of nonsustained VT, 1 of which is associated with symptoms.   Runs of nonsustained SVT.  Symptomatic episodes corresponded to sinus rhythm with PACs, PVCs, or a run of nonsustained VT.    3/20/24 CMR  1.  Normal left ventricular size.  Severe asymmetric left ventricular hypertrophy.  The maximal thickness  of basal " anteroseptal wall is 1.7 cm.  The thickness of posterolateral wall is 1.2 cm. There is obvious  accelerated LVOT flow velocity indicating some obstruction.  However, there is no anterior septal motion of  mitral valve. There is no rest perfusion defect.  The calculated left ventricular ejection fraction is 62%.  2.  No prior myocardial infarction, myocardial scar or other infiltrative process.  T1 mapping value is  mildly elevated to 1016 ms (normal reference less than 1000 ms).  3.  Normal right ventricular size and systolic function.  4.  Mild to moderate mitral and tricuspid valve regurgitation.    1/19/24 TTE  Left ventricular size, wall motion and function are normal. The ejection  fraction is 60-65%.  Normal right ventricle size and systolic function.  The left atrium is mildly dilated.  Mild to moderate valvular aortic stenosis.  IVC diameter and respiratory changes fall into an intermediate range  suggesting an RA pressure of 8 mmHg.    1/12/23 TTE   Left ventricular size, wall motion and function are normal. The ejection  fraction is 60-65%.  Normal right ventricle size and systolic function.  The left atrium is mildly dilated.  Mild to moderate valvular aortic stenosis.  IVC diameter and respiratory changes fall into an intermediate range  suggesting an RA pressure of 8 mmHg.    1/25/22 Stress Echo    Interpretation Summary  1. Normal stress echocardiogram without evidence of stress induced ischemia.  2. Normal resting LV systolic performance with an ejection fraction of 55-60%.  There is normal improvement in left ventricular systolic performance with a  peak ejection fraction of 70-75%.  3. No ECG evidence of ischemia.  4. No anginal chest pain reported with exercise.  5. Average functional capacity for age.  6. Two PVCs and a solitary couplet were noted. No other rhythm disturbances  detected.    Assessment and Plan:   Ms Neelima Zuniga is a 79 year old lady with a history of HCM with obstruction who  presents to clinic today for follow up.     1.HCM with Obstruction  2. Aflutter [id number] age (2), F, HTN)  HCM with obstruction, LVOT gradient ~63 mmHg with NYHA class II-III exertional dyspnea. History of non-sustained VT on ambulatory monitoring, no history of sustained arrhythmias.   At our first visit on 06/11/24, we discussed HCM and LVOTO as well as the various options for therapy. She was maintained on medical therapy with beta blockade, metoprolol succinate 12.5 mg daily, and wanted time to think about additional treatment modalities.   Since her last visit, she has undergone CHAITANYA with cardioversion. Unfortunately, she reverted back to atrial flutter, likely 2-3 weeks ago. Her symptoms today are unchanged from her prior visit now that she is back in atrial flutter.    In terms of management, she feels much better in normal sinus rhythm, likely due to improved LVOTO however she has not had an echo in NSR to prove this. We will plan for repeat DCCV this week (has not missed any doses of anticoagulant) prior to her trip abroad. For long-term management, we discussed her case with electrophysiology who will likely pursue ablation as a more definitive option for rhythm control.     Once rhythm has been optimized, we will explore second-line therapy for obstructive HCM as titration of beta blockade has been limited to 12.5 mg of metoprolol succinate.     Plan  - Continue metoprolol succinate 12.5 mg daily   - Refer for DCCV this week  - Continue Apixaban  - EP evaluation for atrial flutter ablation  - Consider second-line LVOTO therapies pending repeat evaluation in NSR  - Visit with IGNO after DCCV/ablation  - 6 months return to MD clinic with echo prior    This patient was seen and discussed with Dr Guy Kinsey, attending cardiologist, who agrees with the assessment and plan unless otherwise indicated.    Yakov Pryor MD MPP, PGY-6  Fellow, Cardiovascular Disease

## 2024-09-03 ENCOUNTER — ANCILLARY PROCEDURE (OUTPATIENT)
Dept: CARDIOLOGY | Facility: CLINIC | Age: 79
End: 2024-09-03
Attending: INTERNAL MEDICINE
Payer: MEDICARE

## 2024-09-03 VITALS
WEIGHT: 161.8 LBS | HEART RATE: 74 BPM | SYSTOLIC BLOOD PRESSURE: 149 MMHG | DIASTOLIC BLOOD PRESSURE: 63 MMHG | OXYGEN SATURATION: 97 % | BODY MASS INDEX: 23.89 KG/M2

## 2024-09-03 DIAGNOSIS — I48.3 TYPICAL ATRIAL FLUTTER (H): ICD-10-CM

## 2024-09-03 DIAGNOSIS — Q24.8 LEFT VENTRICULAR OUTFLOW TRACT OBSTRUCTION: ICD-10-CM

## 2024-09-03 DIAGNOSIS — I42.1 HOCM (HYPERTROPHIC OBSTRUCTIVE CARDIOMYOPATHY) (H): Primary | ICD-10-CM

## 2024-09-03 DIAGNOSIS — I42.1 HOCM (HYPERTROPHIC OBSTRUCTIVE CARDIOMYOPATHY) (H): ICD-10-CM

## 2024-09-03 LAB — LVEF ECHO: NORMAL

## 2024-09-03 PROCEDURE — 93306 TTE W/DOPPLER COMPLETE: CPT | Mod: GC | Performed by: STUDENT IN AN ORGANIZED HEALTH CARE EDUCATION/TRAINING PROGRAM

## 2024-09-03 PROCEDURE — 99215 OFFICE O/P EST HI 40 MIN: CPT | Mod: 25 | Performed by: INTERNAL MEDICINE

## 2024-09-03 PROCEDURE — G0463 HOSPITAL OUTPT CLINIC VISIT: HCPCS | Performed by: INTERNAL MEDICINE

## 2024-09-03 RX ORDER — SODIUM CHLORIDE 9 MG/ML
INJECTION, SOLUTION INTRAVENOUS CONTINUOUS
Status: ACTIVE | OUTPATIENT
Start: 2024-09-03

## 2024-09-03 RX ORDER — LIDOCAINE 40 MG/G
CREAM TOPICAL
Status: CANCELLED | OUTPATIENT
Start: 2024-09-03

## 2024-09-03 RX ORDER — MAGNESIUM SULFATE HEPTAHYDRATE 40 MG/ML
2 INJECTION, SOLUTION INTRAVENOUS
Status: CANCELLED | OUTPATIENT
Start: 2024-09-03

## 2024-09-03 RX ORDER — POTASSIUM CHLORIDE 1500 MG/1
20 TABLET, EXTENDED RELEASE ORAL
Status: CANCELLED | OUTPATIENT
Start: 2024-09-03 | End: 2024-09-03

## 2024-09-03 ASSESSMENT — PAIN SCALES - GENERAL: PAINLEVEL: NO PAIN (0)

## 2024-09-03 NOTE — NURSING NOTE
Return Appointment: Patient given instructions regarding scheduling next clinic visit. Patient demonstrated understanding of this information and agreed to call with further questions or concerns. Follow up with Dacia Ansari after ablation with echo prior. Follow up with Dr. Kinsey 6 months after ablation with echo prior.     EP Procedure: Patient given instructions regarding  cardioversion Discussed purpose, preparation, and procedure with the patient. Patient demonstrated understanding of this information and agreed to call with further questions or concerns. Plan for cardioversion tomorrow on 9/4/24 and Ablation after.     Patient stated she understood all health information given and agreed to call with further questions or concerns.     Mark Shelley RN

## 2024-09-03 NOTE — LETTER
September 3, 2024      TO: Neelima Zuniga  [address] [place] [address]         Dear Neelima,    You are scheduled for a Cardioversion, at [facility], [place]. The hospital is located at [address] [place] [address] on the [place] bank of the [place].  If you need to cancel this procedure, please call [phone].     Visitor Policy: Two visitors.    Date:__September 5, 2024____  Time: __11:30 am____To the [facility] Waiting Room at the [facility]  Cardioversion    Please do not eat anything for 8 hours prior to your procedure. You may have sips of water up until 2 hours prior to your arrival.  2. Medications to continue:  - Anticoagulant:  Eliquis  - Take all meds as prescribed, except for those noted below.  3. Medications to hold:    - None  4. You will discharge same day. You will need a .    If you have further questions, please utilize [employer] to contact us.   If your question concerns the above instructions, contact:  Mark Shelley, RN, BSN, PHN  RN Care Coordinator  [facility]etics  Phone: [phone]  Fax: [phone]     If your question concerns the schedule/appointment times, contact:  ANIA [facility]   [phone]

## 2024-09-03 NOTE — PATIENT INSTRUCTIONS
Thank you for visiting the Adult Congenital and Cardiovascular Genetics Clinic at the [facility].    Cardiology Providers you saw during your visit:  Guy Kinsey MD    Diagnosis:  HCM    Results:  Guy Kinsey MD reviewed the results of your echo and EKG testing today in clinic.    Recommendations for you:    We will help to arrange Cardioversion procedure for you tomorrow and Ablation procedure sometime after.     You are scheduled for a Cardioversion, at [facility], [place]. The hospital is located at [address] [place] [address] on the [place] bank of the [place].  If you need to cancel this procedure, please call [phone].       Visitor Policy: Two visitors.    Date:______  Time: _______________To the [facility] Waiting Room at the [facility]  Cardioversion    Please do not eat anything for 8 hours prior to your procedure. You may have sips of water up until 2 hours prior to your arrival.  2. Medications to continue:  - Anticoagulant:  Eliquis  - Take all meds as prescribed, except for those noted below.  3. Medications to hold:    - None  4. You will discharge same day. You will need a .    If you have further questions, please utilize [employer] to contact us.   If your question concerns the above instructions, contact:  Mark Shelley RN, BSN, PHN  RN Care Coordinator  [facility]  Phone: [phone]  Fax: [phone]       General Cardiac Recommendations:  Continue to eat a heart healthy, low salt diet.  Continue to get 20-30 minutes of aerobic activity, 4-5 days per week.  Examples of aerobic activity include walking, running, swimming, cycling, etc.  Continue to observe good oral hygiene, with regular dental visits.      SBE prophylaxis:   Yes____  No_x___    Exercise restrictions:   Yes__X__  No____         If yes, list restrictions:  Must be allowed to rest if fatigued or SOB      FASTING CHOLESTEROL was checked in the last 5 years YES__x__  NO____ (2024)  If  "no, please follow up with your primary care physician. You should have a cholesterol screening every 5 years.      Follow-up:  Follow up with Dacia Ansari after ablation with echo prior. Follow up with Dr. Kinsey 6 months after ablation with echo prior.     If you have questions or concerns please contact us at:    Mark Shelley RN, BSN     Skye Woods (Scheduling)  Nurse Care Coordinator     Clinic   CV Genetics      Adult Congenital and CV Genetic  [facility]  (P) [phone]     (P) [phone]  (F) [phone]     (F) [phone]        For after hours urgent needs, call [phone] and ask to speak to the \"On-Call Cardiologist.\"      For emergencies call 911.    [facility]  [facility]  Mail Code [id number]  [address] [place]  [address]   "

## 2024-09-03 NOTE — NURSING NOTE
Chief Complaint   Patient presents with    Follow Up     CV Genetics ([facility]) presenting for follow up.     Vitals were taken and medications reconciled.    Bob Borges, EMT  11:42 AM

## 2024-09-03 NOTE — LETTER
9/3/2024      RE: Neelima Zuniga  [address] [place] [address]       Dear Colleague,    Thank you for the opportunity to participate in the care of your patient, Neelima Zuniga, at the [facility] [place] [facility]. Please see a copy of my visit note below.      HPI:   Initial Visit 06/11/2024  Neelima Zuniga is a 79 year old female with history of SVT (previously on metoprolol, flecainide, and digoxin), htn    Referred for LVOT obstruction, found on echo after patient was initiated on hydrochlorothiazide.    She goes to the [facility] three times a week, sometimes twice a day. She participates in silver sneaker classes and is able to do these without any symptoms chest pain, dyspnea at rest or with exertion, PND, orthopnea, peripheral edema, palpitations, lightheadedness or syncope.     However she states that when she walks slowly on a flat surface she will experience shortness of breath, this has been going on for a while and there has been no change in the severity of the symptoms. She first noticed this in December 2023 when she was on a walking tour of [facility] and had to walk quickly to keep up with the . She is able to perform ADLs including grocery shopping. She states she does not experience shortness of breath while she is grocery shopping and she is unsure if this is because she leans on the cart for stability.    Interval History 06/03/2024  Neelima is doing okay today. She underwent electrical cardioversion in early August and reports improvement, but not resolution, of NDIAYE at that time. However, her symptoms worsened about 2-3 weeks ago, when she suspects that she went back into atrial flutter. Otherwise, her symptoms are at baseline. No chest pain. She has intermittent palpitations which are consistent with prior supraventricular arrhythmias. No lightheadedness or syncope. No orthopnea, PND, or SEBASTIAN.      Neelima has a trip to [place] planned for ten days starting this weekend.     PAST MEDICAL HISTORY:  Past Medical History:   Diagnosis Date     Allergic rhinitis      Amblyopia     left eye     Anxiety      Aortic valve sclerosis      Basal cell carcinoma (BCC) of eye 2007    Infraorbital left     Breast cancer (H) 2007     Cancer (H) 2009    right breast     Diabetes mellitus, type 2 (H)      Dysphagia      Gastric mass      Heart murmur      HTN (hypertension)      Hyperlipidemia      Left ventricular hypertrophy      Left ventricular outflow tract obstruction      Osteopenia      Palpitations      Reflux esophagitis      Sleep apnea     CPAP     SVT (supraventricular tachycardia) (H24)      Uterine fibroid        CURRENT MEDICATIONS:  Current Outpatient Medications   Medication Sig Dispense Refill     apixaban ANTICOAGULANT (ELIQUIS ANTICOAGULANT) 5 MG tablet Take 1 tablet (5 mg) by mouth 2 times daily 180 tablet 3     calcium-vitamin D (CALTRATE) 600-400 MG-UNIT per tablet Take 1 tablet by mouth 2 times daily       digoxin (LANOXIN) 250 MCG tablet Take 1 tablet (0.25 mg) by mouth daily 90 tablet 3     eszopiclone (LUNESTA) 2 MG tablet Take 2 mg by mouth nightly as needed for sleep       Glucosamine Sulfate 1500 MG PACK Take 1,500 mg by mouth daily       Lactobacillus Rhamnosus, GG, ( PROBIOTIC DIGESTIVE CARE) CAPS Take 1 capsule by mouth daily       losartan (COZAAR) 100 MG tablet TAKE 1 TABLET BY MOUTH EVERY DAY for 90 days       MAGNESIUM OXIDE PO Take 400 mg by mouth daily       metoprolol succinate ER (TOPROL XL) 25 MG 24 hr tablet Take 0.5 tablets (12.5 mg) by mouth daily 60 tablet 1     MULTIPLE VITAMIN PO daily       omeprazole (PRILOSEC) 40 MG DR capsule Take 40 mg by mouth every morning       polyethylene glycol (MIRALAX/GLYCOLAX) packet Take 1 packet by mouth daily       ROSUVASTATIN CALCIUM PO Take 10 mg by mouth daily       saline 0.65 % SOLN Spray 1 spray in nostril       venlafaxine (EFFEXOR  "XR) 37.5 MG 24 hr capsule Take 1 capsule (37.5 mg) by mouth daily 90 capsule 3       ALLERGIES:     Allergies   Allergen Reactions     Erythromycin      Metoprolol Nausea     Seasonal Allergies      Other Reaction(s): Unknown     Sulfamethoxazole-Trimethoprim Hives     Sulfa Antibiotics Rash       FAMILY HISTORY:  Family History   Problem Relation Age of Onset     Macular Degeneration Father      Glaucoma Father      Glaucoma Paternal Grandmother      Diabetes Mother      Hypertension Mother      Hypertension Father      Hyperlipidemia Father      No family history of sudden death.    Mom: CHF  Paternal GF:  from \"heart issues\"   Sister: Afib     SOCIAL HISTORY:  Social History     Tobacco Use     Smoking status: Never     Smokeless tobacco: Never   Vaping Use     Vaping status: Never Used   Substance Use Topics     Alcohol use: Not Currently     Drug use: No       ROS:   A comprehensive 14 point review of systems is negative other than as mentioned in HPI.    Exam:  BP (!) 149/63 (BP Location: Left arm)   Pulse 74   Wt 73.4 kg (161 lb 12.8 oz)   SpO2 97%   BMI 23.89 kg/m    GENERAL APPEARANCE: healthy, alert and no distress  EYES: no icterus, no xanthelasmas  ENT: normal palate, mucosa moist, no central cyanosis  NECK: JVP not elevated  RESPIRATORY: lungs clear to auscultation - no rales, rhonchi or wheezes, no use of accessory muscles, no retractions, respirations are unlabored, normal respiratory rate  CARDIOVASCULAR: regular rhythm, normal S1 with physiologic split S2, no S3 or S4 and no murmur, click or rub.  EXTREMITIES: no edema  NEURO: alert and oriented to person/place/time, normal speech  SKIN: no ecchymoses, no rashes.  PSYCH: cooperative, affect appropriate.     Labs:  Reviewed.     Testing/Procedures:    I personally visualized and interpreted:  24 TTE  Known HCM with asymmetric hypertrophy. CANDIDA with septal contact resulting in LVOTO. LVOT gradient at rest 68 mmHg, Valsalva 82 mmHg. RAP " ~15 mmHg. Overall, similar to prior echo 01/19/24.       04/2024 Zio   3 runs of nonsustained VT, 1 of which is associated with symptoms.   Runs of nonsustained SVT.  Symptomatic episodes corresponded to sinus rhythm with PACs, PVCs, or a run of nonsustained VT.    3/20/24 CMR  1.  Normal left ventricular size.  Severe asymmetric left ventricular hypertrophy.  The maximal thickness  of basal anteroseptal wall is 1.7 cm.  The thickness of posterolateral wall is 1.2 cm. There is obvious  accelerated LVOT flow velocity indicating some obstruction.  However, there is no anterior septal motion of  mitral valve. There is no rest perfusion defect.  The calculated left ventricular ejection fraction is 62%.  2.  No prior myocardial infarction, myocardial scar or other infiltrative process.  T1 mapping value is  mildly elevated to 1016 ms (normal reference less than 1000 ms).  3.  Normal right ventricular size and systolic function.  4.  Mild to moderate mitral and tricuspid valve regurgitation.    1/19/24 TTE  Left ventricular size, wall motion and function are normal. The ejection  fraction is 60-65%.  Normal right ventricle size and systolic function.  The left atrium is mildly dilated.  Mild to moderate valvular aortic stenosis.  IVC diameter and respiratory changes fall into an intermediate range  suggesting an RA pressure of 8 mmHg.    1/12/23 TTE   Left ventricular size, wall motion and function are normal. The ejection  fraction is 60-65%.  Normal right ventricle size and systolic function.  The left atrium is mildly dilated.  Mild to moderate valvular aortic stenosis.  IVC diameter and respiratory changes fall into an intermediate range  suggesting an RA pressure of 8 mmHg.    1/25/22 Stress Echo    Interpretation Summary  1. Normal stress echocardiogram without evidence of stress induced ischemia.  2. Normal resting LV systolic performance with an ejection fraction of 55-60%.  There is normal improvement in left  ventricular systolic performance with a  peak ejection fraction of 70-75%.  3. No ECG evidence of ischemia.  4. No anginal chest pain reported with exercise.  5. Average functional capacity for age.  6. Two PVCs and a solitary couplet were noted. No other rhythm disturbances  detected.    Assessment and Plan:   Ms Neelima Zuniga is a 79 year old lady with a history of HCM with obstruction who presents to clinic today for follow up.     1.HCM with Obstruction  2. Aflutter [id number]=6 age (2), F, HTN)  HCM with obstruction, LVOT gradient ~63 mmHg with NYHA class II-III exertional dyspnea. History of non-sustained VT on ambulatory monitoring, no history of sustained arrhythmias.   At our first visit on 06/11/24, we discussed HCM and LVOTO as well as the various options for therapy. She was maintained on medical therapy with beta blockade, metoprolol succinate 12.5 mg daily, and wanted time to think about additional treatment modalities.   Since her last visit, she has undergone CHAITANYA with cardioversion. Unfortunately, she reverted back to atrial flutter, likely 2-3 weeks ago. Her symptoms today are unchanged from her prior visit now that she is back in atrial flutter.    In terms of management, she feels much better in normal sinus rhythm, likely due to improved LVOTO however she has not had an echo in [facility] to prove this. We will plan for repeat DCCV this week (has not missed any doses of anticoagulant) prior to her trip abroad. For long-term management, we discussed her case with electrophysiology who will likely pursue ablation as a more definitive option for rhythm control.     Once rhythm has been optimized, we will explore second-line therapy for obstructive HCM as titration of beta blockade has been limited to 12.5 mg of metoprolol succinate.     Plan  - Continue metoprolol succinate 12.5 mg daily   - Refer for DCCV this week  - Continue Apixaban  - EP evaluation for atrial flutter ablation  - Consider  second-line LVOTO therapies pending repeat evaluation in NSR  - Visit with GINO after DCCV/ablation  - 6 months return to [facility] clinic with echo prior    This patient was seen and discussed with Dr Guy Kinsey, attending cardiologist, who agrees with the assessment and plan unless otherwise indicated.    Yakov Pryor MD [facility], PGY-6  Fellow, Cardiovascular Disease                 Attestation signed by Guy Kinsey MD at 9/3/2024  4:35 PM:  Physician Attestation  I, Guy Kinsey MD, saw this patient and agree with the findings and plan of care as documented in the note.      Items personally reviewed/procedural attestation: vitals, labs, imaging and agree with the interpretation documented in the note, and EKG and agree with the interpretation documented in the note.    I spent a total of 44 minutes on the day of the visit.   Time spent by me doing chart review, history and exam, documentation and further activities per the note    The longitudinal plan of care for the diagnosis(es)/condition(s) as documented were addressed during this visit. Due to the added complexity in care, I will continue to support Neelima in the subsequent management and with ongoing continuity of care.    Guy Kinsey MD      Please do not hesitate to contact me if you have any questions/concerns.     Sincerely,     Guy Kinsey MD

## 2024-09-04 ENCOUNTER — ANESTHESIA EVENT (OUTPATIENT)
Dept: SURGERY | Facility: CLINIC | Age: 79
End: 2024-09-04
Payer: MEDICARE

## 2024-09-04 ASSESSMENT — ENCOUNTER SYMPTOMS: DYSRHYTHMIAS: 1

## 2024-09-04 NOTE — ANESTHESIA PREPROCEDURE EVALUATION
Anesthesia Pre-Procedure Evaluation    Patient: Neelima Zuniga   MRN: [id number] : 1945        Procedure : Procedure(s):  Anesthesia cardioversion@1330          Past Medical History:   Diagnosis Date    Allergic rhinitis     Amblyopia     left eye    Anxiety     Aortic valve sclerosis     Basal cell carcinoma (BCC) of eye     Infraorbital left    Breast cancer (H)     Cancer (H)     right breast    Diabetes mellitus, type 2 (H)     Dysphagia     Gastric mass     Heart murmur     HTN (hypertension)     Hyperlipidemia     Left ventricular hypertrophy     Left ventricular outflow tract obstruction     Osteopenia     Palpitations     Reflux esophagitis     Sleep apnea     CPAP    SVT (supraventricular tachycardia) (H24)     Uterine fibroid       Past Surgical History:   Procedure Laterality Date    ANESTHESIA CARDIOVERSION N/A 2024    Procedure: Anesthesia cardioversion @1400;  Surgeon: GENERIC ANESTHESIA PROVIDER;  Location: [facility] OR    BIOPSY BREAST Right 2007    BREAST SURGERY Right     reconstruction    BREAST SURGERY Left     augmentation    CATARACT IOL, RT/LT       SECTION      ESOPHAGOSCOPY, GASTROSCOPY, DUODENOSCOPY (EGD), COMBINED N/A 3/22/2024    Procedure: ENDOSCOPIC ULTRASOUND, UPPER TRACT/;  Surgeon: Jaime Croft MD;  Location: [facility] [place]    ESOPHAGOSCOPY, GASTROSCOPY, DUODENOSCOPY (EGD), COMBINED N/A 3/22/2024    Procedure: ESOPHAGOGASTRODUODENOSCOPY, POLYPECTOMY, GASTRIC AND DUODENAL BIOPSY;  Surgeon: Jaime Croft MD;  Location: [facility] OR    EXAM UNDER ANESTHESIA RECTUM N/A 10/28/2022    Procedure: RECTAL EXAM UNDER ANESTHESIA;  Surgeon: Priscilla Orr MD;  Location: [facility] OR    EXAM UNDER ANESTHESIA, INJECT BOTOX N/A 10/28/2022    Procedure: BOTOX INJECTION, EXCISION PAPILLA;  Surgeon: Priscilla Orr MD;  Location: [facility] OR    FOOT SURGERY Right     HYSTERECTOMY  2015    MAMMOPLASTY AUGMENTATION Bilateral      MASTECTOMY Right 09/01/2007    MOHS MICROGRAPHIC PROCEDURE      OOPHORECTOMY Bilateral 01/01/2015    SPHINCTEROTOMY RECTUM N/A 10/28/2022    Procedure: POSSIBLE SPHINCTEROTOMY;  Surgeon: Priscilla Orr MD;  Location: [facility] Main OR      Allergies   Allergen Reactions    Erythromycin     Metoprolol Nausea    Seasonal Allergies      Other Reaction(s): Unknown    Sulfamethoxazole-Trimethoprim Hives    Sulfa Antibiotics Rash      Social History     Tobacco Use    Smoking status: Never    Smokeless tobacco: Never   Substance Use Topics    Alcohol use: Not Currently      Wt Readings from Last 1 Encounters:   09/03/24 73.4 kg (161 lb 12.8 oz)        Anesthesia Evaluation   Pt has had prior anesthetic. Type: MAC and General.    No history of anesthetic complications       ROS/MED HX  ENT/Pulmonary:     (+) sleep apnea,                                       Neurologic:       Cardiovascular: Comment: Interpretation Summary     1.Left ventricular size, wall motion and function are normal. The ejection  fraction is > 65%.  2.There is borderline concentric left ventricular hypertrophy.  3.An intracavitary gradient is present. Peak velocity is 6.6 m/sec with  gradient mean over 60 mmHg. Ao valve not well seen. Doubt significant AS  4.Normal right ventricle size and systolic function.  5.The left atrium is mild to moderately dilated.  6.No hemodynamically significant valvular abnormalities on 2D or color flow  imaging.  7.The study was technically difficult.  Compared to the prior study dated 1/12/2023, the LV gradient is higher. Would  suggest CHAITANYA or MRI to visualzie Ao Valve as doubt significant AS.      (+)  hypertension- -   -  - -      CHF                  dysrhythmias, a-flutter,             METS/Exercise Tolerance:     Hematologic:       Musculoskeletal:       GI/Hepatic:       Renal/Genitourinary:       Endo:     (+)  type II DM,                    Psychiatric/Substance Use:       Infectious Disease:       Malignancy:  "      Other:            Physical Exam    Airway        Mallampati: II   TM distance: > 3 FB   Neck ROM: full   Mouth opening: > 3 cm    Respiratory Devices and Support         Dental       (+) Modest Abnormalities - crowns, retainers, 1 or 2 missing teeth      Cardiovascular          Rhythm and rate: irregular and tachycardia     Pulmonary   pulmonary exam normal                OUTSIDE LABS:  CBC:   Lab Results   Component Value Date    WBC 7.0 06/11/2024    WBC 7.3 12/21/2022    HGB 13.0 06/11/2024    HGB 15.0 12/21/2022    HCT 39.2 06/11/2024    HCT 46.8 12/21/2022     06/11/2024     12/21/2022     BMP:   Lab Results   Component Value Date     06/11/2024     03/01/2024    POTASSIUM 4.7 07/17/2024    POTASSIUM 4.5 06/11/2024    CHLORIDE 105 06/11/2024    CHLORIDE 100 03/01/2024    CO2 27 06/11/2024    CO2 27 03/01/2024    BUN 20.0 06/11/2024    BUN 22.1 03/01/2024    CR 0.68 06/11/2024    CR 0.65 03/01/2024     (H) 06/11/2024     (H) 03/22/2024     COAGS: No results found for: \"PTT\", \"INR\", \"FIBR\"  POC: No results found for: \"BGM\", \"HCG\", \"HCGS\"  HEPATIC:   Lab Results   Component Value Date    ALBUMIN 4.4 06/11/2024    PROTTOTAL 6.7 06/11/2024    ALT 27 06/11/2024    AST 25 06/11/2024    ALKPHOS 109 06/11/2024    BILITOTAL 0.5 06/11/2024     OTHER:   Lab Results   Component Value Date    A1C 6.6 (H) 01/05/2024    BECKY 9.4 06/11/2024    MAG 2.4 (H) 07/17/2024    TSH 1.88 12/21/2022    T4 0.98 07/20/2022       Anesthesia Plan    ASA Status:  3    NPO Status:  Will be NPO Appropriate at ...    Anesthesia Type: General.     - Airway: Native airway              Consents    Anesthesia Plan(s) and associated risks, benefits, and realistic alternatives discussed. Questions answered and patient/representative(s) expressed understanding.     - Discussed: Risks, Benefits and Alternatives for BOTH SEDATION and the PROCEDURE were discussed     - Discussed with:  Patient      - Extended " Intubation/Ventilatory Support Discussed: No.      - Patient is DNR/DNI Status: No     Use of blood products discussed: No .     Postoperative Care            Comments:               Xavier Myers MD    I have reviewed the pertinent notes and labs in the chart from the past 30 days and (re)examined the patient.  Any updates or changes from those notes are reflected in this note.

## 2024-09-05 ENCOUNTER — ANESTHESIA (OUTPATIENT)
Dept: SURGERY | Facility: CLINIC | Age: 79
End: 2024-09-05
Payer: MEDICARE

## 2024-09-05 ENCOUNTER — HOSPITAL ENCOUNTER (OUTPATIENT)
Facility: CLINIC | Age: 79
Discharge: HOME | End: 2024-09-05
Attending: INTERNAL MEDICINE | Admitting: INTERNAL MEDICINE
Payer: MEDICARE

## 2024-09-05 ENCOUNTER — LAB (OUTPATIENT)
Dept: LAB | Facility: CLINIC | Age: 79
End: 2024-09-05
Payer: MEDICARE

## 2024-09-05 ENCOUNTER — HOSPITAL ENCOUNTER (OUTPATIENT)
Dept: CARDIOLOGY | Facility: CLINIC | Age: 79
Discharge: HOME | End: 2024-09-05
Attending: INTERNAL MEDICINE
Payer: MEDICARE

## 2024-09-05 ENCOUNTER — APPOINTMENT (OUTPATIENT)
Dept: MEDSURG UNIT | Facility: CLINIC | Age: 79
End: 2024-09-05
Payer: MEDICARE

## 2024-09-05 VITALS
DIASTOLIC BLOOD PRESSURE: 68 MMHG | RESPIRATION RATE: 28 BRPM | OXYGEN SATURATION: 96 % | SYSTOLIC BLOOD PRESSURE: 139 MMHG | HEART RATE: 96 BPM

## 2024-09-05 VITALS
OXYGEN SATURATION: 97 % | SYSTOLIC BLOOD PRESSURE: 134 MMHG | HEART RATE: 93 BPM | RESPIRATION RATE: 27 BRPM | DIASTOLIC BLOOD PRESSURE: 88 MMHG

## 2024-09-05 DIAGNOSIS — I48.3 TYPICAL ATRIAL FLUTTER (H): Primary | ICD-10-CM

## 2024-09-05 DIAGNOSIS — I48.3 TYPICAL ATRIAL FLUTTER (H): ICD-10-CM

## 2024-09-05 LAB
MAGNESIUM SERPL-MCNC: 2.5 MG/DL (ref 1.7–2.3)
POTASSIUM SERPL-SCNC: 4.6 MMOL/L (ref 3.4–5.3)

## 2024-09-05 PROCEDURE — 92960 CARDIOVERSION ELECTRIC EXT: CPT | Performed by: NURSE PRACTITIONER

## 2024-09-05 PROCEDURE — 99100 ANES PT EXTEME AGE<1 YR&>70: CPT | Performed by: ANESTHESIOLOGY

## 2024-09-05 PROCEDURE — 999N000054 HC STATISTIC EKG NON-CHARGEABLE

## 2024-09-05 PROCEDURE — 83735 ASSAY OF MAGNESIUM: CPT | Performed by: INTERNAL MEDICINE

## 2024-09-05 PROCEDURE — 84132 ASSAY OF SERUM POTASSIUM: CPT | Performed by: INTERNAL MEDICINE

## 2024-09-05 PROCEDURE — 999N000132 HC STATISTIC PP CARE STAGE 1

## 2024-09-05 PROCEDURE — 92960 CARDIOVERSION ELECTRIC EXT: CPT | Performed by: NURSE ANESTHETIST, CERTIFIED REGISTERED

## 2024-09-05 PROCEDURE — 99100 ANES PT EXTEME AGE<1 YR&>70: CPT | Performed by: NURSE ANESTHETIST, CERTIFIED REGISTERED

## 2024-09-05 PROCEDURE — 93005 ELECTROCARDIOGRAM TRACING: CPT

## 2024-09-05 PROCEDURE — 370N000017 HC ANESTHESIA TECHNICAL FEE, PER MIN

## 2024-09-05 PROCEDURE — 92960 CARDIOVERSION ELECTRIC EXT: CPT

## 2024-09-05 PROCEDURE — 92960 CARDIOVERSION ELECTRIC EXT: CPT | Performed by: ANESTHESIOLOGY

## 2024-09-05 PROCEDURE — 250N000009 HC RX 250: Performed by: NURSE ANESTHETIST, CERTIFIED REGISTERED

## 2024-09-05 PROCEDURE — 36415 COLL VENOUS BLD VENIPUNCTURE: CPT | Performed by: INTERNAL MEDICINE

## 2024-09-05 RX ORDER — DEXAMETHASONE SODIUM PHOSPHATE 4 MG/ML
4 INJECTION, SOLUTION INTRA-ARTICULAR; INTRALESIONAL; INTRAMUSCULAR; INTRAVENOUS; SOFT TISSUE
Status: DISCONTINUED | OUTPATIENT
Start: 2024-09-05 | End: 2024-09-05 | Stop reason: HOSPADM

## 2024-09-05 RX ORDER — MAGNESIUM SULFATE HEPTAHYDRATE 40 MG/ML
2 INJECTION, SOLUTION INTRAVENOUS
Status: DISCONTINUED | OUTPATIENT
Start: 2024-09-05 | End: 2024-09-06 | Stop reason: HOSPADM

## 2024-09-05 RX ORDER — OXYCODONE HYDROCHLORIDE 10 MG/1
10 TABLET ORAL
Status: DISCONTINUED | OUTPATIENT
Start: 2024-09-05 | End: 2024-09-05 | Stop reason: HOSPADM

## 2024-09-05 RX ORDER — ONDANSETRON 2 MG/ML
4 INJECTION INTRAMUSCULAR; INTRAVENOUS EVERY 30 MIN PRN
Status: DISCONTINUED | OUTPATIENT
Start: 2024-09-05 | End: 2024-09-05 | Stop reason: HOSPADM

## 2024-09-05 RX ORDER — METHOHEXITAL IN WATER/PF 100MG/10ML
SYRINGE (ML) INTRAVENOUS PRN
Status: DISCONTINUED | OUTPATIENT
Start: 2024-09-05 | End: 2024-09-05

## 2024-09-05 RX ORDER — ONDANSETRON 4 MG/1
4 TABLET, ORALLY DISINTEGRATING ORAL EVERY 30 MIN PRN
Status: DISCONTINUED | OUTPATIENT
Start: 2024-09-05 | End: 2024-09-05 | Stop reason: HOSPADM

## 2024-09-05 RX ORDER — POTASSIUM CHLORIDE 750 MG/1
20 TABLET, EXTENDED RELEASE ORAL
Status: ACTIVE | OUTPATIENT
Start: 2024-09-05 | End: 2024-09-05

## 2024-09-05 RX ORDER — LIDOCAINE 40 MG/G
CREAM TOPICAL
OUTPATIENT
Start: 2024-09-05

## 2024-09-05 RX ORDER — OXYCODONE HYDROCHLORIDE 5 MG/1
5 TABLET ORAL
Status: DISCONTINUED | OUTPATIENT
Start: 2024-09-05 | End: 2024-09-05 | Stop reason: HOSPADM

## 2024-09-05 RX ORDER — SODIUM CHLORIDE 9 MG/ML
INJECTION, SOLUTION INTRAVENOUS CONTINUOUS
OUTPATIENT
Start: 2024-09-05

## 2024-09-05 RX ORDER — NALOXONE HYDROCHLORIDE 0.4 MG/ML
0.1 INJECTION, SOLUTION INTRAMUSCULAR; INTRAVENOUS; SUBCUTANEOUS
Status: DISCONTINUED | OUTPATIENT
Start: 2024-09-05 | End: 2024-09-05 | Stop reason: HOSPADM

## 2024-09-05 RX ADMIN — Medication 50 MG: at 13:01

## 2024-09-05 ASSESSMENT — ACTIVITIES OF DAILY LIVING (ADL)
ADLS_ACUITY_SCORE: 33
ADLS_ACUITY_SCORE: 35
ADLS_ACUITY_SCORE: 33

## 2024-09-05 NOTE — PROCEDURES
[facility]    Procedure: Cardioversion    Date/Time: 9/5/2024 1:08 PM    Performed by: Rose Mary Persaud APRN CNP  Authorized by: Guy Kinsey MD      UNIVERSAL PROTOCOL   Site Marked: NA  Prior Images Obtained and Reviewed:  NA  Required items: Required blood products, implants, devices and special equipment available    Patient identity confirmed:  Verbally with patient and arm band  Patient was reevaluated immediately before administering moderate or deep sedation or anesthesia  Confirmation Checklist:  Procedure was appropriate and matched the consent or emergent situation, patient's identity using two indicators, relevant allergies and correct equipment/implants were available  Time out: Immediately prior to the procedure a time out was called    Universal Protocol: the Joint Commission Universal Protocol was followed       ANESTHESIA    Anesthesia was administered and monitored by anesthesiology.  See anesthesia documentation for details.    PROCEDURE DETAILS  Pre-procedure rhythm: atrial flutter  Patient position: patient was placed in a supine position  Chest area: chest area exposed  Electrodes: pads  Electrodes placed: anterior-posterior  Number of attempts: 1    Details of Attempts:  Patient reported taking uniterrupted anticoagulation for at least 1 month. 150J biphasic synchronized shock delivered with successful conversion.     Post-procedure rhythm: normal sinus rhythm  Complications: no complications      PROCEDURE    Patient Tolerance:  Patient tolerated the procedure well with no immediate complications        VALERIE Gordillo CNP  Electrophysiology Consult Service  Securely message with [employer]   Text page via [facility] Paging/Directory

## 2024-09-05 NOTE — DISCHARGE INSTRUCTIONS
GOING HOME AFTER YOUR CARDIOVERSION    FOR NEXT 24 HOURS:  An adult should stay with you.  Relax and take it easy.  DO NOT make any important legal decisions.  DO NOT drive or operate machines at home or at work.  DO NOT consume alcohol.   Resume your regular diet and drink plenty of fluids.  If you have any redness/skin sorness where the patches were placed, you may use aloe vera gel or 1% hydrocortisone cream to the skin (sold at drug stores)  Take Tylenol (Acetaminophen) per your provider's recommendations as needed to help relieve local pain.     CALL YOUR HEALTHCARE PROVIDER IF:  You develop nausea or vomiting  You develop hives or a rash or any unexplained itching  Have irregular heartbeat or fast pulse  Feel faint, dizzy, or lightheaded  Have chest pain with increased activity  Have bleeding issues from blood-thinning medicines    CALL 911 RIGHT AWAY IF YOU HAVE:  Pain in your chest, arm, shoulder, neck, or upper back  Shortness of breath  Loss of vision, speech, or strength or coordination in any body part  Weakness in your arm or leg  Uncontrolled bleeding  Feel unstable in any way     FOLLOW UP APPOINTMENT:    Follow up as scheduled with EP/Cardiology Provider in 4 weeks    ADDITIONAL INFORMATION:  Cardiovascular Clinic:   [address] [place] [address]. [place] [address]  Your Care Team:  EP Cardiology   Telephone Number     Ruth Ceballos RN [phone]    After business hours: [phone], select option 4, ask for EP Fellow on call to be paged.     For scheduling appts or procedures:    Alisa Wilkinson   [phone]   For the Device Clinic (Pacemakers and ICD's)   RN's :   Francia Mark  During business hours: [phone]     After business hours:   [phone]- select option 4 and ask for job code [id number].       As always, Thank you for trusting us with your health care needs!

## 2024-09-05 NOTE — ANESTHESIA POSTPROCEDURE EVALUATION
Patient: Neelima Zuniga    Procedure: Procedure(s):  Anesthesia cardioversion@1330  Cardioversion External    Anesthesia Type:  General    Note:  Disposition: Outpatient   Postop Pain Control: Uneventful            Sign Out: Well controlled pain   PONV: No   Neuro/Psych: Uneventful            Sign Out: Acceptable/Baseline neuro status   Airway/Respiratory: Uneventful            Sign Out: Acceptable/Baseline resp. status   CV/Hemodynamics: Uneventful            Sign Out: Acceptable CV status; No obvious hypovolemia; No obvious fluid overload   Other NRE: NONE   DID A NON-ROUTINE EVENT OCCUR? No           Last vitals:  Vitals:    09/05/24 1314 09/05/24 1317 09/05/24 1320   BP:  134/88 134/88   Pulse: 88 92 93   Resp: 25 22 27   SpO2: 94% 97% 97%       Electronically Signed By: Davonte Corona MD  September 5, 2024  2:55 PM

## 2024-09-05 NOTE — ANESTHESIA CARE TRANSFER NOTE
Patient: Neelima Zuniga    Procedure: Procedure(s):  Anesthesia cardioversion@1330       Diagnosis: Atrial flutter (H) [I48.92]  Diagnosis Additional Information: No value filed.    Anesthesia Type:   General     Note:    Oropharynx: oropharynx clear of all foreign objects  Level of Consciousness: awake  Oxygen Supplementation: nasal cannula  Level of Supplemental Oxygen (L/min / FiO2): 5    Dentition: dentition unchanged      Destination: Echo Lab.          Vitals:  Vitals Value Taken Time   BP     Temp     Pulse     Resp     SpO2         Electronically Signed By: VALERIE Vail CRNA  September 5, 2024  1:06 PM

## 2024-09-05 NOTE — PROGRESS NOTES
Pt tolerated ambulation around unit. Able to void without complications. Pt's friend in [facility] sent to get car from ramp. Pt left unit with another RN to meet friend at main entrance. Pt stable.

## 2024-09-05 NOTE — PROGRESS NOTES
Pt arrived to Unit 2a from Echo, post cardioversion procedure. VSS. Denies pain. Cardiac rhythm sinus rhythm. Post procedure ECG done in Echo prior to arrival. Pt tolerating PO. Pt stable.

## 2024-09-05 NOTE — SEDATION DOCUMENTATION
Pt arrived in ECHO department for scheduled cardioversion.   Procedure explained, questions answered and consent signed. Discharge instructions discussed with patient.  Labs are WNL today and patient denies any missed doses of her Eliquis in the past one month.  Anesthesia gave pt 50 mg IV brevitol for sedation and pt was DCCV at 150 Joules to a SR.  Pt denied pain after procedure and was transferred to unit 2A after awake and VSS.   Report given to Nori ALVES RN.

## 2024-09-08 LAB
ATRIAL RATE - MUSE: 278 BPM
ATRIAL RATE - MUSE: 278 BPM
ATRIAL RATE - MUSE: 91 BPM
DIASTOLIC BLOOD PRESSURE - MUSE: NORMAL MMHG
INTERPRETATION ECG - MUSE: NORMAL
P AXIS - MUSE: 119 DEGREES
P AXIS - MUSE: 54 DEGREES
P AXIS - MUSE: 88 DEGREES
PR INTERVAL - MUSE: 198 MS
PR INTERVAL - MUSE: NORMAL MS
PR INTERVAL - MUSE: NORMAL MS
QRS DURATION - MUSE: 108 MS
QRS DURATION - MUSE: 110 MS
QRS DURATION - MUSE: 120 MS
QT - MUSE: 390 MS
QT - MUSE: 396 MS
QT - MUSE: 404 MS
QTC - MUSE: 448 MS
QTC - MUSE: 479 MS
QTC - MUSE: 539 MS
R AXIS - MUSE: -33 DEGREES
R AXIS - MUSE: -39 DEGREES
R AXIS - MUSE: -41 DEGREES
SYSTOLIC BLOOD PRESSURE - MUSE: NORMAL MMHG
T AXIS - MUSE: 123 DEGREES
T AXIS - MUSE: 140 DEGREES
T AXIS - MUSE: 148 DEGREES
VENTRICULAR RATE- MUSE: 107 BPM
VENTRICULAR RATE- MUSE: 77 BPM
VENTRICULAR RATE- MUSE: 91 BPM

## 2024-09-11 ENCOUNTER — TELEPHONE (OUTPATIENT)
Dept: CARDIOLOGY | Facility: CLINIC | Age: 79
End: 2024-09-11
Payer: MEDICARE

## 2024-09-11 NOTE — TELEPHONE ENCOUNTER
EP Scheduling called the patient to schedule Atrial Flutter Ablation with Dr. Bowser. The number [phone] was left for the patient to return the call and schedule the procedure.    Alisa Wilkinson  Periop Electrophysiology   [phone]

## 2024-09-15 ENCOUNTER — HEALTH MAINTENANCE LETTER (OUTPATIENT)
Age: 79
End: 2024-09-15

## 2024-09-17 NOTE — TELEPHONE ENCOUNTER
Another call was made to schedule the patient's ablation. The call went right to voicemail. The mychart has not been read. A letter will be mailed to the patient's home and we will follow up in a couple of weeks.     Alisa Wilkinson  Periop Electrophysiology   [phone]

## 2024-11-04 ENCOUNTER — LAB REQUISITION (OUTPATIENT)
Dept: LAB | Facility: CLINIC | Age: 79
End: 2024-11-04
Payer: MEDICARE

## 2024-11-04 ENCOUNTER — TRANSFERRED RECORDS (OUTPATIENT)
Dept: HEALTH INFORMATION MANAGEMENT | Facility: CLINIC | Age: 79
End: 2024-11-04

## 2024-11-04 DIAGNOSIS — E11.9 TYPE 2 DIABETES MELLITUS WITHOUT COMPLICATIONS (H): ICD-10-CM

## 2024-11-04 DIAGNOSIS — E78.5 HYPERLIPIDEMIA, UNSPECIFIED: ICD-10-CM

## 2024-11-04 LAB
ALBUMIN SERPL BCG-MCNC: 4.4 G/DL (ref 3.5–5.2)
ALP SERPL-CCNC: 88 U/L (ref 40–150)
ALT SERPL W P-5'-P-CCNC: 21 U/L (ref 0–50)
ANION GAP SERPL CALCULATED.3IONS-SCNC: 11 MMOL/L (ref 7–15)
AST SERPL W P-5'-P-CCNC: 19 U/L (ref 0–45)
BASOPHILS # BLD AUTO: 0 10E3/UL (ref 0–0.2)
BASOPHILS NFR BLD AUTO: 1 %
BILIRUB SERPL-MCNC: 0.5 MG/DL
BUN SERPL-MCNC: 15.1 MG/DL (ref 8–23)
CALCIUM SERPL-MCNC: 9.2 MG/DL (ref 8.8–10.4)
CHLORIDE SERPL-SCNC: 99 MMOL/L (ref 98–107)
CHOLEST SERPL-MCNC: 175 MG/DL
CREAT SERPL-MCNC: 0.63 MG/DL (ref 0.51–0.95)
EGFRCR SERPLBLD CKD-EPI 2021: 89 ML/MIN/1.73M2
EOSINOPHIL # BLD AUTO: 0.2 10E3/UL (ref 0–0.7)
EOSINOPHIL NFR BLD AUTO: 2 %
ERYTHROCYTE [DISTWIDTH] IN BLOOD BY AUTOMATED COUNT: 13.7 % (ref 10–15)
EST. AVERAGE GLUCOSE BLD GHB EST-MCNC: 163 MG/DL
FASTING STATUS PATIENT QL REPORTED: ABNORMAL
FASTING STATUS PATIENT QL REPORTED: ABNORMAL
GLUCOSE SERPL-MCNC: 176 MG/DL (ref 70–99)
HBA1C MFR BLD: 7.3 %
HCO3 SERPL-SCNC: 28 MMOL/L (ref 22–29)
HCT VFR BLD AUTO: 43.3 % (ref 35–47)
HDLC SERPL-MCNC: 40 MG/DL
HGB BLD-MCNC: 13.9 G/DL (ref 11.7–15.7)
IMM GRANULOCYTES # BLD: 0 10E3/UL
IMM GRANULOCYTES NFR BLD: 0 %
LDLC SERPL CALC-MCNC: 89 MG/DL
LYMPHOCYTES # BLD AUTO: 2.2 10E3/UL (ref 0.8–5.3)
LYMPHOCYTES NFR BLD AUTO: 33 %
MCH RBC QN AUTO: 28.6 PG (ref 26.5–33)
MCHC RBC AUTO-ENTMCNC: 32.1 G/DL (ref 31.5–36.5)
MCV RBC AUTO: 89 FL (ref 78–100)
MONOCYTES # BLD AUTO: 0.8 10E3/UL (ref 0–1.3)
MONOCYTES NFR BLD AUTO: 13 %
NEUTROPHILS # BLD AUTO: 3.4 10E3/UL (ref 1.6–8.3)
NEUTROPHILS NFR BLD AUTO: 51 %
NONHDLC SERPL-MCNC: 135 MG/DL
NRBC # BLD AUTO: 0 10E3/UL
NRBC BLD AUTO-RTO: 0 /100
PLATELET # BLD AUTO: 286 10E3/UL (ref 150–450)
POTASSIUM SERPL-SCNC: 4.5 MMOL/L (ref 3.4–5.3)
PROT SERPL-MCNC: 6.7 G/DL (ref 6.4–8.3)
RBC # BLD AUTO: 4.86 10E6/UL (ref 3.8–5.2)
SODIUM SERPL-SCNC: 138 MMOL/L (ref 135–145)
TRIGL SERPL-MCNC: 229 MG/DL
WBC # BLD AUTO: 6.6 10E3/UL (ref 4–11)

## 2024-11-04 PROCEDURE — 83036 HEMOGLOBIN GLYCOSYLATED A1C: CPT | Mod: ORL | Performed by: STUDENT IN AN ORGANIZED HEALTH CARE EDUCATION/TRAINING PROGRAM

## 2024-11-04 PROCEDURE — 80061 LIPID PANEL: CPT | Mod: ORL | Performed by: STUDENT IN AN ORGANIZED HEALTH CARE EDUCATION/TRAINING PROGRAM

## 2024-11-04 PROCEDURE — 80053 COMPREHEN METABOLIC PANEL: CPT | Mod: ORL | Performed by: STUDENT IN AN ORGANIZED HEALTH CARE EDUCATION/TRAINING PROGRAM

## 2024-11-04 PROCEDURE — 85025 COMPLETE CBC W/AUTO DIFF WBC: CPT | Mod: ORL | Performed by: STUDENT IN AN ORGANIZED HEALTH CARE EDUCATION/TRAINING PROGRAM

## 2024-11-12 ENCOUNTER — TRANSFERRED RECORDS (OUTPATIENT)
Dept: HEALTH INFORMATION MANAGEMENT | Facility: CLINIC | Age: 79
End: 2024-11-12

## 2024-11-20 ENCOUNTER — TELEPHONE (OUTPATIENT)
Dept: CARDIOLOGY | Facility: CLINIC | Age: 79
End: 2024-11-20
Payer: MEDICARE

## 2024-11-20 NOTE — TELEPHONE ENCOUNTER
Called and spoke with Neelima regarding information and instructions for upcoming AFL ablation on verified date of procedure: 11/25/24 starting at 12:30 pm. Patient was given instructions regarding AFL ablation. Discussed purpose, preparation, and procedure with patient. Patient received an instruction letter today for reference. Patient verbalized understanding of information reviewed for pre and post procedure and agreed to call with further questions or concerns. Neelima requests call from provider to discuss risk of procedure. Informed patient writer will follow up with provider to arrange call to help answer patient's questions.     Confirmed 3 month follow up with Rose Mary Persaud on 2/26/25. Discussed arranging for follow up with Dayana Ansari with echo prior as well as 6 months follow up with Dr. Kinsey in May with echo prior. Patient agrees to arrange for follow up visits.     Mark Shelley RN     You are scheduled for an Atrial Flutter Ablation, at [facility]. The hospital is located at [address] [place] [address] [place] the [place].  If you need to cancel this procedure, please call [phone].      Visitor Policy: Two visitors.     Date:_November 25, 2024___Time: _12:30pm  _To the Gold Waiting Room at the [facility]  Atrial Flutter Ablation     1. Your history and physical will be completed by our advanced practice provider when you arrive.  2. Please do not eat anything for 8 hours prior to your procedure. You may have sips of water up until 2 hours prior to your arrival.  3. Medications to continue:  - Anticoagulant : if you take a dose in the morning, please take it before you arrive.   - Take all meds as prescribed, except for those noted below.  4. Medications to hold:               - NONE      Post-Procedure Instructions  Care of groin site:   Remove the Band-Aid after 24 hours. If there is minor oozing, apply another Band-aid and remove it after 12 hours.     Do NOT take a bath, use a hot tub, pool, or submerse in water for at least 3 days. You may shower.    It is normal to have a small bruise or lump at the site.   Do not scrub the site.   Do not use lotion or powder near the puncture site for 3 days.    If you start bleeding from the site in your groin: Lie down flat and press firmly on the site. Call your physician immediately, or, come to the emergency room.  Call 911 right away if you have bleeding that is heavy or does not stop.    Call your doctor/provider if:    You have a large or growing hard lump around the site.    The site is red, swollen, hot or tender.    You have chills or a fever greater than 101 F (38 C).    Blood or fluid is draining from the site.    Your leg or arm turns bluish, feels numb or cool.    You have hives, a rash or unusual itching.      Activity Restrictions   For the first 2 days: Do not stoop or squat. When you cough, sneeze or move your bowels, hold your hand over the puncture site and press gently.   Do not lift more than 10 pounds or exertional activity for 10 days.  - No driving for 24 hours after (with or without general anesthesia).         Date: __Feburary 26th, 2025 at 9:30 am with Rose Mary Persaud____ Follow up appointment      Please do not hesitate to utilize [employer] or call us if you have any questions or concerns.    Mark Shelley, RN, BSN, PHN  RN Care Coordinator  [facility]  Phone: [phone]  Fax: [phone]     Alisa JORGE Procedure   [phone]

## 2024-11-24 ENCOUNTER — HEALTH MAINTENANCE LETTER (OUTPATIENT)
Age: 79
End: 2024-11-24

## 2024-11-25 ENCOUNTER — HOSPITAL ENCOUNTER (OUTPATIENT)
Facility: CLINIC | Age: 79
Discharge: HOME | End: 2024-11-25
Attending: INTERNAL MEDICINE | Admitting: INTERNAL MEDICINE
Payer: MEDICARE

## 2024-11-25 VITALS
DIASTOLIC BLOOD PRESSURE: 61 MMHG | SYSTOLIC BLOOD PRESSURE: 140 MMHG | TEMPERATURE: 98.1 F | HEIGHT: 64 IN | OXYGEN SATURATION: 91 % | HEART RATE: 87 BPM | BODY MASS INDEX: 26.91 KG/M2 | WEIGHT: 157.63 LBS | RESPIRATION RATE: 20 BRPM

## 2024-11-25 DIAGNOSIS — I48.3 TYPICAL ATRIAL FLUTTER (H): Primary | ICD-10-CM

## 2024-11-25 DIAGNOSIS — I47.10 SVT (SUPRAVENTRICULAR TACHYCARDIA) (H): ICD-10-CM

## 2024-11-25 DIAGNOSIS — I48.3 TYPICAL ATRIAL FLUTTER (H): ICD-10-CM

## 2024-11-25 LAB
ANION GAP SERPL CALCULATED.3IONS-SCNC: 12 MMOL/L (ref 7–15)
ATRIAL RATE - MUSE: 90 BPM
BUN SERPL-MCNC: 19.4 MG/DL (ref 8–23)
CALCIUM SERPL-MCNC: 9.5 MG/DL (ref 8.8–10.4)
CHLORIDE SERPL-SCNC: 103 MMOL/L (ref 98–107)
CREAT SERPL-MCNC: 0.61 MG/DL (ref 0.51–0.95)
DIASTOLIC BLOOD PRESSURE - MUSE: NORMAL MMHG
EGFRCR SERPLBLD CKD-EPI 2021: 90 ML/MIN/1.73M2
ERYTHROCYTE [DISTWIDTH] IN BLOOD BY AUTOMATED COUNT: 13.1 % (ref 10–15)
GLUCOSE SERPL-MCNC: 132 MG/DL (ref 70–99)
HCO3 SERPL-SCNC: 26 MMOL/L (ref 22–29)
HCT VFR BLD AUTO: 40.6 % (ref 35–47)
HGB BLD-MCNC: 13.3 G/DL (ref 11.7–15.7)
INTERPRETATION ECG - MUSE: NORMAL
MCH RBC QN AUTO: 28.8 PG (ref 26.5–33)
MCHC RBC AUTO-ENTMCNC: 32.8 G/DL (ref 31.5–36.5)
MCV RBC AUTO: 88 FL (ref 78–100)
P AXIS - MUSE: 51 DEGREES
PLATELET # BLD AUTO: 247 10E3/UL (ref 150–450)
POTASSIUM SERPL-SCNC: 4.6 MMOL/L (ref 3.4–5.3)
PR INTERVAL - MUSE: 190 MS
QRS DURATION - MUSE: 106 MS
QT - MUSE: 376 MS
QTC - MUSE: 459 MS
R AXIS - MUSE: -36 DEGREES
RBC # BLD AUTO: 4.62 10E6/UL (ref 3.8–5.2)
SODIUM SERPL-SCNC: 141 MMOL/L (ref 135–145)
SYSTOLIC BLOOD PRESSURE - MUSE: NORMAL MMHG
T AXIS - MUSE: 126 DEGREES
VENTRICULAR RATE- MUSE: 90 BPM
WBC # BLD AUTO: 6.9 10E3/UL (ref 4–11)

## 2024-11-25 PROCEDURE — 272N000001 HC OR GENERAL SUPPLY STERILE: Performed by: INTERNAL MEDICINE

## 2024-11-25 PROCEDURE — C1732 CATH, EP, DIAG/ABL, 3D/VECT: HCPCS | Performed by: INTERNAL MEDICINE

## 2024-11-25 PROCEDURE — 99152 MOD SED SAME PHYS/QHP 5/>YRS: CPT | Mod: GC | Performed by: INTERNAL MEDICINE

## 2024-11-25 PROCEDURE — 250N000009 HC RX 250: Performed by: INTERNAL MEDICINE

## 2024-11-25 PROCEDURE — 80048 BASIC METABOLIC PNL TOTAL CA: CPT

## 2024-11-25 PROCEDURE — C1894 INTRO/SHEATH, NON-LASER: HCPCS | Performed by: INTERNAL MEDICINE

## 2024-11-25 PROCEDURE — 250N000011 HC RX IP 250 OP 636: Performed by: INTERNAL MEDICINE

## 2024-11-25 PROCEDURE — 93653 COMPRE EP EVAL TX SVT: CPT | Mod: GC | Performed by: INTERNAL MEDICINE

## 2024-11-25 PROCEDURE — 99152 MOD SED SAME PHYS/QHP 5/>YRS: CPT | Performed by: INTERNAL MEDICINE

## 2024-11-25 PROCEDURE — 99153 MOD SED SAME PHYS/QHP EA: CPT | Performed by: INTERNAL MEDICINE

## 2024-11-25 PROCEDURE — C1887 CATHETER, GUIDING: HCPCS | Performed by: INTERNAL MEDICINE

## 2024-11-25 PROCEDURE — 93653 COMPRE EP EVAL TX SVT: CPT | Performed by: INTERNAL MEDICINE

## 2024-11-25 PROCEDURE — 85027 COMPLETE CBC AUTOMATED: CPT

## 2024-11-25 PROCEDURE — C1730 CATH, EP, 19 OR FEW ELECT: HCPCS | Performed by: INTERNAL MEDICINE

## 2024-11-25 PROCEDURE — 36415 COLL VENOUS BLD VENIPUNCTURE: CPT

## 2024-11-25 PROCEDURE — 258N000003 HC RX IP 258 OP 636: Performed by: NURSE PRACTITIONER

## 2024-11-25 PROCEDURE — 999N000054 HC STATISTIC EKG NON-CHARGEABLE

## 2024-11-25 RX ORDER — SODIUM CHLORIDE 9 MG/ML
INJECTION, SOLUTION INTRAVENOUS CONTINUOUS
Status: DISCONTINUED | OUTPATIENT
Start: 2024-11-25 | End: 2024-11-25 | Stop reason: HOSPADM

## 2024-11-25 RX ORDER — VENLAFAXINE HYDROCHLORIDE 37.5 MG/1
37.5 CAPSULE, EXTENDED RELEASE ORAL DAILY
Status: DISCONTINUED | OUTPATIENT
Start: 2024-11-26 | End: 2024-11-25 | Stop reason: HOSPADM

## 2024-11-25 RX ORDER — OXYCODONE AND ACETAMINOPHEN 5; 325 MG/1; MG/1
1 TABLET ORAL EVERY 4 HOURS PRN
Status: DISCONTINUED | OUTPATIENT
Start: 2024-11-25 | End: 2024-11-25 | Stop reason: HOSPADM

## 2024-11-25 RX ORDER — FENTANYL CITRATE 50 UG/ML
INJECTION, SOLUTION INTRAMUSCULAR; INTRAVENOUS
Status: DISCONTINUED | OUTPATIENT
Start: 2024-11-25 | End: 2024-11-25 | Stop reason: HOSPADM

## 2024-11-25 RX ORDER — LIDOCAINE 40 MG/G
CREAM TOPICAL
Status: DISCONTINUED | OUTPATIENT
Start: 2024-11-25 | End: 2024-11-25 | Stop reason: HOSPADM

## 2024-11-25 RX ORDER — NALOXONE HYDROCHLORIDE 0.4 MG/ML
0.4 INJECTION, SOLUTION INTRAMUSCULAR; INTRAVENOUS; SUBCUTANEOUS
Status: DISCONTINUED | OUTPATIENT
Start: 2024-11-25 | End: 2024-11-25 | Stop reason: HOSPADM

## 2024-11-25 RX ORDER — NALOXONE HYDROCHLORIDE 0.4 MG/ML
0.2 INJECTION, SOLUTION INTRAMUSCULAR; INTRAVENOUS; SUBCUTANEOUS
Status: DISCONTINUED | OUTPATIENT
Start: 2024-11-25 | End: 2024-11-25 | Stop reason: HOSPADM

## 2024-11-25 RX ORDER — LOSARTAN POTASSIUM 100 MG/1
100 TABLET ORAL DAILY
Status: DISCONTINUED | OUTPATIENT
Start: 2024-11-25 | End: 2024-11-25 | Stop reason: HOSPADM

## 2024-11-25 RX ORDER — ONDANSETRON 2 MG/ML
INJECTION INTRAMUSCULAR; INTRAVENOUS
Status: DISCONTINUED | OUTPATIENT
Start: 2024-11-25 | End: 2024-11-25 | Stop reason: HOSPADM

## 2024-11-25 RX ORDER — DIGOXIN 250 MCG
250 TABLET ORAL DAILY
Qty: 90 TABLET | Refills: 2 | Status: SHIPPED | OUTPATIENT
Start: 2024-11-25

## 2024-11-25 RX ORDER — LIDOCAINE 40 MG/G
CREAM TOPICAL
OUTPATIENT
Start: 2024-11-25

## 2024-11-25 RX ORDER — METOPROLOL SUCCINATE 25 MG/1
12.5 TABLET, EXTENDED RELEASE ORAL DAILY
Qty: 90 TABLET | Refills: 3 | Status: SHIPPED | OUTPATIENT
Start: 2024-11-25

## 2024-11-25 RX ADMIN — SODIUM CHLORIDE: 9 INJECTION, SOLUTION INTRAVENOUS at 13:27

## 2024-11-25 ASSESSMENT — ACTIVITIES OF DAILY LIVING (ADL)
ADLS_ACUITY_SCORE: 44
ADLS_ACUITY_SCORE: 42
ADLS_ACUITY_SCORE: 44

## 2024-11-25 NOTE — PRE-PROCEDURE
GENERAL PRE-PROCEDURE:   Procedure:  Atrial Flutter Ablation  Date/Time:  11/25/2024 1:29 PM    Written consent obtained?: Yes    Risks and benefits: Risks, benefits and alternatives were discussed    Consent given by:  Patient  Patient states understanding of procedure being performed: Yes    Patient's understanding of procedure matches consent: Yes    Procedure consent matches procedure scheduled: Yes    Expected level of sedation:  Moderate  Appropriately NPO:  Yes  ASA Class:  3  Mallampati  :  Grade 2- soft palate, base of uvula, tonsillar pillars, and portion of posterior pharyngeal wall visible  Lungs:  Lungs clear with good breath sounds bilaterally  Heart:  Normal heart sounds and rate  History & Physical reviewed:  History and physical reviewed and no updates needed  Statement of review:  I have reviewed the lab findings, diagnostic data, medications, and the plan for sedation

## 2024-11-25 NOTE — PROGRESS NOTES
Pt prepped for atrial flutter ablation. Pt alert and oriented. VSS. Sats >92% on RA. Pt denies any pain. Neuros intact. EKG : NSR. Bilateral groin sites prepped, pulses present and marked. Labs in process. Consent signed. Continue to monitor pt status and notify MD with any changes or concerns.

## 2024-11-25 NOTE — H&P
Electrophysiology Pre-Procedure History and Physical    Neelima Zuniga MRN# [id number]   Age: 79 year old YOB: 1945      Date of Procedure: 11/25/2024 [facility]      Date of Exam 11/25/2024 Facility (Same day)       HPI:  Ms. Zuniga is an 79 year old female who has a medical history significant for SVT , AFL (CHADSVASC 4 on Eliquis), HOCM, HTN, breast CA, and BEATRIZ (uses CPAP). She has newly diagnosed AFL in 6/2024. She had CHAITANYA/DCCV on 7/17/24. Doing well after DCCV noted some symptom improvement in SOB/NDIAYE. She had recurrent AFL and had another DCCV on 9/5/24. Management options were discussed and she elected to pursue AFL ablation for which she presents today.        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 11/29/2022     Priority: Medium    NDIAYE (dyspnea on exertion) 11/29/2022     Priority: Medium    Malignant neoplasm of right breast (H) 09/22/2016     Priority: Medium            Medications (include herbals and vitamins):      Current Facility-Administered Medications   Medication Dose Route Frequency Provider Last Rate Last Admin    sodium chloride 0.9 % infusion   Intravenous Continuous Guy Kinsey MD        sodium chloride 0.9 % infusion   Intravenous Continuous Guy Kinsey MD         Current Outpatient Medications   Medication Sig Dispense Refill    apixaban ANTICOAGULANT (ELIQUIS ANTICOAGULANT) 5 MG tablet Take 1 tablet (5 mg) by mouth 2 times daily 180 tablet 3    calcium-vitamin D (CALTRATE) 600-400 MG-UNIT per tablet Take 1 tablet by mouth 2 times daily      digoxin (LANOXIN) 250 MCG tablet Take 1  tablet (0.25 mg) by mouth daily (Patient not taking: Reported on 9/3/2024) 90 tablet 3    eszopiclone (LUNESTA) 2 MG tablet Take 2 mg by mouth nightly as needed for sleep      Glucosamine Sulfate 1500 MG PACK Take 1,500 mg by mouth daily      Lactobacillus Rhamnosus, GG, ( PROBIOTIC DIGESTIVE CARE) CAPS Take 1 capsule by mouth daily      losartan (COZAAR) 100 MG tablet TAKE 1 TABLET BY MOUTH EVERY DAY for 90 days      MAGNESIUM OXIDE PO Take 400 mg by mouth daily      metoprolol succinate ER (TOPROL XL) 25 MG 24 hr tablet Take 0.5 tablets (12.5 mg) by mouth daily 60 tablet 1    MULTIPLE VITAMIN PO daily      omeprazole (PRILOSEC) 40 MG DR capsule Take 40 mg by mouth every morning      polyethylene glycol (MIRALAX/GLYCOLAX) packet Take 1 packet by mouth daily      ROSUVASTATIN CALCIUM PO Take 10 mg by mouth daily      saline 0.65 % SOLN Spray 1 spray in nostril      venlafaxine (EFFEXOR XR) 37.5 MG 24 hr capsule Take 1 capsule (37.5 mg) by mouth daily 90 capsule 3           Medication List         Notice    Cannot display discharge medications because the patient has not yet been admitted.              Allergies:      Allergies   Allergen Reactions    Erythromycin     Metoprolol Nausea    Seasonal Allergies      Other Reaction(s): Unknown    Sulfamethoxazole-Trimethoprim Hives    Sulfa Antibiotics Rash             Social History:     Social History     Tobacco Use    Smoking status: Never    Smokeless tobacco: Never   Substance Use Topics    Alcohol use: Not Currently            Physical Exam:   All vitals have been reviewed  No data found.  No intake/output data recorded.  Airway assessment:   Patient is able to open mouth wide  Patient is able to stick out tongue}      ENT:   Normocephalic, without obvious abnormality, atraumatic, sinuses nontender on palpation, external ears without lesions, oral pharynx with moist mucous membranes, tonsils without erythema or exudates, gums normal and good dentition.      Neck:   Supple, symmetrical, trachea midline, no adenopathy, thyroid symmetric, not enlarged and no tenderness, skin normal     Lungs:   No increased work of breathing, good air exchange, clear to auscultation bilaterally, no crackles or wheezing     Cardiovascular:   Normal apical impulse, regular rate and rhythm, normal S1 and S2, no S3 or S4, and no murmur noted             Lab / Radiology Results:     Lab Results   Component Value Date    WBC 6.6 11/04/2024    RBC 4.86 11/04/2024    HGB 13.9 11/04/2024    HCT 43.3 11/04/2024    MCV 89 11/04/2024    RDW 13.7 11/04/2024     11/04/2024      Lab Results   Component Value Date    WBC 6.6 11/04/2024     Lab Results   Component Value Date     11/04/2024     Lab Results   Component Value Date    HGB 13.9 11/04/2024    HCT 43.3 11/04/2024     Lab Results   Component Value Date     11/04/2024    CO2 28 11/04/2024    CO2 28 04/06/2022    BUN 15.1 11/04/2024    BUN 14 04/06/2022     Lab Results   Component Value Date     11/04/2024    CO2 28 11/04/2024    CO2 28 04/06/2022    BUN 15.1 11/04/2024    BUN 14 04/06/2022       Lab Results   Component Value Date    TSH 1.88 12/21/2022    TSH 1.33 04/06/2022            Plan:   Patient's active problems diagnostically and therapeutically optimized for the planned procedure. Patient here for AFL ablation. Procedure explained in detail to patient including indications, risks, and benefits. The procedural risk of EP study and ablation were discussed in detail. Risks discussed include: vascular complications, CVA, AVB, pericardial effusion and tamponade. Patient states understanding and wishes to procced.     VALERIE Gordillo CNP  Electrophysiology Consult Service  Securely message with [employer]   Text page via [employer] Paging/Directory

## 2024-11-25 NOTE — Clinical Note
dry, intact, no bleeding and no hematoma. 7fr, 8.5fr sheaths removed from RFV. Closed with figure of eight sutures. 4hrs flat bedrest ordered.

## 2024-11-26 LAB
ATRIAL RATE - MUSE: 84 BPM
DIASTOLIC BLOOD PRESSURE - MUSE: NORMAL MMHG
INTERPRETATION ECG - MUSE: NORMAL
P AXIS - MUSE: 60 DEGREES
PR INTERVAL - MUSE: 202 MS
QRS DURATION - MUSE: 108 MS
QT - MUSE: 352 MS
QTC - MUSE: 415 MS
R AXIS - MUSE: -39 DEGREES
SYSTOLIC BLOOD PRESSURE - MUSE: NORMAL MMHG
T AXIS - MUSE: 128 DEGREES
VENTRICULAR RATE- MUSE: 84 BPM

## 2024-11-26 NOTE — PROGRESS NOTES
D/I/A: Pt roomed on 2A in room 14.  Arrived via litter and accompanied by staff, RN On/Off: On monitor.  VSSA.  Rhythm upon arrival SR on monitor.  Denies pain or sob.  Reviewed activity restrictions and when to notify RN, ie-changes to breathing or increased chest pressure or chest pain.  CCL access:  R groin with figure 8 suture in place, CDI.  P: Continue to monitor status.  Discharge to home once meeting criteria.

## 2024-11-26 NOTE — PROGRESS NOTES
D/I/A:  Pt is tolerating liquids and foods, ambulating, urinating and pt has a  - friend Surekha.  A+O x4 and making needs known.  CCL access sites CDI; no bleeding or hematoma.  CMS intact.  VSS.  SR on monitor.  IV access removed.  Education completed and outlined in AVS: medications reviewed with pt.  Pt is to continue taking all home medications as usual, including digoxin and metoprolol per Dr. Reyes.  All questions answered prior to discharge.  Belongings returned to pt at discharge.    P:  Discharged to self care.  Pt to follow up with appts as per discharge instructions.  Pt brought out to front lobby via wheelchair, accompanied by nurse.

## 2024-12-03 ENCOUNTER — TELEPHONE (OUTPATIENT)
Dept: CARDIOLOGY | Facility: CLINIC | Age: 79
End: 2024-12-03

## 2024-12-03 NOTE — TELEPHONE ENCOUNTER
EP  called the patient to move her time for her ablation time and she's asking to speak to Rose Mary Fall NP.     She has questions about what was done last time and what she can expect this time.     Alisa Wilkinson  Periop Electrophysiology   [phone]

## 2024-12-24 ENCOUNTER — TELEPHONE (OUTPATIENT)
Dept: CARDIOLOGY | Facility: CLINIC | Age: 79
End: 2024-12-24
Payer: MEDICARE

## 2024-12-24 NOTE — TELEPHONE ENCOUNTER
Spoke with patient regarding information and instructions for upcoming AFL abaltion on verified date of procedure: Monday, 12/30 starting at 0530. Patient was given instructions regarding AFL ablation. Discussed purpose, preparation, and procedure with patient. Patient received an instruction letter today for reference. Patient verbalized understanding of information reviewed for pre and post procedure and agreed to call with further questions or concerns. Confirmed upcoming visit on 2/24 with Dayana Ansari and 3/7 with Rose Mary Persaud.     Mark Shelley RN     You are scheduled for an Atrial Flutter Ablation, at [facility]. The hospital is located at [address] [place] [address] [place] the [place].  If you need to cancel this procedure, please call [phone].      Visitor Policy: Two visitors.     Date:_December 30, 2024___Time: _5:30 am Arrive to the Family & Surgery Waiting Lounge on the 3rd Floor at the [facility]     Atrial Flutter Ablation     1. Your history and physical will be completed by our advanced practice provider when you arrive.  2. Please do not eat anything for 8 hours prior to your procedure. You may have sips of water up until 2 hours prior to your arrival.  3. Medications to continue:  - Anticoagulant (Eliquis) : if you take a dose in the morning, please take it before you arrive.   - Take all meds as prescribed, except for those noted below.  4. Medications to hold day of:              - Digoxin and Metoprolol      5. You may discharge the same day. You will need a .      Post-Procedure Instructions  Care of groin site:   Remove the Band-Aid after 24 hours. If there is minor oozing, apply another Band-aid and remove it after 12 hours.    Do NOT take a bath, use a hot tub, pool, or submerse in water for at least 3 days. You may shower.    It is normal to have a small bruise or lump at the site.   Do not scrub the site.   Do not use lotion or powder  near the puncture site for 3 days.     If you start bleeding from the site in your groin: Lie down flat and press firmly on the site. Call your physician immediately, or, come to the emergency room.  Call 911 right away if you have bleeding that is heavy or does not stop.     Call your doctor/provider if:    You have a large or growing hard lump around the site.    The site is red, swollen, hot or tender.    You have chills or a fever greater than 101 F (38 C).    Blood or fluid is draining from the site.    Your leg or arm turns bluish, feels numb or cool.    You have hives, a rash or unusual itching.        Activity Restrictions   For the first 2 days: Do not stoop or squat. When you cough, sneeze or move your bowels, hold your hand over the puncture site and press gently.   Do not lift more than 10 pounds or exertional activity for 10 days.  - No driving for 24 hours after (with or without general anesthesia).         Date: _March 7, 2025 at 1:30 pm____ Follow up appointment with Rose Mary Fall NP on the 3rd Floor at [address] [place] [address]           Please do not hesitate to utilize [employer] or call us if you have any questions or concerns.     Mark Shelley, RN, BSN, PHN  RN Care Coordinator  [facility]  Phone: [phone]  Fax: [phone]      Alisa JORGE Procedure   [phone]           Showering Before Surgery   Your surgeon has asked you to take 2 showers before surgery.  Why is this important?  It is normal for bacteria (germs) to be on your skin. The skin protects us from these germs. When you have surgery, we cut the skin. Sometimes germs get into the cuts and cause infection (illness caused by germs). By following the instructions below and using special soap, you will lower the number of germs on your skin. This decreases your chance of infection.  Special soap  Buy or get 8 ounces of antiseptic surgical soap called 4% CHG. Common name brands of this soap are Hibiclens and Exidine.   You  can find it at your local pharmacy, clinic or retail store. If you have trouble, ask your pharmacist to help you find the right substitute.   A note about shaving:  Do not shave within 12 inches of your incision (surgical cut) area for at least 3 days before surgery. Shaving can make small cuts in the skin. This puts you at a higher risk of infection.  Items you will need for each shower:   1 newly washed towel   4 ounces of one of the above soaps  Follow these instructions:  The evening before surgery   1. Wash your hair and body with your regular shampoo and soap. Make sure you rinse the shampoo and soap from your hair and body.   2. Using clean hands, apply about 2 ounces of soap gently on your skin from the neck to your toes. Use on your groin area last. Do not use this soap on your face or head. If you get any soap in your eyes, ears or mouth, rinse right away.   3. Repeat step 2. It is very important to let the soap stay on your skin for at least 1 minute.   4. Rinse well and dry off using a clean towel.If you feel any tingling, itching or other irritation, rinse right away. It is normal to feel some coolness on the skin after using the antiseptic soap. Your skin may feel a bit dry after the shower, but do not use any lotions, creams or moisturizers. Do not use hair spray or other products in your hair.  5. Dress in freshly washed clothes or pajamas. Use fresh pillowcases and sheets on your bed.  The morning of surgery  1. Wash your hair and body with your regular shampoo and soap. Make sure you rinse the shampoo and soap from your hair and body.   2. Using clean hands, apply about 2 ounces of soap gently on your skin from the neck to your toes. Use on your groin area last. Do not use this soap on your face or head. If you get any soap in your eyes, ears or mouth, rinse right away.   3. Repeat step 2. It is very important to let the soap stay on your skin for at least 1 minute.   4. Rinse well and dry off  using a clean towel.If you feel any tingling, itching or other irritation, rinse right away. It is normal to feel some coolness on the skin after using the antiseptic soap. Your skin may feel a bit dry after the shower, but do not use any lotions, creams or moisturizers. Do not use hair spray or other products in your hair.  5. Dress in clean clothes.  If you have any questions about showering or an allergy to CHG soap, please call the [facility] Nursing Department at the hospital where you are having your surgery.  [facility]: [phone]  [facility]: [phone]  [place]: [phone] or [phone]  [facility]: [phone].  [facility]: [phone]  [place]: [phone]  [facility] ([place]): [phone]  [facility] ([facility]): [phone]  This phone number will be answered between the hours of 8:00 a.m. and 6:30 p.m. Monday through Friday.

## 2024-12-30 ENCOUNTER — ANESTHESIA EVENT (OUTPATIENT)
Dept: CARDIOLOGY | Facility: CLINIC | Age: 79
DRG: 273 | End: 2024-12-30
Payer: MEDICARE

## 2024-12-30 ENCOUNTER — ANESTHESIA (OUTPATIENT)
Dept: CARDIOLOGY | Facility: CLINIC | Age: 79
DRG: 273 | End: 2024-12-30
Payer: MEDICARE

## 2024-12-30 ENCOUNTER — APPOINTMENT (OUTPATIENT)
Dept: GENERAL RADIOLOGY | Facility: CLINIC | Age: 79
DRG: 273 | End: 2024-12-30
Payer: MEDICARE

## 2024-12-30 ENCOUNTER — APPOINTMENT (OUTPATIENT)
Dept: GENERAL RADIOLOGY | Facility: CLINIC | Age: 79
DRG: 273 | End: 2024-12-30
Attending: NURSE PRACTITIONER
Payer: MEDICARE

## 2024-12-30 ENCOUNTER — HOSPITAL ENCOUNTER (INPATIENT)
Facility: CLINIC | Age: 79
LOS: 1 days | Discharge: HOME | DRG: 273 | End: 2024-12-31
Attending: INTERNAL MEDICINE | Admitting: INTERNAL MEDICINE
Payer: MEDICARE

## 2024-12-30 DIAGNOSIS — R06.09 DOE (DYSPNEA ON EXERTION): Primary | ICD-10-CM

## 2024-12-30 DIAGNOSIS — I48.3 TYPICAL ATRIAL FLUTTER (H): ICD-10-CM

## 2024-12-30 LAB
ALBUMIN SERPL BCG-MCNC: 4.1 G/DL (ref 3.5–5.2)
ALP SERPL-CCNC: 83 U/L (ref 40–150)
ALT SERPL W P-5'-P-CCNC: 18 U/L (ref 0–50)
ANION GAP SERPL CALCULATED.3IONS-SCNC: 10 MMOL/L (ref 7–15)
AST SERPL W P-5'-P-CCNC: 21 U/L (ref 0–45)
ATRIAL RATE - MUSE: 80 BPM
BILIRUB DIRECT SERPL-MCNC: <0.2 MG/DL (ref 0–0.3)
BILIRUB SERPL-MCNC: 0.4 MG/DL
BUN SERPL-MCNC: 11.8 MG/DL (ref 8–23)
CALCIUM SERPL-MCNC: 8.8 MG/DL (ref 8.8–10.4)
CHLORIDE SERPL-SCNC: 101 MMOL/L (ref 98–107)
CREAT SERPL-MCNC: 0.79 MG/DL (ref 0.51–0.95)
DIASTOLIC BLOOD PRESSURE - MUSE: NORMAL MMHG
EGFRCR SERPLBLD CKD-EPI 2021: 75 ML/MIN/1.73M2
ERYTHROCYTE [DISTWIDTH] IN BLOOD BY AUTOMATED COUNT: 13.4 % (ref 10–15)
GLUCOSE BLDC GLUCOMTR-MCNC: 151 MG/DL (ref 70–99)
GLUCOSE SERPL-MCNC: 157 MG/DL (ref 70–99)
HCO3 SERPL-SCNC: 28 MMOL/L (ref 22–29)
HCT VFR BLD AUTO: 36.4 % (ref 35–47)
HGB BLD-MCNC: 11.9 G/DL (ref 11.7–15.7)
INTERPRETATION ECG - MUSE: NORMAL
MAGNESIUM SERPL-MCNC: 2.2 MG/DL (ref 1.7–2.3)
MCH RBC QN AUTO: 28.6 PG (ref 26.5–33)
MCHC RBC AUTO-ENTMCNC: 32.7 G/DL (ref 31.5–36.5)
MCV RBC AUTO: 88 FL (ref 78–100)
NT-PROBNP SERPL-MCNC: 493 PG/ML (ref 0–1800)
P AXIS - MUSE: 54 DEGREES
PLATELET # BLD AUTO: 230 10E3/UL (ref 150–450)
POTASSIUM SERPL-SCNC: 4 MMOL/L (ref 3.4–5.3)
PR INTERVAL - MUSE: 188 MS
PROT SERPL-MCNC: 6.2 G/DL (ref 6.4–8.3)
QRS DURATION - MUSE: 94 MS
QT - MUSE: 388 MS
QTC - MUSE: 447 MS
R AXIS - MUSE: -39 DEGREES
RBC # BLD AUTO: 4.16 10E6/UL (ref 3.8–5.2)
SODIUM SERPL-SCNC: 139 MMOL/L (ref 135–145)
SYSTOLIC BLOOD PRESSURE - MUSE: NORMAL MMHG
T AXIS - MUSE: 133 DEGREES
VENTRICULAR RATE- MUSE: 80 BPM
WBC # BLD AUTO: 9.1 10E3/UL (ref 4–11)

## 2024-12-30 PROCEDURE — 4A023CZ MEASUREMENT OF CARDIAC RATE, PERCUTANEOUS APPROACH: ICD-10-PCS | Performed by: INTERNAL MEDICINE

## 2024-12-30 PROCEDURE — 82374 ASSAY BLOOD CARBON DIOXIDE: CPT

## 2024-12-30 PROCEDURE — 36415 COLL VENOUS BLD VENIPUNCTURE: CPT

## 2024-12-30 PROCEDURE — C1732 CATH, EP, DIAG/ABL, 3D/VECT: HCPCS | Performed by: INTERNAL MEDICINE

## 2024-12-30 PROCEDURE — 4A0234Z MEASUREMENT OF CARDIAC ELECTRICAL ACTIVITY, PERCUTANEOUS APPROACH: ICD-10-PCS | Performed by: INTERNAL MEDICINE

## 2024-12-30 PROCEDURE — 258N000003 HC RX IP 258 OP 636: Performed by: NURSE ANESTHETIST, CERTIFIED REGISTERED

## 2024-12-30 PROCEDURE — 999N000134 HC STATISTIC PP CARE STAGE 3: Performed by: NURSE ANESTHETIST, CERTIFIED REGISTERED

## 2024-12-30 PROCEDURE — 02583ZZ DESTRUCTION OF CONDUCTION MECHANISM, PERCUTANEOUS APPROACH: ICD-10-PCS | Performed by: INTERNAL MEDICINE

## 2024-12-30 PROCEDURE — 93653 COMPRE EP EVAL TX SVT: CPT | Mod: GC | Performed by: INTERNAL MEDICINE

## 2024-12-30 PROCEDURE — 83735 ASSAY OF MAGNESIUM: CPT

## 2024-12-30 PROCEDURE — 96374 THER/PROPH/DIAG INJ IV PUSH: CPT | Performed by: NURSE ANESTHETIST, CERTIFIED REGISTERED

## 2024-12-30 PROCEDURE — 250N000009 HC RX 250: Performed by: NURSE ANESTHETIST, CERTIFIED REGISTERED

## 2024-12-30 PROCEDURE — 999N000054 HC STATISTIC EKG NON-CHARGEABLE

## 2024-12-30 PROCEDURE — 370N000017 HC ANESTHESIA TECHNICAL FEE, PER MIN: Performed by: INTERNAL MEDICINE

## 2024-12-30 PROCEDURE — 71046 X-RAY EXAM CHEST 2 VIEWS: CPT

## 2024-12-30 PROCEDURE — 93653 COMPRE EP EVAL TX SVT: CPT | Performed by: INTERNAL MEDICINE

## 2024-12-30 PROCEDURE — C1894 INTRO/SHEATH, NON-LASER: HCPCS | Performed by: INTERNAL MEDICINE

## 2024-12-30 PROCEDURE — 250N000011 HC RX IP 250 OP 636: Performed by: NURSE ANESTHETIST, CERTIFIED REGISTERED

## 2024-12-30 PROCEDURE — 96376 TX/PRO/DX INJ SAME DRUG ADON: CPT | Performed by: NURSE ANESTHETIST, CERTIFIED REGISTERED

## 2024-12-30 PROCEDURE — 93005 ELECTROCARDIOGRAM TRACING: CPT

## 2024-12-30 PROCEDURE — 250N000011 HC RX IP 250 OP 636

## 2024-12-30 PROCEDURE — 250N000009 HC RX 250: Performed by: INTERNAL MEDICINE

## 2024-12-30 PROCEDURE — 93010 ELECTROCARDIOGRAM REPORT: CPT | Mod: XU | Performed by: INTERNAL MEDICINE

## 2024-12-30 PROCEDURE — 02K83ZZ MAP CONDUCTION MECHANISM, PERCUTANEOUS APPROACH: ICD-10-PCS | Performed by: INTERNAL MEDICINE

## 2024-12-30 PROCEDURE — 120N000003 HC R&B IMCU UMMC

## 2024-12-30 PROCEDURE — 272N000001 HC OR GENERAL SUPPLY STERILE: Performed by: INTERNAL MEDICINE

## 2024-12-30 PROCEDURE — 74018 RADEX ABDOMEN 1 VIEW: CPT | Mod: 26 | Performed by: RADIOLOGY

## 2024-12-30 PROCEDURE — 82248 BILIRUBIN DIRECT: CPT

## 2024-12-30 PROCEDURE — C1730 CATH, EP, 19 OR FEW ELECT: HCPCS | Performed by: INTERNAL MEDICINE

## 2024-12-30 PROCEDURE — 250N000013 HC RX MED GY IP 250 OP 250 PS 637

## 2024-12-30 PROCEDURE — 85041 AUTOMATED RBC COUNT: CPT

## 2024-12-30 PROCEDURE — 74018 RADEX ABDOMEN 1 VIEW: CPT

## 2024-12-30 PROCEDURE — 250N000011 HC RX IP 250 OP 636: Performed by: NURSE PRACTITIONER

## 2024-12-30 PROCEDURE — 4A023FZ MEASUREMENT OF CARDIAC RHYTHM, PERCUTANEOUS APPROACH: ICD-10-PCS | Performed by: INTERNAL MEDICINE

## 2024-12-30 PROCEDURE — 83880 ASSAY OF NATRIURETIC PEPTIDE: CPT

## 2024-12-30 PROCEDURE — 80048 BASIC METABOLIC PNL TOTAL CA: CPT

## 2024-12-30 PROCEDURE — 85014 HEMATOCRIT: CPT

## 2024-12-30 PROCEDURE — 71046 X-RAY EXAM CHEST 2 VIEWS: CPT | Mod: 26 | Performed by: RADIOLOGY

## 2024-12-30 PROCEDURE — 36592 COLLECT BLOOD FROM PICC: CPT | Performed by: NURSE ANESTHETIST, CERTIFIED REGISTERED

## 2024-12-30 RX ORDER — ATROPINE SULFATE 0.1 MG/ML
0.5 INJECTION INTRAVENOUS EVERY 5 MIN PRN
Status: ACTIVE | OUTPATIENT
Start: 2024-12-30 | End: 2024-12-30

## 2024-12-30 RX ORDER — FENTANYL CITRATE 50 UG/ML
25-50 INJECTION, SOLUTION INTRAMUSCULAR; INTRAVENOUS
Status: DISCONTINUED | OUTPATIENT
Start: 2024-12-30 | End: 2024-12-30

## 2024-12-30 RX ORDER — NICOTINE POLACRILEX 4 MG
15-30 LOZENGE BUCCAL
Status: DISCONTINUED | OUTPATIENT
Start: 2024-12-30 | End: 2024-12-31 | Stop reason: HOSPADM

## 2024-12-30 RX ORDER — ACETAMINOPHEN 650 MG/1
650 SUPPOSITORY RECTAL EVERY 4 HOURS PRN
Status: DISCONTINUED | OUTPATIENT
Start: 2024-12-30 | End: 2024-12-31 | Stop reason: HOSPADM

## 2024-12-30 RX ORDER — OXYCODONE AND ACETAMINOPHEN 5; 325 MG/1; MG/1
1 TABLET ORAL EVERY 4 HOURS PRN
Status: DISCONTINUED | OUTPATIENT
Start: 2024-12-30 | End: 2024-12-31 | Stop reason: HOSPADM

## 2024-12-30 RX ORDER — ROSUVASTATIN CALCIUM 10 MG/1
10 TABLET, COATED ORAL EVERY EVENING
Status: DISCONTINUED | OUTPATIENT
Start: 2024-12-30 | End: 2024-12-31 | Stop reason: HOSPADM

## 2024-12-30 RX ORDER — FLUMAZENIL 0.1 MG/ML
0.2 INJECTION, SOLUTION INTRAVENOUS
Status: ACTIVE | OUTPATIENT
Start: 2024-12-30 | End: 2024-12-30

## 2024-12-30 RX ORDER — NITROGLYCERIN 0.4 MG/1
0.4 TABLET SUBLINGUAL EVERY 5 MIN PRN
Status: DISCONTINUED | OUTPATIENT
Start: 2024-12-30 | End: 2024-12-31 | Stop reason: HOSPADM

## 2024-12-30 RX ORDER — VENLAFAXINE HYDROCHLORIDE 37.5 MG/1
37.5 CAPSULE, EXTENDED RELEASE ORAL DAILY
Status: DISCONTINUED | OUTPATIENT
Start: 2024-12-31 | End: 2024-12-31 | Stop reason: HOSPADM

## 2024-12-30 RX ORDER — NALOXONE HYDROCHLORIDE 0.4 MG/ML
0.2 INJECTION, SOLUTION INTRAMUSCULAR; INTRAVENOUS; SUBCUTANEOUS
Status: DISCONTINUED | OUTPATIENT
Start: 2024-12-30 | End: 2024-12-31 | Stop reason: HOSPADM

## 2024-12-30 RX ORDER — FUROSEMIDE 10 MG/ML
40 INJECTION INTRAMUSCULAR; INTRAVENOUS ONCE
Status: COMPLETED | OUTPATIENT
Start: 2024-12-30 | End: 2024-12-30

## 2024-12-30 RX ORDER — NALOXONE HYDROCHLORIDE 0.4 MG/ML
0.2 INJECTION, SOLUTION INTRAMUSCULAR; INTRAVENOUS; SUBCUTANEOUS
Status: DISCONTINUED | OUTPATIENT
Start: 2024-12-30 | End: 2024-12-30

## 2024-12-30 RX ORDER — NALOXONE HYDROCHLORIDE 0.4 MG/ML
0.4 INJECTION, SOLUTION INTRAMUSCULAR; INTRAVENOUS; SUBCUTANEOUS
Status: DISCONTINUED | OUTPATIENT
Start: 2024-12-30 | End: 2024-12-31 | Stop reason: HOSPADM

## 2024-12-30 RX ORDER — LIDOCAINE 40 MG/G
CREAM TOPICAL
Status: DISCONTINUED | OUTPATIENT
Start: 2024-12-30 | End: 2024-12-30 | Stop reason: HOSPADM

## 2024-12-30 RX ORDER — LOSARTAN POTASSIUM 100 MG/1
100 TABLET ORAL EVERY EVENING
Status: DISCONTINUED | OUTPATIENT
Start: 2024-12-30 | End: 2024-12-30

## 2024-12-30 RX ORDER — FENTANYL CITRATE 50 UG/ML
INJECTION, SOLUTION INTRAMUSCULAR; INTRAVENOUS PRN
Status: DISCONTINUED | OUTPATIENT
Start: 2024-12-30 | End: 2024-12-30

## 2024-12-30 RX ORDER — ONDANSETRON 2 MG/ML
INJECTION INTRAMUSCULAR; INTRAVENOUS PRN
Status: DISCONTINUED | OUTPATIENT
Start: 2024-12-30 | End: 2024-12-30

## 2024-12-30 RX ORDER — ONDANSETRON 4 MG/1
4 TABLET, ORALLY DISINTEGRATING ORAL EVERY 6 HOURS PRN
Status: DISCONTINUED | OUTPATIENT
Start: 2024-12-30 | End: 2024-12-31 | Stop reason: HOSPADM

## 2024-12-30 RX ORDER — AMOXICILLIN 250 MG
2 CAPSULE ORAL 2 TIMES DAILY
Status: DISCONTINUED | OUTPATIENT
Start: 2024-12-30 | End: 2024-12-31 | Stop reason: HOSPADM

## 2024-12-30 RX ORDER — ONDANSETRON 2 MG/ML
4 INJECTION INTRAMUSCULAR; INTRAVENOUS EVERY 6 HOURS PRN
Status: DISCONTINUED | OUTPATIENT
Start: 2024-12-30 | End: 2024-12-31 | Stop reason: HOSPADM

## 2024-12-30 RX ORDER — LABETALOL 20 MG/4 ML (5 MG/ML) INTRAVENOUS SYRINGE
PRN
Status: DISCONTINUED | OUTPATIENT
Start: 2024-12-30 | End: 2024-12-30

## 2024-12-30 RX ORDER — SODIUM CHLORIDE, SODIUM LACTATE, POTASSIUM CHLORIDE, CALCIUM CHLORIDE 600; 310; 30; 20 MG/100ML; MG/100ML; MG/100ML; MG/100ML
INJECTION, SOLUTION INTRAVENOUS CONTINUOUS PRN
Status: DISCONTINUED | OUTPATIENT
Start: 2024-12-30 | End: 2024-12-30

## 2024-12-30 RX ORDER — LOSARTAN POTASSIUM 100 MG/1
100 TABLET ORAL DAILY
Status: DISCONTINUED | OUTPATIENT
Start: 2024-12-31 | End: 2024-12-31 | Stop reason: HOSPADM

## 2024-12-30 RX ORDER — LIDOCAINE 40 MG/G
CREAM TOPICAL
Status: DISCONTINUED | OUTPATIENT
Start: 2024-12-30 | End: 2024-12-31 | Stop reason: HOSPADM

## 2024-12-30 RX ORDER — AMOXICILLIN 250 MG
1 CAPSULE ORAL 2 TIMES DAILY
Status: DISCONTINUED | OUTPATIENT
Start: 2024-12-30 | End: 2024-12-31 | Stop reason: HOSPADM

## 2024-12-30 RX ORDER — NALOXONE HYDROCHLORIDE 0.4 MG/ML
0.4 INJECTION, SOLUTION INTRAMUSCULAR; INTRAVENOUS; SUBCUTANEOUS
Status: DISCONTINUED | OUTPATIENT
Start: 2024-12-30 | End: 2024-12-30

## 2024-12-30 RX ORDER — MAGNESIUM OXIDE 400 MG/1
400 TABLET ORAL DAILY
Status: DISCONTINUED | OUTPATIENT
Start: 2024-12-30 | End: 2024-12-31 | Stop reason: HOSPADM

## 2024-12-30 RX ORDER — MAGNESIUM HYDROXIDE/ALUMINUM HYDROXICE/SIMETHICONE 120; 1200; 1200 MG/30ML; MG/30ML; MG/30ML
30 SUSPENSION ORAL EVERY 4 HOURS PRN
Status: DISCONTINUED | OUTPATIENT
Start: 2024-12-30 | End: 2024-12-31 | Stop reason: HOSPADM

## 2024-12-30 RX ORDER — DEXTROSE MONOHYDRATE 25 G/50ML
25-50 INJECTION, SOLUTION INTRAVENOUS
Status: DISCONTINUED | OUTPATIENT
Start: 2024-12-30 | End: 2024-12-31 | Stop reason: HOSPADM

## 2024-12-30 RX ORDER — PANTOPRAZOLE SODIUM 40 MG/1
40 TABLET, DELAYED RELEASE ORAL
Status: DISCONTINUED | OUTPATIENT
Start: 2024-12-31 | End: 2024-12-31 | Stop reason: HOSPADM

## 2024-12-30 RX ORDER — PROPOFOL 10 MG/ML
INJECTION, EMULSION INTRAVENOUS PRN
Status: DISCONTINUED | OUTPATIENT
Start: 2024-12-30 | End: 2024-12-30

## 2024-12-30 RX ORDER — DIGOXIN 250 MCG
250 TABLET ORAL DAILY
Status: DISCONTINUED | OUTPATIENT
Start: 2024-12-31 | End: 2024-12-31 | Stop reason: HOSPADM

## 2024-12-30 RX ORDER — ACETAMINOPHEN 325 MG/1
650 TABLET ORAL EVERY 4 HOURS PRN
Status: DISCONTINUED | OUTPATIENT
Start: 2024-12-30 | End: 2024-12-31 | Stop reason: HOSPADM

## 2024-12-30 RX ORDER — LIDOCAINE HYDROCHLORIDE 20 MG/ML
INJECTION, SOLUTION INFILTRATION; PERINEURAL PRN
Status: DISCONTINUED | OUTPATIENT
Start: 2024-12-30 | End: 2024-12-30

## 2024-12-30 RX ORDER — ESZOPICLONE 2 MG/1
2 TABLET, FILM COATED ORAL
Status: DISCONTINUED | OUTPATIENT
Start: 2024-12-30 | End: 2024-12-31 | Stop reason: HOSPADM

## 2024-12-30 RX ADMIN — ONDANSETRON 4 MG: 2 INJECTION INTRAMUSCULAR; INTRAVENOUS at 10:33

## 2024-12-30 RX ADMIN — SODIUM CHLORIDE, POTASSIUM CHLORIDE, SODIUM LACTATE AND CALCIUM CHLORIDE: 600; 310; 30; 20 INJECTION, SOLUTION INTRAVENOUS at 07:55

## 2024-12-30 RX ADMIN — Medication 50 MG: at 08:13

## 2024-12-30 RX ADMIN — PHENYLEPHRINE HYDROCHLORIDE 200 MCG: 10 INJECTION INTRAVENOUS at 08:47

## 2024-12-30 RX ADMIN — PHENYLEPHRINE HYDROCHLORIDE 200 MCG: 10 INJECTION INTRAVENOUS at 08:55

## 2024-12-30 RX ADMIN — FUROSEMIDE 40 MG: 10 INJECTION, SOLUTION INTRAMUSCULAR; INTRAVENOUS at 16:17

## 2024-12-30 RX ADMIN — LABETALOL 20 MG/4 ML (5 MG/ML) INTRAVENOUS SYRINGE 5 MG: at 11:02

## 2024-12-30 RX ADMIN — Medication 200 MG: at 10:36

## 2024-12-30 RX ADMIN — PROPOFOL 150 MG: 10 INJECTION, EMULSION INTRAVENOUS at 08:13

## 2024-12-30 RX ADMIN — Medication 20 MG: at 09:26

## 2024-12-30 RX ADMIN — LIDOCAINE HYDROCHLORIDE 100 MG: 20 INJECTION, SOLUTION INFILTRATION; PERINEURAL at 08:13

## 2024-12-30 RX ADMIN — PHENYLEPHRINE HYDROCHLORIDE 200 MCG: 10 INJECTION INTRAVENOUS at 08:41

## 2024-12-30 RX ADMIN — ROSUVASTATIN CALCIUM 10 MG: 10 TABLET, FILM COATED ORAL at 22:08

## 2024-12-30 RX ADMIN — PHENYLEPHRINE HYDROCHLORIDE 0.5 MCG/KG/MIN: 10 INJECTION INTRAVENOUS at 08:54

## 2024-12-30 RX ADMIN — APIXABAN 5 MG: 5 TABLET, FILM COATED ORAL at 22:08

## 2024-12-30 RX ADMIN — Medication 12.5 MG: at 22:09

## 2024-12-30 RX ADMIN — PHENYLEPHRINE HYDROCHLORIDE 200 MCG: 10 INJECTION INTRAVENOUS at 08:13

## 2024-12-30 RX ADMIN — FUROSEMIDE 40 MG: 10 INJECTION, SOLUTION INTRAMUSCULAR; INTRAVENOUS at 20:10

## 2024-12-30 RX ADMIN — FENTANYL CITRATE 100 MCG: 50 INJECTION INTRAMUSCULAR; INTRAVENOUS at 08:13

## 2024-12-30 RX ADMIN — SENNOSIDES AND DOCUSATE SODIUM 1 TABLET: 50; 8.6 TABLET ORAL at 22:08

## 2024-12-30 ASSESSMENT — ACTIVITIES OF DAILY LIVING (ADL)
ADLS_ACUITY_SCORE: 39
ADLS_ACUITY_SCORE: 26
ADLS_ACUITY_SCORE: 38
ADLS_ACUITY_SCORE: 43
ADLS_ACUITY_SCORE: 39
ADLS_ACUITY_SCORE: 39
ADLS_ACUITY_SCORE: 38
ADLS_ACUITY_SCORE: 39
ADLS_ACUITY_SCORE: 38
ADLS_ACUITY_SCORE: 39
ADLS_ACUITY_SCORE: 38
ADLS_ACUITY_SCORE: 38

## 2024-12-30 ASSESSMENT — ENCOUNTER SYMPTOMS: DYSRHYTHMIAS: 1

## 2024-12-30 NOTE — DISCHARGE INSTRUCTIONS
Post-Ablation Discharge Instructions    For 24 hours:  An adult should stay with you.  Relax and take it easy.  DO NOT make any important legal decisions.  DO NOT drive or operate machines at home or at work.  Resume your regular diet and drink plenty of fluids.    Care of groin site:        Remove the Band-Aid after 24 hours. If there is minor oozing, apply another Band-aid and remove it after 12 hours.         Do NOT take a bath, use a hot tub, pool, or submerse in water for at least 3 days You may shower.         It is normal to have a small bruise or lump at the site.        Do not scrub the site.        Do not use lotion or powder near the puncture site for 3 days.        For the first 2 days: Do not stoop or squat. When you cough, sneeze or move your bowels, hold your hand over the puncture site and press gently.        Do not lift more than 10 pounds or exertional activity for 10 days.      If you start bleeding from the site in your groin:  Lie down flat and press firmly on the site.  Call your physician immediately, or, come to the emergency room.    Call 911 right away if you have bleeding that is heavy or does not stop.     Call your doctor/provider if:        You have a large or growing hard lump around the site.        The site is red, swollen, hot or tender.        Blood or fluid is draining from the site.        You have chills or a fever greater than 101 F (38 C).        Your leg or arm turns bluish, feels numb or cool.        You have hives, a rash or unusual itching.     Cardiovascular Clinic:   [address] [place] [address]. [place] [address]    Your Care Team:  EP Cardiology   Telephone Number     Nurses:   Ruth SLADE [phone]    After business hours: [phone], select option 4, and ask for EP Fellow on-call to be paged.   Non-procedure scheduling:    Anabell SEAMAN   [phone]   Procedure scheduling:    Alisa Wilkinson   [phone]   Device Clinic (Pacemakers, ICDs, Loop  Recorders)    During business hours: [phone]  After business hours:   [phone]- select option 4 and ask for job code [id number].       As always, Thank you for trusting us with your health care needs

## 2024-12-30 NOTE — H&P
Cardiac Electrophysiology Pre-Procedure H&P                                                               2024  Neelima Zuniga MRN: [id number]  Age: 79 year old, : 1945        Procedure(s):     AFL     Plan:     Ms. Zuniga is an 79 year old female who has a medical history significant for SVT , AFL (CHADSVASC 4 on Eliquis), HOCM, HTN, breast CA, and BEATRIZ (uses CPAP). She has newly diagnosed AFL in 2024. She had CHAITANYA/DCCV on 24. Doing well after DCCV noted some symptom improvement in SOB/NDIAYE. She had recurrent AFL and had another DCCV on 24. She underwent a CTI ablation on 2024 but was aborted without achieving bidirectional block due to patient discomfort and apnea under moderate sedation. Plan to bring back patient to complete the CTI line under GA today.       # AFL s/p incomplete CTI ablation due to discomfort moderate sedation    - Proceed with CTI ablation under GA    The nature, risks, benefits, alternatives and anticipated results of the procedure were explained to the patient. These risks include but are not limited to local vascular damage, bleeding, pulmonary vein stenosis, AV block requiring a pacemaker implantation, tamponade and stroke. After careful consideration the patient wished to proceed with the procedure.             Patient discussed with staff attending, Dr. Bowser and the note reflects our joint plan. Please do not hesitate to call with questions or concerns.     Efrain Loyd MD  PGY-8, EP Fellow  Pager: [phone]        History of Present Illness:     Ms. Zuniga is an 79 year old female who has a medical history significant for SVT , AFL (CHADSVASC 4 on Eliquis), HOCM, HTN, breast CA, and BEATRIZ (uses CPAP). She has newly diagnosed AFL in 2024. She had CHAITANYA/DCCV on 24. Doing well after DCCV noted some symptom improvement in SOB/NDIAYE. She had recurrent AFL and had another DCCV on 24. She underwent a CTI ablation on 2024 but was  aborted without achieving bidirectional block due to patient discomfort and apnea under moderate sedation. Plan to bring back patient to complete the CTI line under GA today.     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    NDIAYE (dyspnea on exertion)    HOCM (hypertrophic obstructive cardiomyopathy) (H)    Typical atrial flutter (H)         Past Surgical History:      Past Surgical History:   Procedure Laterality Date    ANESTHESIA CARDIOVERSION N/A 2024    Procedure: Anesthesia cardioversion @1400;  Surgeon: GENERIC ANESTHESIA PROVIDER;  Location: [facility] OR    ANESTHESIA CARDIOVERSION N/A 2024    Procedure: Anesthesia cardioversion@1330;  Surgeon: GENERIC ANESTHESIA PROVIDER;  Location:  OR    BIOPSY BREAST Right 2007    BREAST SURGERY Right     reconstruction    BREAST SURGERY Left     augmentation    CATARACT IOL, RT/LT       SECTION      EP ABLATION ATRIAL FLUTTER N/A 2024    Procedure: EP Ablation Atrial Flutter;  Surgeon: Wilfredo Bowser MD;  Location:  [facility]    ESOPHAGOSCOPY, GASTROSCOPY, DUODENOSCOPY (EGD), COMBINED N/A 3/22/2024    Procedure: ENDOSCOPIC ULTRASOUND, UPPER TRACT/;  Surgeon: Jaime Croft MD;  Location: [facility] [place] OR    ESOPHAGOSCOPY, GASTROSCOPY, DUODENOSCOPY (EGD), COMBINED N/A 3/22/2024    Procedure: ESOPHAGOGASTRODUODENOSCOPY, POLYPECTOMY, GASTRIC AND DUODENAL BIOPSY;  Surgeon: Jaime Croft MD;  Location: [facility] [place] OR    EXAM UNDER ANESTHESIA RECTUM N/A 10/28/2022    Procedure: RECTAL EXAM UNDER ANESTHESIA;  Surgeon: Priscilla Orr MD;  Location: [facility] [place] OR    EXAM UNDER ANESTHESIA, INJECT BOTOX N/A 10/28/2022    Procedure: BOTOX INJECTION, EXCISION PAPILLA;  Surgeon: Priscilla Orr MD;   Location: [facility] OR    FOOT SURGERY Right     HYSTERECTOMY  01/01/2015    MAMMOPLASTY AUGMENTATION Bilateral     MASTECTOMY Right 09/01/2007    MOHS MICROGRAPHIC PROCEDURE      OOPHORECTOMY Bilateral 01/01/2015    SPHINCTEROTOMY RECTUM N/A 10/28/2022    Procedure: POSSIBLE SPHINCTEROTOMY;  Surgeon: Priscilla Orr MD;  Location: [facility] OR         Social History:     Social History     Socioeconomic History    Marital status:      Spouse name: Not on file    Number of children: Not on file    Years of education: Not on file    Highest education level: Not on file   Occupational History    Not on file   Tobacco Use    Smoking status: Never    Smokeless tobacco: Never   Vaping Use    Vaping status: Never Used   Substance and Sexual Activity    Alcohol use: Not Currently    Drug use: No    Sexual activity: Yes     Partners: Male   Other Topics Concern    Parent/sibling w/ CABG, MI or angioplasty before 65F 55M? Not Asked   Social History Narrative    Not on file     Social Drivers of Health     Financial Resource Strain: Not on file   Food Insecurity: Not on file   Transportation Needs: Not on file   Physical Activity: Not on file   Stress: Not on file   Social Connections: Not on file   Interpersonal Safety: Low Risk  (12/30/2024)    Interpersonal Safety     Do you feel physically and emotionally safe where you currently live?: Yes     Within the past 12 months, have you been hit, slapped, kicked or otherwise physically hurt by someone?: No     Within the past 12 months, have you been humiliated or emotionally abused in other ways by your partner or ex-partner?: No   Housing Stability: Not on file         Family History:     Family History   Problem Relation Age of Onset    Macular Degeneration Father     Glaucoma Father     Glaucoma Paternal Grandmother     Diabetes Mother     Hypertension Mother     Hypertension Father     Hyperlipidemia Father          Allergies:     Allergies   Allergen  "Reactions    Erythromycin     Seasonal Allergies      Other Reaction(s): Unknown    Sulfamethoxazole-Trimethoprim Hives    Sulfa Antibiotics Rash         Medications:     Current Facility-Administered Medications   Medication Dose Route Frequency Provider Last Rate Last Admin    lidocaine (LMX4) cream   Topical Q1H PRRose Mary Shin APRN CNP        lidocaine 1 % 0.1-1 mL  0.1-1 mL Other Q1H PRN Rose Mary Persaud APRN CNP        sodium chloride (PF) 0.9% PF flush 3 mL  3 mL Intracatheter Q8H Rose Mary Persaud APRN CNP        sodium chloride (PF) 0.9% PF flush 3 mL  3 mL Intracatheter Q1H PRRose Mary Shin APRN CNP        sodium chloride (PF) 0.9% PF flush 3 mL  3 mL Intracatheter q1 min prRose Mary Shin APRN CNP               Physical Exam:     B/P: 137/57, T: 98.4, P: Data Unavailable, R: Data Unavailable    Wt Readings from Last 4 Encounters:   12/30/24 73 kg (160 lb 15 oz)   11/25/24 71.5 kg (157 lb 10.1 oz)   09/03/24 73.4 kg (161 lb 12.8 oz)   08/07/24 72.8 kg (160 lb 9 oz)       No intake or output data in the 24 hours ending 12/30/24 0725    Gen: AA&Ox3, no acute distress  HEENT:AT/ NC, EOM grossly intact.   PULM/THORAX: Clear to auscultation bilaterally, no rales/rhonchi/wheezes  CV:RRR, S1 and S2 appreciated, no extra heart sounds, murmurs or rub auscultated. No JVD  ABD: Soft, nontender, nondistended. Normoactive bowel sounds  EXT: Warm. No edema, clubbing or cyanosis.   NEURO: No focal deficits on limited exam       Data:     Labs Reviewed on Admission    Troponin No results found for: \"TROPI\", \"TROPONIN\", \"TROPR\", \"TROPN\"  BMPNo lab results found in last 7 days.  CBCNo lab results found in last 7 days.  INRNo lab results found in last 7 days.   Hepatic Panel   Lab Results   Component Value Date    AST 19 11/04/2024     Lab Results   Component Value Date    ALT 21 11/04/2024     No results found for: \"BILICONJ\"   Lab Results   Component Value Date    BILITOTAL 0.5 " 11/04/2024     Lab Results   Component Value Date    ALBUMIN 4.4 11/04/2024    ALBUMIN 4.1 04/06/2022     Lab Results   Component Value Date    PROTTOTAL 6.7 11/04/2024      Lab Results   Component Value Date    ALKPHOS 88 11/04/2024           Most Recent Imaging:     ECG: NSR      TTE:   Global and regional left ventricular function is normal with an EF of 55-60%.  The pattern of wall thickening is consistent with hypertrophic cardiomyopathy.  Dynamic outflow tract obstruction is present due to systolic anterior motion  of the anterior mitral valve leaflet. Dynamic outflow tract obstruction is  present. The peak gradient is 98 mmHg at rest and 107 mmHg with Valsalva.  Right ventricular function, chamber size, wall motion, and thickness are  normal.  IVC diameter >2.1 cm collapsing <50% with sniff suggests a high RA pressure  estimated at 15 mmHg or greater.  No pericardial effusion is present.     This study was compared with the study from 1/19/2024, LV intracavitary  gradient is now better defined.

## 2024-12-30 NOTE — Clinical Note
Potential access sites were evaluated for patency using ultrasound.   The right femoral artery and right femoral vein were selected. Access was obtained under with Sonosite and Fluoroscopic guidance using a micropuncture 21 gauge needle with direct visualization of needle entry.

## 2024-12-30 NOTE — ADDENDUM NOTE
Addendum  created 12/30/24 1413 by Behrens, Christopher J, MD    Child order released for a procedure order, Clinical Note Signed, Intraprocedure Blocks edited, LDA created via procedure documentation, Pend clinical note, SmartForm saved

## 2024-12-30 NOTE — PROGRESS NOTES
D/I/A: Pt roomed on 2A in bay 12.  Arrived via litter and accompanied by PACU RN On/Off: On monitor.  VSSA.  Rhythm upon arrival SR on monitor.  Denies pain or sob.  Reviewed activity restrictions and when to notify RN, ie-changes to breathing or increased chest pressure or chest pain.  CCL access:  right [id number] 8 suture in place-2 hour bedrest complete at 1315.  P: Continue to monitor status.  Discharge to home once meeting criteria.

## 2024-12-30 NOTE — ANESTHESIA PROCEDURE NOTES
Airway       Patient location during procedure: OR       Procedure Start/Stop Times: 12/30/2024 8:15 AM  Staff -        Anesthesiologist:  Behrens, Christopher J, MD       Performed By: anesthesiologist  Consent for Airway        Urgency: elective  Indications and Patient Condition       Indications for airway management: antonia-procedural         Mask difficulty assessment: 1 - vent by mask    Final Airway Details       Final airway type: endotracheal airway       Successful airway: ETT - single  Endotracheal Airway Details        ETT size (mm): 7.5       Cuffed: yes       Successful intubation technique: direct laryngoscopy       DL Blade Type: Sr 2       Grade View of Cords: 3       Adjucts: stylet       Position: Center       Measured from: lips       Bite block used: None    Post intubation assessment        Placement verified by: capnometry and chest rise        Number of attempts at approach: 1       Number of other approaches attempted: 0       Secured with: tape       Ease of procedure: easy       Dentition: Intact    Medication(s) Administered   Medication Administration Time: 12/30/2024 8:15 AM

## 2024-12-30 NOTE — ANESTHESIA CARE TRANSFER NOTE
Patient: Neelima Zuniga    Procedure: Procedure(s):  EP Ablation Atrial Flutter       Diagnosis: Typical atrial flutter (H) [I48.3]  Diagnosis Additional Information: No value filed.    Anesthesia Type:   General     Note:    Oropharynx: oropharynx clear of all foreign objects and spontaneously breathing  Level of Consciousness: awake  Oxygen Supplementation: face mask  Level of Supplemental Oxygen (L/min / FiO2): 8  Independent Airway: airway patency satisfactory and stable  Dentition: dentition unchanged  Vital Signs Stable: post-procedure vital signs reviewed and stable  Report to RN Given: handoff report given  Patient transferred to: PACU    Handoff Report: Identifed the Patient, Identified the Reponsible Provider, Reviewed the pertinent medical history, Discussed the surgical course, Reviewed Intra-OP anesthesia mangement and issues during anesthesia, Set expectations for post-procedure period and Allowed opportunity for questions and acknowledgement of understanding      Vitals:  Vitals Value Taken Time   /66 12/30/24 1115   Temp     Pulse 88 12/30/24 1119   Resp 20 12/30/24 1119   SpO2 92 % 12/30/24 1119   Vitals shown include unfiled device data.    Electronically Signed By: VALERIE El CRNA  December 30, 2024  11:19 AM

## 2024-12-30 NOTE — ANESTHESIA PREPROCEDURE EVALUATION
Anesthesia Pre-Procedure Evaluation    Patient: Neelima Zuniga   MRN: [id number] : 1945        Procedure : Procedure(s):  Anesthesia cardioversion@1330          Past Medical History:   Diagnosis Date    Allergic rhinitis     Amblyopia     left eye    Anxiety     Aortic valve sclerosis     Basal cell carcinoma (BCC) of eye     Infraorbital left    Breast cancer (H)     Cancer (H) 2009    right breast    Diabetes mellitus, type 2 (H)     Dysphagia     Gastric mass     Heart murmur     HTN (hypertension)     Hyperlipidemia     Left ventricular hypertrophy     Left ventricular outflow tract obstruction     Osteopenia     Palpitations     Reflux esophagitis     Sleep apnea     CPAP    SVT (supraventricular tachycardia) (H)     Uterine fibroid       Past Surgical History:   Procedure Laterality Date    ANESTHESIA CARDIOVERSION N/A 2024    Procedure: Anesthesia cardioversion @1400;  Surgeon: GENERIC ANESTHESIA PROVIDER;  Location: [facility] OR    ANESTHESIA CARDIOVERSION N/A 2024    Procedure: Anesthesia cardioversion@1330;  Surgeon: GENERIC ANESTHESIA PROVIDER;  Location: [facility] OR    BIOPSY BREAST Right 2007    BREAST SURGERY Right     reconstruction    BREAST SURGERY Left     augmentation    CATARACT IOL, RT/LT       SECTION      EP ABLATION ATRIAL FLUTTER N/A 2024    Procedure: EP Ablation Atrial Flutter;  Surgeon: Wilfredo Bowser MD;  Location:  [facility]ARD[facility] LAB    ESOPHAGOSCOPY, GASTROSCOPY, DUODENOSCOPY (EGD), COMBINED N/A 3/22/2024    Procedure: ENDOSCOPIC ULTRASOUND, UPPER TRACT/;  Surgeon: Jaime Croft MD;  Location: [facility] [place] OR    ESOPHAGOSCOPY, GASTROSCOPY, DUODENOSCOPY (EGD), COMBINED N/A 3/22/2024    Procedure: ESOPHAGOGASTRODUODENOSCOPY, POLYPECTOMY, GASTRIC AND DUODENAL BIOPSY;  Surgeon: Jaime Croft MD;  Location: [facility] [place] OR    EXAM UNDER ANESTHESIA RECTUM N/A 10/28/2022    Procedure: RECTAL EXAM UNDER ANESTHESIA;  Surgeon: Priscilla Orr  MD;  Location: [facility] OR    EXAM UNDER ANESTHESIA, INJECT BOTOX N/A 10/28/2022    Procedure: BOTOX INJECTION, EXCISION PAPILLA;  Surgeon: Priscilla Orr MD;  Location: [facility] OR    FOOT SURGERY Right     HYSTERECTOMY  01/01/2015    MAMMOPLASTY AUGMENTATION Bilateral     MASTECTOMY Right 09/01/2007    MOHS MICROGRAPHIC PROCEDURE      OOPHORECTOMY Bilateral 01/01/2015    SPHINCTEROTOMY RECTUM N/A 10/28/2022    Procedure: POSSIBLE SPHINCTEROTOMY;  Surgeon: Priscilla Orr MD;  Location: [facility] OR      Allergies   Allergen Reactions    Erythromycin     Seasonal Allergies      Other Reaction(s): Unknown    Sulfamethoxazole-Trimethoprim Hives    Sulfa Antibiotics Rash      Social History     Tobacco Use    Smoking status: Never    Smokeless tobacco: Never   Substance Use Topics    Alcohol use: Not Currently      Wt Readings from Last 1 Encounters:   12/30/24 73 kg (160 lb 15 oz)        Anesthesia Evaluation   Pt has had prior anesthetic. Type: MAC and General.    No history of anesthetic complications       ROS/MED HX  ENT/Pulmonary:     (+) sleep apnea,                                       Neurologic:       Cardiovascular: Comment: Echo 9/3/24    Global and regional left ventricular function is normal with an EF of 55-60%.  The pattern of wall thickening is consistent with hypertrophic cardiomyopathy.  Dynamic outflow tract obstruction is present due to systolic anterior motion  of the anterior mitral valve leaflet. Dynamic outflow tract obstruction is  present. The peak gradient is 98 mmHg at rest and 107 mmHg with Valsalva.  Right ventricular function, chamber size, wall motion, and thickness are  normal.  IVC diameter >2.1 cm collapsing <50% with sniff suggests a high RA pressure  estimated at 15 mmHg or greater.  No pericardial effusion is present.     This study was compared with the study from 1/19/2024, LV intracavitary  gradient is now better defined        (+)  hypertension- -   -  - -  "     CHF                  dysrhythmias, a-flutter,             METS/Exercise Tolerance:     Hematologic:       Musculoskeletal:       GI/Hepatic:       Renal/Genitourinary:       Endo:     (+)  type II DM,                    Psychiatric/Substance Use:       Infectious Disease:       Malignancy:       Other:            Physical Exam    Airway        Mallampati: II   TM distance: > 3 FB   Neck ROM: full   Mouth opening: > 3 cm    Respiratory Devices and Support         Dental       (+) Modest Abnormalities - crowns, retainers, 1 or 2 missing teeth      Cardiovascular          Rhythm and rate: irregular and tachycardia     Pulmonary   pulmonary exam normal                OUTSIDE LABS:  CBC:   Lab Results   Component Value Date    WBC 6.9 11/25/2024    WBC 6.6 11/04/2024    HGB 13.3 11/25/2024    HGB 13.9 11/04/2024    HCT 40.6 11/25/2024    HCT 43.3 11/04/2024     11/25/2024     11/04/2024     BMP:   Lab Results   Component Value Date     11/25/2024     11/04/2024    POTASSIUM 4.6 11/25/2024    POTASSIUM 4.5 11/04/2024    CHLORIDE 103 11/25/2024    CHLORIDE 99 11/04/2024    CO2 26 11/25/2024    CO2 28 11/04/2024    BUN 19.4 11/25/2024    BUN 15.1 11/04/2024    CR 0.61 11/25/2024    CR 0.63 11/04/2024     (H) 11/25/2024     (H) 11/04/2024     COAGS: No results found for: \"PTT\", \"INR\", \"FIBR\"  POC: No results found for: \"BGM\", \"HCG\", \"HCGS\"  HEPATIC:   Lab Results   Component Value Date    ALBUMIN 4.4 11/04/2024    PROTTOTAL 6.7 11/04/2024    ALT 21 11/04/2024    AST 19 11/04/2024    ALKPHOS 88 11/04/2024    BILITOTAL 0.5 11/04/2024     OTHER:   Lab Results   Component Value Date    A1C 7.3 (H) 11/04/2024    BECKY 9.5 11/25/2024    MAG 2.5 (H) 09/05/2024    TSH 1.88 12/21/2022    T4 0.98 07/20/2022       Anesthesia Plan    ASA Status:  3    NPO Status:  Will be NPO Appropriate at ...    Anesthesia Type: General.     - Airway: ETT   Induction: Intravenous.   Maintenance: Inhalation. " "       Consents    Anesthesia Plan(s) and associated risks, benefits, and realistic alternatives discussed. Questions answered and patient/representative(s) expressed understanding.     - Discussed: Risks, Benefits and Alternatives for BOTH SEDATION and the PROCEDURE were discussed     - Discussed with:  Patient      - Extended Intubation/Ventilatory Support Discussed: No.      - Patient is DNR/DNI Status: No     Use of blood products discussed: No .     - Discussed with: Patient.     Postoperative Care    Pain management: IV analgesics.   PONV prophylaxis: Ondansetron (or other 5HT-3), Dexamethasone or Solumedrol     Comments:               Christopher J. Behrens, MD    I have reviewed the pertinent notes and labs in the chart from the past 30 days and (re)examined the patient.  Any updates or changes from those notes are reflected in this note.             # Drug Induced Coagulation Defect: home medication list includes an anticoagulant medication   # DMII: A1C = 7.3 % (Ref range: <5.7 %) within past 6 months  # Overweight: Estimated body mass index is 27.62 kg/m  as calculated from the following:    Height as of this encounter: 1.626 m (5' 4\").    Weight as of this encounter: 73 kg (160 lb 15 oz).      "

## 2024-12-30 NOTE — ANESTHESIA POSTPROCEDURE EVALUATION
Patient: Neelima Zuniga    Procedure: Procedure(s):  EP Ablation Atrial Flutter       Anesthesia Type:  General    Note:  Disposition: Outpatient   Postop Pain Control: Uneventful            Sign Out: Well controlled pain   PONV: No   Neuro/Psych: Uneventful            Sign Out: Acceptable/Baseline neuro status   Airway/Respiratory: Uneventful            Sign Out: Acceptable/Baseline resp. status   CV/Hemodynamics: Uneventful            Sign Out: Acceptable CV status   Other NRE: NONE   DID A NON-ROUTINE EVENT OCCUR? No           Last vitals:  Vitals Value Taken Time   /60 12/30/24 1245   Temp 36.6  C (97.9  F) 12/30/24 1200   Pulse 88 12/30/24 1259   Resp 20 12/30/24 1259   SpO2 95 % 12/30/24 1259   Vitals shown include unfiled device data.    Electronically Signed By: Christopher J. Behrens, MD  December 30, 2024  2:09 PM

## 2024-12-30 NOTE — PROVIDER NOTIFICATION
Page to team RE: difficulty weaning patient off oxygen. Chest x-ray completed and lasix given per orders-see MAR.

## 2024-12-31 ENCOUNTER — APPOINTMENT (OUTPATIENT)
Dept: CARDIOLOGY | Facility: CLINIC | Age: 79
DRG: 273 | End: 2024-12-31
Payer: MEDICARE

## 2024-12-31 VITALS
BODY MASS INDEX: 27.02 KG/M2 | RESPIRATION RATE: 20 BRPM | TEMPERATURE: 98.5 F | SYSTOLIC BLOOD PRESSURE: 121 MMHG | DIASTOLIC BLOOD PRESSURE: 82 MMHG | HEIGHT: 64 IN | WEIGHT: 158.3 LBS | OXYGEN SATURATION: 93 % | HEART RATE: 86 BPM

## 2024-12-31 LAB
ANION GAP SERPL CALCULATED.3IONS-SCNC: 10 MMOL/L (ref 7–15)
BASOPHILS # BLD AUTO: 0 10E3/UL (ref 0–0.2)
BASOPHILS NFR BLD AUTO: 0 %
BUN SERPL-MCNC: 12.7 MG/DL (ref 8–23)
CALCIUM SERPL-MCNC: 8.8 MG/DL (ref 8.8–10.4)
CHLORIDE SERPL-SCNC: 100 MMOL/L (ref 98–107)
CREAT SERPL-MCNC: 0.83 MG/DL (ref 0.51–0.95)
EGFRCR SERPLBLD CKD-EPI 2021: 71 ML/MIN/1.73M2
EOSINOPHIL # BLD AUTO: 0.2 10E3/UL (ref 0–0.7)
EOSINOPHIL NFR BLD AUTO: 2 %
ERYTHROCYTE [DISTWIDTH] IN BLOOD BY AUTOMATED COUNT: 13.5 % (ref 10–15)
GLUCOSE BLDC GLUCOMTR-MCNC: 182 MG/DL (ref 70–99)
GLUCOSE SERPL-MCNC: 167 MG/DL (ref 70–99)
HCO3 SERPL-SCNC: 30 MMOL/L (ref 22–29)
HCT VFR BLD AUTO: 36.3 % (ref 35–47)
HGB BLD-MCNC: 12 G/DL (ref 11.7–15.7)
IMM GRANULOCYTES # BLD: 0 10E3/UL
IMM GRANULOCYTES NFR BLD: 0 %
LVEF ECHO: NORMAL
LYMPHOCYTES # BLD AUTO: 1.5 10E3/UL (ref 0.8–5.3)
LYMPHOCYTES NFR BLD AUTO: 15 %
MAGNESIUM SERPL-MCNC: 2.5 MG/DL (ref 1.7–2.3)
MCH RBC QN AUTO: 28.8 PG (ref 26.5–33)
MCHC RBC AUTO-ENTMCNC: 33.1 G/DL (ref 31.5–36.5)
MCV RBC AUTO: 87 FL (ref 78–100)
MONOCYTES # BLD AUTO: 1.1 10E3/UL (ref 0–1.3)
MONOCYTES NFR BLD AUTO: 11 %
NEUTROPHILS # BLD AUTO: 7 10E3/UL (ref 1.6–8.3)
NEUTROPHILS NFR BLD AUTO: 72 %
NRBC # BLD AUTO: 0 10E3/UL
NRBC BLD AUTO-RTO: 0 /100
PLATELET # BLD AUTO: 212 10E3/UL (ref 150–450)
POTASSIUM SERPL-SCNC: 4.2 MMOL/L (ref 3.4–5.3)
RBC # BLD AUTO: 4.16 10E6/UL (ref 3.8–5.2)
SODIUM SERPL-SCNC: 140 MMOL/L (ref 135–145)
WBC # BLD AUTO: 9.8 10E3/UL (ref 4–11)

## 2024-12-31 PROCEDURE — 83735 ASSAY OF MAGNESIUM: CPT

## 2024-12-31 PROCEDURE — 250N000013 HC RX MED GY IP 250 OP 250 PS 637

## 2024-12-31 PROCEDURE — 93306 TTE W/DOPPLER COMPLETE: CPT | Mod: 26 | Performed by: INTERNAL MEDICINE

## 2024-12-31 PROCEDURE — 250N000011 HC RX IP 250 OP 636

## 2024-12-31 PROCEDURE — 93306 TTE W/DOPPLER COMPLETE: CPT

## 2024-12-31 PROCEDURE — 80048 BASIC METABOLIC PNL TOTAL CA: CPT

## 2024-12-31 PROCEDURE — 85004 AUTOMATED DIFF WBC COUNT: CPT

## 2024-12-31 PROCEDURE — 85041 AUTOMATED RBC COUNT: CPT

## 2024-12-31 PROCEDURE — 250N000012 HC RX MED GY IP 250 OP 636 PS 637

## 2024-12-31 PROCEDURE — 82565 ASSAY OF CREATININE: CPT

## 2024-12-31 PROCEDURE — 99236 HOSP IP/OBS SAME DATE HI 85: CPT | Mod: 25

## 2024-12-31 PROCEDURE — 36415 COLL VENOUS BLD VENIPUNCTURE: CPT

## 2024-12-31 RX ORDER — FUROSEMIDE 10 MG/ML
20 INJECTION INTRAMUSCULAR; INTRAVENOUS ONCE
Status: COMPLETED | OUTPATIENT
Start: 2024-12-31 | End: 2024-12-31

## 2024-12-31 RX ORDER — METOPROLOL SUCCINATE 25 MG/1
12.5 TABLET, EXTENDED RELEASE ORAL EVERY EVENING
COMMUNITY
Start: 2024-12-31

## 2024-12-31 RX ORDER — POLYETHYLENE GLYCOL 3350 17 G/17G
17 POWDER, FOR SOLUTION ORAL DAILY
Status: DISCONTINUED | OUTPATIENT
Start: 2024-12-31 | End: 2024-12-31 | Stop reason: HOSPADM

## 2024-12-31 RX ADMIN — POLYETHYLENE GLYCOL 3350 17 G: 17 POWDER, FOR SOLUTION ORAL at 11:45

## 2024-12-31 RX ADMIN — LOSARTAN POTASSIUM 100 MG: 100 TABLET, FILM COATED ORAL at 08:40

## 2024-12-31 RX ADMIN — ACETAMINOPHEN 650 MG: 325 TABLET, FILM COATED ORAL at 03:55

## 2024-12-31 RX ADMIN — FUROSEMIDE 20 MG: 10 INJECTION, SOLUTION INTRAMUSCULAR; INTRAVENOUS at 09:06

## 2024-12-31 RX ADMIN — DIGOXIN 250 MCG: 250 TABLET ORAL at 08:39

## 2024-12-31 RX ADMIN — ACETAMINOPHEN 650 MG: 325 TABLET, FILM COATED ORAL at 08:46

## 2024-12-31 RX ADMIN — VENLAFAXINE HYDROCHLORIDE 37.5 MG: 37.5 CAPSULE, EXTENDED RELEASE ORAL at 08:39

## 2024-12-31 RX ADMIN — APIXABAN 5 MG: 5 TABLET, FILM COATED ORAL at 08:37

## 2024-12-31 RX ADMIN — PANTOPRAZOLE SODIUM 40 MG: 40 TABLET, DELAYED RELEASE ORAL at 08:38

## 2024-12-31 RX ADMIN — SENNOSIDES AND DOCUSATE SODIUM 1 TABLET: 50; 8.6 TABLET ORAL at 08:37

## 2024-12-31 RX ADMIN — INSULIN ASPART 1 UNITS: 100 INJECTION, SOLUTION INTRAVENOUS; SUBCUTANEOUS at 11:42

## 2024-12-31 RX ADMIN — INSULIN ASPART 1 UNITS: 100 INJECTION, SOLUTION INTRAVENOUS; SUBCUTANEOUS at 08:37

## 2024-12-31 ASSESSMENT — ACTIVITIES OF DAILY LIVING (ADL)
ADLS_ACUITY_SCORE: 32
ADLS_ACUITY_SCORE: 36
ADLS_ACUITY_SCORE: 32
ADLS_ACUITY_SCORE: 32
ADLS_ACUITY_SCORE: 36
ADLS_ACUITY_SCORE: 32
ADLS_ACUITY_SCORE: 36
ADLS_ACUITY_SCORE: 32
ADLS_ACUITY_SCORE: 32
ADLS_ACUITY_SCORE: 36

## 2024-12-31 NOTE — PLAN OF CARE
Status post aflutter ablation; currently sinus rhythm c/b need for supplemental oxygen and edema.    Hx: SVT and A flutter on apixaban, hypertrophic cardiomyopathy, HTN, BEATRIZ on CPAP, and hx of breast cancer.    AO, sinus HR 80's, 1-2 L NC, +2 edema, diuresing with IV lasix, if able to sustain oxygen levels, pt will discharge home today. SBA, good UOP. Last BM 12/30.    Plan: Discharge home when able to sustain oxygen saturation above 92% on room air; diuresing with IV Lasix.

## 2024-12-31 NOTE — PLAN OF CARE
DISCHARGE   Discharged to: Home  Via: Automobile  Accompanied by: Family  Discharge Instructions: diet, activity, medications, follow up appointments, when to call the MD, and what to watchout for (i.e. s/s of infection, increasing SOB, palpitations, chest pain,)  Prescriptions: To be filled by [facility]  pharmacy per pt's request; medication list reviewed & sent with pt  Follow Up Appointments: arranged; information given  Belongings: All sent with pt  IV: out  Telemetry: off  Pt exhibits understanding of above discharge instructions; all questions answered.  Discharge Paperwork: faxed

## 2024-12-31 NOTE — H&P
History and Physical    Cards1      Date of Service: 24  Date of Admission: 2024  Patient Name: Neelima Zuniga  : 1945  MRN: [id number]       Chief complaint:   Hypoxemia after procedure     History of Present Illness:   Neelima Zuniga is a 79 year old female with PMH of SVT and A flutter on apixaban, hypertrophic cardiomyopathy, HTN, BEATRIZ on CPAP, and hx of breast cancer who presents with hypoxemia post atrial flutter ablation c/f new heart failure.    She has experienced shortness of breath on exertion for at least a year. Her walking pace is slow and she could not catch up with her peers. She however could exercise at the gym without SOB. Has been using additional pillow due to neck problem. No PND. No leg swelling until today. Has gained 5-10 lbs this year per pt report. Reports intermittent chest pain that does not aggravate from exertion. No fever, chills, URI, cough.     She has history of HCM with obstruction and atrial flutter. She did not usually feel palpitation. Today, she came in for a scheduled atrial ablation which was done successfully. Post-procedure, could not wean off oxygen. Cxr found significant pulmonary edema. S/p Lasix 40 mg IV with good urine output.    Never smoked. Denies alcohol/drug uses.  Lives alone.   3 years ago. No children. Has a friend lives near by.     Review of Symptoms:     Comprehensive 10 point review of systems was negative unless otherwise noted in the HPI.     Past Medical History:     Past Medical History:   Diagnosis Date    Allergic rhinitis     Amblyopia     left eye    Anxiety     Aortic valve sclerosis     Basal cell carcinoma (BCC) of eye 2007    Infraorbital left    Breast cancer (H) 2007    Cancer (H) 2009    right breast    Diabetes mellitus, type 2 (H)     Dysphagia     Gastric mass     Heart murmur     HTN (hypertension)     Hyperlipidemia     Left ventricular hypertrophy     Left ventricular outflow tract obstruction      Osteopenia     Palpitations     Reflux esophagitis     Sleep apnea     CPAP    SVT (supraventricular tachycardia) (H)     Uterine fibroid        Past Surgical History:   Procedure Laterality Date    ANESTHESIA CARDIOVERSION N/A 2024    Procedure: Anesthesia cardioversion @1400;  Surgeon: GENERIC ANESTHESIA PROVIDER;  Location: [facility] OR    ANESTHESIA CARDIOVERSION N/A 2024    Procedure: Anesthesia cardioversion@1330;  Surgeon: GENERIC ANESTHESIA PROVIDER;  Location: [facility] OR    BIOPSY BREAST Right 2007    BREAST SURGERY Right     reconstruction    BREAST SURGERY Left     augmentation    CATARACT IOL, RT/LT       SECTION      EP ABLATION ATRIAL FLUTTER N/A 2024    Procedure: EP Ablation Atrial Flutter;  Surgeon: Wilfredo Bowser MD;  Location:  [facility]    ESOPHAGOSCOPY, GASTROSCOPY, DUODENOSCOPY (EGD), COMBINED N/A 3/22/2024    Procedure: ENDOSCOPIC ULTRASOUND, UPPER TRACT/;  Surgeon: Jaime Croft MD;  Location: [facility]    ESOPHAGOSCOPY, GASTROSCOPY, DUODENOSCOPY (EGD), COMBINED N/A 3/22/2024    Procedure: ESOPHAGOGASTRODUODENOSCOPY, POLYPECTOMY, GASTRIC AND DUODENAL BIOPSY;  Surgeon: Jaime Croft MD;  Location: [facility] OR    EXAM UNDER ANESTHESIA RECTUM N/A 10/28/2022    Procedure: RECTAL EXAM UNDER ANESTHESIA;  Surgeon: Priscilla Orr MD;  Location: [facility] OR    EXAM UNDER ANESTHESIA, INJECT BOTOX N/A 10/28/2022    Procedure: BOTOX INJECTION, EXCISION PAPILLA;  Surgeon: Priscilla Orr MD;  Location: [facility] OR    FOOT SURGERY Right     HYSTERECTOMY  2015    MAMMOPLASTY AUGMENTATION Bilateral     MASTECTOMY Right 2007    MOHS MICROGRAPHIC PROCEDURE      OOPHORECTOMY Bilateral 2015    SPHINCTEROTOMY RECTUM N/A 10/28/2022    Procedure: POSSIBLE SPHINCTEROTOMY;  Surgeon: Priscilla Orr MD;  Location: [facility]        Allergies:     Allergies   Allergen Reactions    Erythromycin     Seasonal Allergies       Other Reaction(s): Unknown    Sulfamethoxazole-Trimethoprim Hives    Sulfa Antibiotics Rash        Outpatient Medications:     Current Facility-Administered Medications   Medication Dose Route Frequency Provider Last Rate Last Admin    acetaminophen (TYLENOL) Suppository 650 mg  650 mg Rectal Q4H PRN Keanu Antonio MD        acetaminophen (TYLENOL) tablet 650 mg  650 mg Oral Q4H PRN Keanu Antonio MD        alum & mag hydroxide-simethicone (MAALOX) suspension 30 mL  30 mL Oral Q4H PRN Keanu Antonio MD        apixaban ANTICOAGULANT (ELIQUIS) tablet 5 mg  5 mg Oral BID Keanu Antonio MD        atropine injection 0.5 mg  0.5 mg Intravenous Q5 Min PRN Efrain Loyd MD        [START ON 12/31/2024] digoxin (LANOXIN) tablet 250 mcg  250 mcg Oral Daily Keanu Antonio MD        eszopiclone (LUNESTA) tablet 2 mg  2 mg Oral At Bedtime PRN Keanu Antonio MD        flumazenil (ROMAZICON) injection 0.2 mg  0.2 mg Intravenous q1 min prn Efrain Loyd MD        lidocaine (LMX4) cream   Topical Q1H PRN Keanu Antonio MD        lidocaine 1 % 0.1-1 mL  0.1-1 mL Other Q1H PRN Keanu Antonio MD        [Held by provider] losartan (COZAAR) tablet 100 mg  100 mg Oral QPM Keanu Antonio MD        [Held by provider] magnesium oxide (MAG-OX) tablet 400 mg  400 mg Oral Daily Keanu Antonio MD        medication instruction   Does not apply Continuous PRN Keanu Antonio MD        metoprolol succinate ER (TOPROL-XL) 24 hr half-tab 12.5 mg  12.5 mg Oral QPM Keanu Antonio MD        naloxone (NARCAN) injection 0.2 mg  0.2 mg Intravenous Q2 Min PRN Efrain Loyd MD        Or    naloxone (NARCAN) injection 0.4 mg  0.4 mg Intravenous Q2 Min PRN Efrain Loyd MD        Or    naloxone (NARCAN) injection 0.2 mg  0.2 mg Intramuscular Q2 Min PRN Efrain Loyd MD        Or     naloxone (NARCAN) injection 0.4 mg  0.4 mg Intramuscular Q2 Min PRN Efrain Loyd MD        nitroGLYcerin (NITROSTAT) sublingual tablet 0.4 mg  0.4 mg Sublingual Q5 Min PRN Keanu Antonio MD        ondansetron (ZOFRAN ODT) ODT tab 4 mg  4 mg Oral Q6H PRN Keanu Antonio MD        Or    ondansetron (ZOFRAN) injection 4 mg  4 mg Intravenous Q6H PRN Keanu Antonio MD        oxyCODONE-acetaminophen (PERCOCET) 5-325 MG per tablet 1 tablet  1 tablet Oral Q4H PRN Efrain Loyd MD        [START ON 12/31/2024] pantoprazole (PROTONIX) EC tablet 40 mg  40 mg Oral BID AC Keanu Antonio MD        Patient is already receiving anticoagulation with heparin, enoxaparin (LOVENOX), warfarin (COUMADIN)  or other anticoagulant medication   Does not apply Continuous PRN Keanu Antonio MD        rosuvastatin (CRESTOR) tablet 10 mg  10 mg Oral QPM Keanu Antonio MD        sensayra-docusate (SENOKOT-S/PERICOLACE) 8.6-50 MG per tablet 1 tablet  1 tablet Oral BID Keanu Antonio MD        Or    senna-docusate (SENOKOT-S/PERICOLACE) 8.6-50 MG per tablet 2 tablet  2 tablet Oral BID Keanu Antonio MD        sodium chloride (PF) 0.9% PF flush 3 mL  3 mL Intracatheter Q8H Keanu Antonio MD        sodium chloride (PF) 0.9% PF flush 3 mL  3 mL Intracatheter q1 min prn Keanu Antonio MD        [START ON 12/31/2024] venlafaxine (EFFEXOR XR) 24 hr capsule 37.5 mg  37.5 mg Oral Daily Keanu Antonio MD            Family History:     Family History   Problem Relation Age of Onset    Macular Degeneration Father     Glaucoma Father     Glaucoma Paternal Grandmother     Diabetes Mother     Hypertension Mother     Hypertension Father     Hyperlipidemia Father         Social History:     Social History     Tobacco Use    Smoking status: Never    Smokeless tobacco: Never   Vaping Use    Vaping status: Never Used  "  Substance Use Topics    Alcohol use: Not Currently    Drug use: No      Physical Exam:   Blood pressure 131/65, pulse 112, temperature 100  F (37.8  C), temperature source Oral, resp. rate 24, height 1.626 m (5' 4\"), weight 73.6 kg (162 lb 4.8 oz), SpO2 92%.  Temp (24hrs), Av.7  F (37.1  C), Min:97.9  F (36.6  C), Max:100  F (37.8  C)    Gen: no acute distress, on oxygen NC, alert, oriented, sitting 45* in the bed, talks in a complete sentence  HEENT: not pale, no jaundice  NECK: JVP to mandible, at least 15 cm  CARDIOVASCULAR: regular, PSM gr IV loudest at apex  RESPIRATORY: clear anterior lung, diminished and fine crepitation both posterior lungs 2/3  ABDOMEN: tense, no tenderness, marked distended without fluid wave  EXTREMITIES: edema 2+ both ankles  NEURO: grossly intact, equal limbs movement      Data:   CMP  Recent Labs   Lab 24      POTASSIUM 4.0   CHLORIDE 101   CO2 28   ANIONGAP 10   *   BUN 11.8   CR 0.79   GFRESTIMATED 75   BECKY 8.8   MAG 2.2   PROTTOTAL 6.2*   ALBUMIN 4.1   BILITOTAL 0.4   ALKPHOS 83   AST 21   ALT 18     CBC  Recent Labs   Lab 24   WBC 9.1   RBC 4.16   HGB 11.9   HCT 36.4   MCV 88   MCH 28.6   MCHC 32.7   RDW 13.4        INRNo lab results found in last 7 days.  Arterial Blood GasNo lab results found in last 7 days.     EK/30 - sinus rhythm rate 80 with PVC, STD and TWI lateral leads - unchanged from prior    Imaging:    CXR  - patchy densities lower lungs - edema  ABX  - large bowel ileus, no visible volvulus    Cardiac MR 3/2024 - severe LV hypertrophy, max thickness 1.7 cm, LVOT flow obstruction with no anterior septal motion of mitral valve, no rest perfusion defect - HMC vs hypertensive CM. Not consistent with amyloid.    Echocardiogram 2024 - LVEF 55-60% with hypertrophic CM. Dynamic outflow tract obstruction with CANDIDA.      Assessment/Plan:   Neelima Zuniga is a 79 year old female with PMH of SVT and A flutter " on apixaban, hypertrophic cardiomyopathy, HTN, BEATRIZ on CPAP, and hx of DCIS/LCIS R breast s/p surgery who presents with hypoxemia post Aflut ablation c/f new heart failure.    # HFpEF, new diagnosis  # Hypertrophic cardiomyopathy  Long standing history of hypertrophic CM without heart failure. Reports SOB on exertion ~ 1 year and orthopnea. No chest pain. Last echo 9/2024 showed LVEF 55-60% with severe LV hypertrophy and high RA pressure. Was not on diuretics. Presented today after the Aflut ablation with hypoxemia. On exam, high JVP and crackles both lungs, leg 2+ edema. CXR is consistent with pulmonary edema. S/p lasix with good response.   Etiologies are likely from hypertrophic CM vs tachycardia-induced.     - s/p lasix 40 mg IV x 2  - Unclear EDW, current weight 162 lbs  - strict I/O, body weight daily, BMP daily  - BNP   - Echocardiogram   - GDMT  - PTA Losartan 100 mg    - PTA metoprolol succinate 12.5 mg daily   - Not currently on MRA/SGLT2i - consider starting this admission    # A flutter s/p ablation 12/30/2024  # SVT  # Chronic anticoagulation  She has newly diagnosed AFL in 6/2024. She had CHAITANYA/DCCV on 7/17/24, 9/5/2024 but with recurrent. Attempted CT ablation in 9/2024 but aborted due to patient discomfort and apnea. S/p successful CTI ablation with achievement of bidirectional block 12/30/2024.  CHADSVASC 4 on apixaban. Currently in sinus.    - PTA apixaban 5 mg BID, per EP  - PTA digoxin 250 mcg daily  - Beta blocker as above    # Hyperlipidemia  LDL (11/2024) = 89,   - PTA rosuvastatin 10 mg daily    #T2DM  A1C 7.3% (11/2024). Not on medication.    - POCT glucose TID, HS  - Low dose sliding scale insulin  - Hypoglycemia protocol    # Ileus  Marked distention of abdomen on exam. Patient has no complains and reports chronic abdominal distention.  Abx showed possible large bowel ileus.    - Bowel regimen    # Anxiety   - PTA Venlafaxine 37.5 mg daily    # Hx of DCIS and LCIS R breast, in  remission  S/p R mastectomy 2017 without chemotherapy. Could not tolerate raloxifene.     Code Status: Full Code, confirmed on admission  Discharge plan: TBD    Will be formally staffed in the AM.    Keanu Antonio MD  Internal Medicine, PGY-2  [phone]  December 30, 2024 9:19 PM

## 2024-12-31 NOTE — PROGRESS NOTES
Patient has been assessed for oxygen destaturation post procedural. Oxygen readings:    *Pulse oximetry (SpO2) = 86-92% on room air at rest while awake.    *SpO2 improved to 95% on 1.5 liters/minute at rest.    *SpO2 = 76% on room air during activity/with exercise.    EP fellow notified, planned for overnight admission for diuresis.

## 2024-12-31 NOTE — PROGRESS NOTES
Transfer  Transferred to:  6C   Via: bed  Reason for transfer: Pt to be admitted overnight for further monitoring post procedure  Family: Aware of transfer  Belongings: Sent with pt  Chart: Sent with pt  Report called to: JOCE Gibson  R groin site remains stable, no bleeding or hematoma.  VSS.  Pt remains on 1-1.5L O2 via NC to keep O2 sats >90%.  Second dose of IV lasix given around 2000.  Labs also drawn at this time - pending.  Pt aware and understands of current plan to admit overnight for observation until pt able to wean off O2.

## 2024-12-31 NOTE — PLAN OF CARE
"Goal Outcome Evaluation:  Shift 1900 - 0730    Primary team: CARDS-1    NEURO: A&O x4. Pt is calm and cooperative, calls appropriately.   RESPIRATORY: 2.5 L NC sating > 92% over NOC. Pt denies SOB.  CARDIAC: WDL, VSS. Pt denies chest pain and dizziness.  GI/: 1 BM this shift. Pt voiding independently in bathroom.   SKIN: Dry and flaky, no new deficits noted.   PAIN: Pt rates pain 0-2/10. PRN tylenol administered for general chest soreness following procedure.   MOBILITY: SBA, pt is steady on her feet.   DIET: Low sat fat < 2400 mg, tolerating well.   LDAs: 2 L PIVs SL. R groin site WDL.    PLAN: Continue with POC. Notify primary care team with any concerns/updates.     Intake/Output Summary (Last 24 hours) at 12/31/2024 2057  Last data filed at 12/31/2024 0344  Gross per 24 hour   Intake 1660 ml   Output 1650 ml   Net 10 ml     /82 (BP Location: Left arm)   Pulse 92   Temp 99  F (37.2  C) (Oral)   Resp 22   Ht 1.626 m (5' 4\")   Wt 74.1 kg (163 lb 4.8 oz)   SpO2 94%   BMI 28.03 kg/m       Problem: Adult Inpatient Plan of Care  Goal: Plan of Care Review  Description: The Plan of Care Review/Shift note should be completed every shift.  The Outcome Evaluation is a brief statement about your assessment that the patient is improving, declining, or no change.  This information will be displayed automatically on your shift  note.  Outcome: Progressing  Flowsheets (Taken 12/31/2024 6049)  Outcome Evaluation: pt sating > 92% on 2.5 L over NOC. denies SOB with activity.  Plan of Care Reviewed With: patient  Overall Patient Progress: improving  Goal: Patient-Specific Goal (Individualized)  Description: You can add care plan individualizations to a care plan. Examples of Individualization might be:  \"Parent requests to be called daily at 9am for status\", \"I have a hard time hearing out of my right ear\", or \"Do not touch me to wake me up as it startles  me\".  Outcome: Progressing  Goal: Absence of Hospital-Acquired " Illness or Injury  Outcome: Progressing  Intervention: Identify and Manage Fall Risk  Recent Flowsheet Documentation  Taken 12/30/2024 2053 by Paige Grady RN  Safety Promotion/Fall Prevention:   activity supervised   assistive device/personal items within reach   clutter free environment maintained   increased rounding and observation   increase visualization of patient   lighting adjusted   nonskid shoes/slippers when out of bed   patient and family education   room near nurse's station   room organization consistent   safety round/check completed  Intervention: Prevent Skin Injury  Recent Flowsheet Documentation  Taken 12/31/2024 0344 by Paige Grady RN  Body Position:   position changed independently   supine, head elevated   weight shifting  Taken 12/30/2024 2321 by Paige Grady RN  Body Position:   position changed independently   supine, head elevated   weight shifting  Taken 12/30/2024 2053 by Paige Grady RN  Skin Protection:   adhesive use limited   incontinence pads utilized   tubing/devices free from skin contact  Intervention: Prevent and Manage VTE (Venous Thromboembolism) Risk  Recent Flowsheet Documentation  Taken 12/30/2024 2053 by Paige Grady RN  VTE Prevention/Management: SCDs off (sequential compression devices)  Intervention: Prevent Infection  Recent Flowsheet Documentation  Taken 12/30/2024 2053 by Paige Grady RN  Infection Prevention:   environmental surveillance performed   hand hygiene promoted   personal protective equipment utilized   rest/sleep promoted   single patient room provided  Goal: Optimal Comfort and Wellbeing  Outcome: Progressing  Intervention: Monitor Pain and Promote Comfort  Recent Flowsheet Documentation  Taken 12/31/2024 0355 by Paige Grady RN  Pain Management Interventions:   medication (see MAR)   care clustered   emotional support   environmental changes   pillow support provided   repositioned   rest  Goal: Readiness for Transition of  Care  Outcome: Progressing  Intervention: Mutually Develop Transition Plan  Recent Flowsheet Documentation  Taken 12/30/2024 2200 by Paige Grady RN  Equipment Currently Used at Home: none     Problem: Fall Injury Risk  Goal: Absence of Fall and Fall-Related Injury  Outcome: Progressing  Intervention: Identify and Manage Contributors  Recent Flowsheet Documentation  Taken 12/30/2024 2053 by Paige Grady RN  Medication Review/Management:   medications reviewed   high-risk medications identified  Intervention: Promote Injury-Free Environment  Recent Flowsheet Documentation  Taken 12/30/2024 2053 by Paige Grady RN  Safety Promotion/Fall Prevention:   activity supervised   assistive device/personal items within reach   clutter free environment maintained   increased rounding and observation   increase visualization of patient   lighting adjusted   nonskid shoes/slippers when out of bed   patient and family education   room near nurse's station   room organization consistent   safety round/check completed     Problem: Dysrhythmia  Goal: Normalized Cardiac Rhythm  Outcome: Progressing  Intervention: Monitor and Manage Cardiac Rhythm Effect  Recent Flowsheet Documentation  Taken 12/30/2024 2053 by Paige Grady RN  VTE Prevention/Management: SCDs off (sequential compression devices)

## 2024-12-31 NOTE — DISCHARGE SUMMARY
[address] [address]  p: [phone]  Discharge Summary: Cardiology Service    Neelima Zuniga MRN# [id number]   YOB: 1945 Age: 79 year old     Admission Date: 12/30/2024  Discharge Date: 12/31/24    Discharge Diagnoses:  # Chronic Diastolic Heart Failure  # Hypertrophic cardiomyopathy  # A flutter s/p ablation 12/30/2024  # SVT  # Chronic anticoagulation  # Hyperlipidemia  #T2DM  # Ileus  # Anxiety  # Hx of DCIS and LCIS R breast, in remission     Brief HPI:  Neelima Zuniga is a 79 year old female with PMH of SVT and A flutter on apixaban, hypertrophic cardiomyopathy, HTN, BEATRIZ on CPAP, and hx of breast cancer who was admitted with hypoxemia post Aflutter ablation.     Hospital Course by Diagnosis:  # Chronic Diastolic Heart Failure  # Hypertrophic cardiomyopathy  Long standing history of hypertrophic CM without heart failure. Reports SOB on exertion ~ 1 year and orthopnea. No chest pain. Last echo 9/2024 showed LVEF 55-60% with severe LV hypertrophy and high RA pressure. Was not on diuretics. Presented after the Aflut ablation with hypoxemia. On exam, high JVP and crackles both lungs, leg 2+ edema. CXR is consistent with pulmonary edema. S/p lasix with good response and patient asymptomatic, sats in mid 90s on RA at discharge.  - Unclear EDW, weight at discharge 158lbs  - Continue PTA Losartan 100 mg   - Continue PTA metoprolol succinate 12.5 mg daily     # A flutter s/p ablation 12/30/2024  # SVT  # Chronic anticoagulation  Patient with newly diagnosed AFL in 6/2024. She had CHAITANYA/DCCV on 7/17/24, 9/5/2024 but with recurrence. Attempted CT ablation in 9/2024 but aborted due to patient discomfort and apnea. S/p successful CTI ablation with achievement of bidirectional block 12/30/2024.  CHADSVASC 4 on apixaban. Currently in sinus.  - PTA apixaban 5 mg BID  - PTA digoxin 250 mcg daily  - Beta blocker as above     #  Hyperlipidemia  LDL (11/2024) = 89,   - PTA rosuvastatin 10 mg daily     #T2DM  A1C 7.3% (11/2024). Not on medication.  - Follow up with PCP      # Ileus  Patient has no complains and reports chronic abdominal distention. Abdominal XR showed possible large bowel ileus.  - Resume home bowel regimen and follow up with PCP      # Anxiety   - PTA Venlafaxine 37.5 mg daily     # Hx of DCIS and LCIS R breast, in remission  S/p R mastectomy 2017 without chemotherapy. Could not tolerate raloxifene.     Pertinent Procedures:  1. AF Ablation    Consults:  EP     Medication Changes:  None    Discharge medications:   Current Discharge Medication List        CONTINUE these medications which have CHANGED    Details   metoprolol succinate ER (TOPROL XL) 25 MG 24 hr tablet Take 0.5 tablets (12.5 mg) by mouth every evening.           CONTINUE these medications which have NOT CHANGED    Details   apixaban ANTICOAGULANT (ELIQUIS ANTICOAGULANT) 5 MG tablet Take 1 tablet (5 mg) by mouth 2 times daily  Qty: 180 tablet, Refills: 3    Associated Diagnoses: Left ventricular outflow tract obstruction; HOCM (hypertrophic obstructive cardiomyopathy) (H); SVT (supraventricular tachycardia) (H); Atrial flutter, unspecified type (H)      calcium-vitamin D (CALTRATE) 600-400 MG-UNIT per tablet Take 1 tablet by mouth 2 times daily      digoxin (LANOXIN) 250 MCG tablet Take 1 tablet (250 mcg) by mouth daily.  Qty: 90 tablet, Refills: 2    Associated Diagnoses: SVT (supraventricular tachycardia) (H)      eszopiclone (LUNESTA) 2 MG tablet Take 2 mg by mouth nightly as needed for sleep      Glucosamine Sulfate 1500 MG PACK Take 1,500 mg by mouth daily      Lactobacillus Rhamnosus, GG, ( PROBIOTIC DIGESTIVE CARE) CAPS Take 1 capsule by mouth daily      losartan (COZAAR) 100 MG tablet Take 100 mg by mouth every evening.      MAGNESIUM OXIDE PO Take 400 mg by mouth daily      MULTIPLE VITAMIN PO daily      omeprazole (PRILOSEC) 40 MG DR capsule  Take 40 mg by mouth every morning      polyethylene glycol (MIRALAX/GLYCOLAX) packet Take 1 packet by mouth daily      ROSUVASTATIN CALCIUM PO Take 10 mg by mouth every evening.      saline 0.65 % SOLN Spray 1 spray in nostril      venlafaxine (EFFEXOR XR) 37.5 MG 24 hr capsule Take 1 capsule (37.5 mg) by mouth daily  Qty: 90 capsule, Refills: 3    Associated Diagnoses: Menopausal syndrome (hot flashes)             Follow-up:  EP in 1-2 months  General cardiology 1/9 as scheduled    Labs or imaging requiring follow-up after discharge:  None    Code status:  Full Code     Condition on discharge  Temp:  [97.9  F (36.6  C)-100  F (37.8  C)] 98.5  F (36.9  C)  Pulse:  [] 86  Resp:  [17-26] 20  BP: (103-133)/() 121/82  SpO2:  [84 %-98 %] 93 %    General: Alert, interactive, NAD  Eyes: sclera anicteric, EOMI  Cardiovascular: regular rate and rhythm, normal S1 and S2, no murmurs, gallops, or rubs  Resp: clear to auscultation bilaterally, no rales, wheezes, or rhonchi  GI: Soft, nontender, nondistended. +BS.  No HSM or masses, no rebound or guarding.  Extremities: trace edema, no cyanosis or clubbing, dorsalis pedis and posterior tibialis pulses 2+ bilaterally  Skin: Warm and dry, no jaundice or rash  Neuro: Moves all extremities equally  Psych: Alert & oriented x 3    Imaging with results:  EP Ablation 12/30/24:   Procedures:  1. Redo Cavotricuspid isthmus ablation.  2. 3D Mapping using Carto3.  3. Invasive hemodynamic monitoring     Attending: Dr. Bowser   EP Fellow: Efrain Loyd  Procedure Date: 12/30/2024     Pre-operative Diagnosis:  Isthmus dependant Counter Clockwise Atrial Flutter.  Post-operative diagnosis:  leak at the IVC aspect of the prior CTI line, s/p successful redo cavotricuspid isthmus ablation with bidirectional block  Complications:  None.  Fluoroscopy time/dose: 4.1 minutes, 22.17 cGycm2.     Clinical Profile:  Ms. Zuniga is an 79 year old female who has a medical history significant for  SVT , AFL (CHADSVASC 4 on Eliquis), HOCM, HTN, breast CA, and BEATRIZ (uses CPAP). She has newly diagnosed AFL in 6/2024. She had CHAITANYA/DCCV on 7/17/24. Doing well after DCCV noted some symptom improvement in SOB/NDIAYE. She had recurrent AFL and had another DCCV on 9/5/24. She underwent a CTI ablation on 9/18/2024 but was aborted without achieving bidirectional block due to patient discomfort and apnea under moderate sedation. Plan to bring back patient to complete the CTI line under GA today.      PROCEDURE  The risks and benefits of the procedure were explained to the patient in full.  The risks include, but are not limited to: pain, bleeding, blood transfusion reactions, arrhythmia, dissection of vessels, cardiac perforation, pericardial effusion, AV block with need of a pacemaker, stroke, and death. Informed Consent was obtained. The patient was brought to the EP lab in a fasting and hemodynamically stable condition. The patient was prepped and draped in a sterile fashion.      Sedation: This procedure was performed under GA. Refer to anesthesia log     Sheaths and Catheters:  After local anesthesia with 2% lidocaine, vascular access was obtained using the modified Seldinger technique for the following access points:     Right Femoral Vein:  - 7Fr Locking Sheath: A decapolar Catheter placed into the Coronary Sinus.  - 8Fr sheath: through which a mapping pentarray and an irrigated [employer]ges Thermocool Smartouch 3.5 mm DF curve mapping and ablation catheter was positioned into the RA and RV.      Right Femoral Artery:  - 4Fr sheath for hemodynamic monitoring     EP study results (in milliseconds):  Pre-ablation: In Sinus rhythm, AA= 656, VV= 656, AL= 167, QRS= 109, QT= 389.  Post-ablation: AA=VV= 1052, AL= 160, QRS= 88, QT= 400.        Mapping and Ablation:  The patient was noted to be in sinus rhythm at the beginning of the procedure. We started with voltage and activation mapping of the previous CTI line with  pacing  from CS proximal. The line initially appeared blocked with a TICT of 142 msec at the tricuspid aspect but was 118 msec at the IVC aspect. Activation mapping did demonstrate block across the line but with a suspected area of reconnection at the IVC side. We decided to reinforce the CTI line with redo ablation. The CTI line was noticed to be short with a prominent eustachian ridge.      The ablation catheter was advanced into the cavotriscupid isthmus to a position where there was a large ventricular signal, and a small atrial signal. The position of the catheter was checked in the both the RONQUILLO and English projections.  Prior to ablating, the catheter was maneuvered to map out the HIS location. Then, we repositioned the catheter in the 6 O clock to 6:30 position in the English projection on the 3D map.  A series of coalescing ablation lesions were applied that connected the tricuspid annulus to the IVC. With ablation at the IVC side and covering both sides of the eustachian ridge, the TICT prolonged to 163 msec with clear double potential signal. Further lesions were delivered at that aspect given a short and thick CTI line. Bidirectional block was confirmed with activation mapping using the pentarray and with pacing from CSp.  Figure of 8 suture and manual pressure were applied after the sheaths removal. Hemostasis was achieved.      ASSESSMENT / PLAN:  - Successful redo ablation of the CTI line with achievement of bidirectional block  1.  Continue apixaban  2.  Two hours of bedrest followed by suture removal and discharge home today    Other imaging studies:      Patient Care Team:  Barber Valladares MD as PCP - General (Family Medicine)  Mikey Thomas MD as Referring Physician (Ophthalmology)  Ishmael Gerardo MD as MD (Family Practice)  Charmaine Wilson MD as Assigned Cancer Care Provider  Francia Myers RN as Specialty Care Coordinator (Hematology & Oncology)  Guy Kinsey,  MD as Assigned Heart and Vascular Provider    Teri Nice MS, PA-C  [facility]  Patient discussed with staff cardiologist, Dr. Hahn, who agrees with the above documentation and plan. Documentation represents joint decision making.     Time Spent on this Encounter   I, Teri Nice PA-C, personally saw the patient today and spent greater than 30 minutes discharging this patient.      ATTENDING ATTESTATION:  Patient has been seen and evaluated by me on December 31, 2024. I agree with the discharge summary note above. I have reviewed pertinent labs and studies. More than 30 minutes was spent organizing this patient's hospital discharge.     Vita Hahn MD, MS

## 2024-12-31 NOTE — PROGRESS NOTES
Admission Note 12/30/24 @2053  ------------------------------------------------------    Pt status: R groin site CDI, no drainage, bruising, or hematoma. VSS. Pt on NC 1.5 L to keep O2 sats > 90%. Pt aware of plan for observation overnight.  Admitted from: Unit 2A  Via: Bed  Accompanied by: Ruba TIAN RN  Belongings: Placed at pt bedside; declined sending any items to security.  Admission Profile: Complete  2 RN skin assessment: Paige Grady RN and Aundrea Isaacs RN  Teaching: orientation to unit, call don't fall, use of console, when to call for the RN (angina/sob/dizziness, etc.), and enforced importance of safety   Access: 2 L PIVs  Telemetry: Placed on patient  Height/Weight: Complete     Temp:  [97.9  F (36.6  C)-100  F (37.8  C)] 100  F (37.8  C)  Pulse:  [] 98  Resp:  [16-26] 20  BP: (103-143)/() 130/57  SpO2:  [84 %-98 %] 95 %

## 2025-01-02 NOTE — UTILIZATION REVIEW
Admission Status; Secondary Review Determination    No action to be taken. Please contact me on my Email : [email or web address] if you have any questions.    As part of the [place] Utilization review plan, a self-audit is done on Medicare inpatient admission with less than 2 midnights stay. The 2014 IPPS Final Rule allows outpatient billing in the event that a hospital determines that an inpatient admission was not medically necessary under utilization review process.     (x) Outpatient status would be Appropriate- Short Stay- Post discharge review.    RATIONALE FOR DETERMINATION  Neelima Zuniga is a 79 year old female with PMH of SVT and A flutter on apixaban, hypertrophic cardiomyopathy, HTN, BEATRIZ on CPAP, and hx of breast cancer who was admitted post:    1. Redo Cavotricuspid isthmus ablation.    2. 3D Mapping using Carto3.    3. Invasive hemodynamic monitoring     Patient had a brief episode of post procedure hypoxia, but was weaned fairly quickly off the Oxygen       Patient was admitted and discharge after one night stay. Record was sent by  for a PA review. Based on the  severity of illness, intensity of service provided, expected LOS and risk for adverse outcome make the care appropriate for further outpatient/observation; however, doesn't meet criteria for hospital inpatient admission.       The information on this document is developed by the utilization review team in order for the business office to ensure compliance.  This only denotes the appropriateness of proper admission status and does not reflect the quality of care rendered.       The definitions of Inpatient Status and Observation Status used in making the determination above are those provided in the CMS Coverage Manual, Chapter 1 and Chapter 6, section 70.4.     Please cont me if you want to discuss further about this admission episode.      Jennifer Mcclellan MD, FACP, DIRK  Medical Director - Utilization management  Staff  Hospitalist  [facility]    Pager: [phone]

## 2025-02-09 ENCOUNTER — HEALTH MAINTENANCE LETTER (OUTPATIENT)
Age: 80
End: 2025-02-09

## 2025-02-27 ENCOUNTER — TELEPHONE (OUTPATIENT)
Dept: CARDIOLOGY | Facility: CLINIC | Age: 80
End: 2025-02-27
Payer: MEDICARE

## 2025-02-27 NOTE — TELEPHONE ENCOUNTER
Attempted to reach patient to schedule echo prior to 4/14 appt with Dacia Ansari per nurse's request.    No answer, left voicemail.

## 2025-03-07 ENCOUNTER — LAB (OUTPATIENT)
Dept: LAB | Facility: CLINIC | Age: 80
End: 2025-03-07
Payer: MEDICARE

## 2025-03-07 DIAGNOSIS — I42.1 HOCM (HYPERTROPHIC OBSTRUCTIVE CARDIOMYOPATHY) (H): ICD-10-CM

## 2025-03-07 DIAGNOSIS — I48.3 TYPICAL ATRIAL FLUTTER (H): ICD-10-CM

## 2025-03-07 DIAGNOSIS — I47.10 SVT (SUPRAVENTRICULAR TACHYCARDIA): ICD-10-CM

## 2025-03-07 LAB — DIGOXIN SERPL-MCNC: 1.1 NG/ML (ref 0.6–1.2)

## 2025-03-07 PROCEDURE — 99000 SPECIMEN HANDLING OFFICE-LAB: CPT | Performed by: PATHOLOGY

## 2025-03-07 PROCEDURE — 80162 ASSAY OF DIGOXIN TOTAL: CPT | Performed by: NURSE PRACTITIONER

## 2025-03-07 PROCEDURE — 36415 COLL VENOUS BLD VENIPUNCTURE: CPT | Performed by: PATHOLOGY

## 2025-03-10 ENCOUNTER — TELEPHONE (OUTPATIENT)
Dept: CARDIOLOGY | Facility: CLINIC | Age: 80
End: 2025-03-10

## 2025-03-10 ENCOUNTER — ANCILLARY PROCEDURE (OUTPATIENT)
Dept: CARDIOLOGY | Facility: CLINIC | Age: 80
End: 2025-03-10
Payer: MEDICARE

## 2025-03-10 DIAGNOSIS — I48.3 TYPICAL ATRIAL FLUTTER (H): ICD-10-CM

## 2025-03-10 LAB — LVEF ECHO: NORMAL

## 2025-03-10 PROCEDURE — 93306 TTE W/DOPPLER COMPLETE: CPT | Performed by: INTERNAL MEDICINE

## 2025-03-10 NOTE — TELEPHONE ENCOUNTER
Spoke with patient to offer her an appt with Dr. Kinsey tomorrow, 3/11 at 9:30am with an echo prior.    Neelima said that she can come in for an echo today, and is happy to take the 9:30 Appt tomorrow with Dr. Kinsey.

## 2025-03-11 ENCOUNTER — OFFICE VISIT (OUTPATIENT)
Dept: CARDIOLOGY | Facility: CLINIC | Age: 80
End: 2025-03-11
Attending: INTERNAL MEDICINE
Payer: MEDICARE

## 2025-03-11 VITALS
DIASTOLIC BLOOD PRESSURE: 65 MMHG | WEIGHT: 164 LBS | HEART RATE: 92 BPM | OXYGEN SATURATION: 94 % | BODY MASS INDEX: 28.15 KG/M2 | SYSTOLIC BLOOD PRESSURE: 132 MMHG

## 2025-03-11 DIAGNOSIS — I42.1 HOCM (HYPERTROPHIC OBSTRUCTIVE CARDIOMYOPATHY) (H): ICD-10-CM

## 2025-03-11 DIAGNOSIS — R06.09 DOE (DYSPNEA ON EXERTION): ICD-10-CM

## 2025-03-11 DIAGNOSIS — I48.3 TYPICAL ATRIAL FLUTTER (H): Primary | ICD-10-CM

## 2025-03-11 PROCEDURE — 3078F DIAST BP <80 MM HG: CPT | Performed by: INTERNAL MEDICINE

## 2025-03-11 PROCEDURE — 1126F AMNT PAIN NOTED NONE PRSNT: CPT | Performed by: INTERNAL MEDICINE

## 2025-03-11 PROCEDURE — 99215 OFFICE O/P EST HI 40 MIN: CPT | Mod: GC | Performed by: INTERNAL MEDICINE

## 2025-03-11 PROCEDURE — 3075F SYST BP GE 130 - 139MM HG: CPT | Performed by: INTERNAL MEDICINE

## 2025-03-11 PROCEDURE — G0463 HOSPITAL OUTPT CLINIC VISIT: HCPCS | Performed by: INTERNAL MEDICINE

## 2025-03-11 ASSESSMENT — PAIN SCALES - GENERAL: PAINLEVEL_OUTOF10: NO PAIN (0)

## 2025-03-11 NOTE — PROGRESS NOTES
HPI:   Initial Visit 06/11/2024  Neelima Zuniga is a 79 year old female with history of SVT (previously on metoprolol, flecainide, and digoxin), htn, and HCM    Initially referred for LVOT obstruction, found on echo after patient was initiated on hydrochlorothiazide.     She goes to the [facility] three times a week, sometimes twice a day. She participates in silver sneaker classes and is able to do these without any symptoms chest pain, dyspnea at rest or with exertion, PND, orthopnea, peripheral edema, palpitations, lightheadedness or syncope.     However she states that when she walks slowly on a flat surface she will experience shortness of breath, this has been going on for a while and there has been no change in the severity of the symptoms. She first noticed this in December 2023 when she was on a walking tour of [facility] and had to walk quickly to keep up with the . She is able to perform ADLs including grocery shopping. She states she does not experience shortness of breath while she is grocery shopping and she is unsure if this is because she leans on the cart for stability.    Interval History 09/03/2024  Neelima is doing okay today. She underwent electrical cardioversion in early August and reports improvement, but not resolution, of NDIAYE at that time. However, her symptoms worsened about 2-3 weeks ago, when she suspects that she went back into atrial flutter. Otherwise, her symptoms are at baseline. No chest pain. She has intermittent palpitations which are consistent with prior supraventricular arrhythmias. No lightheadedness or syncope. No orthopnea, PND, or SEBASTIAN.     Interval History 3/11/25:  Today, patient presents alone.. Underwent successful cardioversion on 9/5/24 for atrial flutter. She then underwent a successful atrial flutter ablation (CTI) on 12/30/24 with Dr. Bowser. She was admitted following this for hypoxemia, discharged the following day. Digoxin was discontinued and BB  increased on follow up EP visit on 3/7/25. TTE 3/10/25 with septal thickness of 1.8 cm, peak resting gradient 82 mmHg. She feels that her is SOB is still not improved. She feels that ambulation of short periods se still have SOB. She is worried that her April trip to [place] will be missed due to her ongoing NDIAYE. Denies exertional chest pain or pressure. Possible single episode of palpitations since her ablation. Denies syncope. She has not increased her BB yet and only takes her 12.5 mg at night.     PAST MEDICAL HISTORY:  Past Medical History:   Diagnosis Date    Allergic rhinitis     Amblyopia     left eye    Anxiety     Aortic valve sclerosis     Basal cell carcinoma (BCC) of eye 2007    Infraorbital left    Breast cancer (H) 2007    Cancer (H) 2009    right breast    Diabetes mellitus, type 2 (H)     Dysphagia     Gastric mass     Heart murmur     HTN (hypertension)     Hyperlipidemia     Left ventricular hypertrophy     Left ventricular outflow tract obstruction     Osteopenia     Palpitations     Reflux esophagitis     Sleep apnea     CPAP    SVT (supraventricular tachycardia)     Uterine fibroid        CURRENT MEDICATIONS:  Current Outpatient Medications   Medication Sig Dispense Refill    apixaban ANTICOAGULANT (ELIQUIS ANTICOAGULANT) 5 MG tablet Take 1 tablet (5 mg) by mouth 2 times daily 180 tablet 3    calcium-vitamin D (CALTRATE) 600-400 MG-UNIT per tablet Take 1 tablet by mouth 2 times daily      Cholecalciferol (D 1000) 25 MCG (1000 UT) CAPS Take 1 tablet by mouth daily.      eszopiclone (LUNESTA) 2 MG tablet Take 2 mg by mouth nightly as needed for sleep      Glucosamine Sulfate 1500 MG PACK Take 1,500 mg by mouth daily      Lactobacillus Rhamnosus, GG, ( PROBIOTIC DIGESTIVE CARE) CAPS Take 1 capsule by mouth daily      losartan (COZAAR) 100 MG tablet Take 100 mg by mouth every evening.      magnesium oxide 400 MG CAPS Take 1 capsule by mouth daily.      metoprolol succinate ER (TOPROL XL) 25 MG 24  "hr tablet Take 1 tablet (25 mg) by mouth daily. 90 tablet 3    multivitamin, therapeutic (THERA-VIT) TABS tablet Take 1 tablet by mouth daily.      omeprazole (PRILOSEC) 40 MG DR capsule Take 40 mg by mouth every morning      polyethylene glycol (MIRALAX/GLYCOLAX) packet Take 1 packet by mouth daily      rosuvastatin (CRESTOR) 10 MG tablet Take 10 mg by mouth daily.      saline 0.65 % SOLN Spray 1 spray in nostril as needed.      venlafaxine (EFFEXOR XR) 37.5 MG 24 hr capsule Take 1 capsule (37.5 mg) by mouth daily 90 capsule 3       ALLERGIES:     Allergies   Allergen Reactions    Erythromycin     Seasonal Allergies      Other Reaction(s): Unknown    Sulfamethoxazole-Trimethoprim Hives    Sulfa Antibiotics Rash       FAMILY HISTORY:  Family History   Problem Relation Age of Onset    Macular Degeneration Father     Glaucoma Father     Glaucoma Paternal Grandmother     Diabetes Mother     Hypertension Mother     Hypertension Father     Hyperlipidemia Father      No family history of sudden death.    Mom: CHF  Paternal GF:  from \"heart issues\"   Sister: Afib     SOCIAL HISTORY:  Social History     Tobacco Use    Smoking status: Never    Smokeless tobacco: Never   Vaping Use    Vaping status: Never Used   Substance Use Topics    Alcohol use: Not Currently    Drug use: No       ROS:   A comprehensive 14 point review of systems is negative other than as mentioned in HPI.    Exam:  /65 (BP Location: Left arm, Patient Position: Chair, Cuff Size: Adult Regular)   Pulse 92   Wt 74.4 kg (164 lb)   SpO2 94%   BMI 28.15 kg/m    GENERAL APPEARANCE: healthy, alert and no distress  EYES: no icterus, no xanthelasmas  ENT: normal palate, mucosa moist, no central cyanosis  NECK: JVP not elevated  RESPIRATORY: lungs clear to auscultation - no rales, rhonchi or wheezes, no use of accessory muscles, no retractions, respirations are unlabored, normal respiratory rate  CARDIOVASCULAR: regular rhythm, normal S1 with " physiologic split S2, no S3 or S4, IV/VI systolic murmur at sternal border.   EXTREMITIES: no edema  NEURO: alert and oriented to person/place/time, normal speech  SKIN: no ecchymoses, no rashes.  PSYCH: cooperative, affect appropriate.     Labs:  Reviewed.     Testing/Procedures:    I personally visualized and interpreted:  TTE 3/10/25:  Normal biventricular function, septal thickness of 1.8 cm, peak resting gradient 82 mmHg.     09/03/24 TTE  Known HCM with asymmetric hypertrophy. CANDIDA with septal contact resulting in LVOTO. LVOT gradient at rest 68 mmHg, Valsalva 82 mmHg. RAP ~15 mmHg. Overall, similar to prior echo 01/19/24.     04/2024 Zio   3 runs of nonsustained VT, 1 of which is associated with symptoms.   Runs of nonsustained SVT.  Symptomatic episodes corresponded to sinus rhythm with PACs, PVCs, or a run of nonsustained VT.    3/20/24 CMR  1.  Normal left ventricular size.  Severe asymmetric left ventricular hypertrophy.  The maximal thickness  of basal anteroseptal wall is 1.7 cm.  The thickness of posterolateral wall is 1.2 cm. There is obvious  accelerated LVOT flow velocity indicating some obstruction.  However, there is no anterior septal motion of  mitral valve. There is no rest perfusion defect.  The calculated left ventricular ejection fraction is 62%.  2.  No prior myocardial infarction, myocardial scar or other infiltrative process.  T1 mapping value is  mildly elevated to 1016 ms (normal reference less than 1000 ms).  3.  Normal right ventricular size and systolic function.  4.  Mild to moderate mitral and tricuspid valve regurgitation.    1/19/24 TTE  Left ventricular size, wall motion and function are normal. The ejection  fraction is 60-65%.  Normal right ventricle size and systolic function.  The left atrium is mildly dilated.  Mild to moderate valvular aortic stenosis.  IVC diameter and respiratory changes fall into an intermediate range  suggesting an RA pressure of 8 mmHg.    1/12/23 TTE    Left ventricular size, wall motion and function are normal. The ejection  fraction is 60-65%.  Normal right ventricle size and systolic function.  The left atrium is mildly dilated.  Mild to moderate valvular aortic stenosis.  IVC diameter and respiratory changes fall into an intermediate range  suggesting an RA pressure of 8 mmHg.    1/25/22 Stress Echo    Interpretation Summary  1. Normal stress echocardiogram without evidence of stress induced ischemia.  2. Normal resting LV systolic performance with an ejection fraction of 55-60%.  There is normal improvement in left ventricular systolic performance with a  peak ejection fraction of 70-75%.  3. No ECG evidence of ischemia.  4. No anginal chest pain reported with exercise.  5. Average functional capacity for age.  6. Two PVCs and a solitary couplet were noted. No other rhythm disturbances  detected.    Assessment and Plan:   Ms Neelima Zuniga is a 79 year old lady with a history of HCM with obstruction who presents to clinic today for follow up.     1.HCM with Obstruction  2. Aflutter [id number]=1 age (2), F, HTN)  HCM with obstruction, LVOT gradient ~63 mmHg with NYHA class II-III exertional dyspnea. History of non-sustained VT on ambulatory monitoring, no history of sustained arrhythmias.   At our first visit on 06/11/24, we discussed HCM and LVOTO as well as the various options for therapy. She was maintained on medical therapy with beta blockade, metoprolol succinate 12.5 mg daily, and wanted time to think about additional treatment modalities.   Since her last visit, she has undergone CHAITANYA with cardioversion. Unfortunately, she reverted back to atrial flutter, likely 2-3 weeks ago. Her symptoms today are unchanged from her prior visit now that she is back in atrial flutter.    S/p AFL ablation. Patient has not had significant improvement in symptoms. Will increase BB as tolerated. We discussed second line therapies such as mavacamten. After discussing  these, the patient is interested in pursuing mavacamten. Will initiate this process and repeat an echo 4 weeks following initiation to monitor her LVEF.     Plan  - Increase metoprolol to 25 mg daily, schedule in the morning. Potential increase in the future if tolerated  - Start mavacamten, TTE in 4 weeks  - Continue losartan 100 mg daily for HTN  - Continue Apixaban  - EP follow up  - Follow up 3 months with TTE    This patient was seen and discussed with Dr Guy Kinsey, attending cardiologist, who agrees with the assessment and plan unless otherwise indicated.    Gomez Chau, PGY-6  Cardiovascular Disease Fellow

## 2025-03-11 NOTE — LETTER
3/11/2025      RE: Neelima Zuniga  [address] [place] [address]       Dear Colleague,    Thank you for the opportunity to participate in the care of your patient, Neelima Zuniga, at the [facility] [place] at [facility]. Please see a copy of my visit note below.      HPI:   Initial Visit 06/11/2024  Neelima Zuniga is a 79 year old female with history of SVT (previously on metoprolol, flecainide, and digoxin), htn, and HCM    Initially referred for LVOT obstruction, found on echo after patient was initiated on hydrochlorothiazide.     She goes to the [facility] three times a week, sometimes twice a day. She participates in silver sneaker classes and is able to do these without any symptoms chest pain, dyspnea at rest or with exertion, PND, orthopnea, peripheral edema, palpitations, lightheadedness or syncope.     However she states that when she walks slowly on a flat surface she will experience shortness of breath, this has been going on for a while and there has been no change in the severity of the symptoms. She first noticed this in December 2023 when she was on a walking tour of [facility] and had to walk quickly to keep up with the . She is able to perform ADLs including grocery shopping. She states she does not experience shortness of breath while she is grocery shopping and she is unsure if this is because she leans on the cart for stability.    Interval History 09/03/2024  Neelima is doing okay today. She underwent electrical cardioversion in early August and reports improvement, but not resolution, of NDIAYE at that time. However, her symptoms worsened about 2-3 weeks ago, when she suspects that she went back into atrial flutter. Otherwise, her symptoms are at baseline. No chest pain. She has intermittent palpitations which are consistent with prior supraventricular arrhythmias. No lightheadedness or syncope. No  orthopnea, PND, or SEBASTIAN.     Interval History 3/11/25:  Today, patient presents alone.. Underwent successful cardioversion on 9/5/24 for atrial flutter. She then underwent a successful atrial flutter ablation (CTI) on 12/30/24 with Dr. Bowser. She was admitted following this for hypoxemia, discharged the following day. Digoxin was discontinued and BB increased on follow up EP visit on 3/7/25. TTE 3/10/25 with septal thickness of 1.8 cm, peak resting gradient 82 mmHg. She feels that her is SOB is still not improved. She feels that ambulation of short periods se still have SOB. She is worried that her April trip to [place] will be missed due to her ongoing NDIAYE. Denies exertional chest pain or pressure. Possible single episode of palpitations since her ablation. Denies syncope. She has not increased her BB yet and only takes her 12.5 mg at night.     PAST MEDICAL HISTORY:  Past Medical History:   Diagnosis Date     Allergic rhinitis      Amblyopia     left eye     Anxiety      Aortic valve sclerosis      Basal cell carcinoma (BCC) of eye 2007    Infraorbital left     Breast cancer (H) 2007     Cancer (H) 2009    right breast     Diabetes mellitus, type 2 (H)      Dysphagia      Gastric mass      Heart murmur      HTN (hypertension)      Hyperlipidemia      Left ventricular hypertrophy      Left ventricular outflow tract obstruction      Osteopenia      Palpitations      Reflux esophagitis      Sleep apnea     CPAP     SVT (supraventricular tachycardia)      Uterine fibroid        CURRENT MEDICATIONS:  Current Outpatient Medications   Medication Sig Dispense Refill     apixaban ANTICOAGULANT (ELIQUIS ANTICOAGULANT) 5 MG tablet Take 1 tablet (5 mg) by mouth 2 times daily 180 tablet 3     calcium-vitamin D (CALTRATE) 600-400 MG-UNIT per tablet Take 1 tablet by mouth 2 times daily       Cholecalciferol (D 1000) 25 MCG (1000 UT) CAPS Take 1 tablet by mouth daily.       eszopiclone (LUNESTA) 2 MG tablet Take 2 mg by mouth  "nightly as needed for sleep       Glucosamine Sulfate 1500 MG PACK Take 1,500 mg by mouth daily       Lactobacillus Rhamnosus, GG, ( PROBIOTIC DIGESTIVE CARE) CAPS Take 1 capsule by mouth daily       losartan (COZAAR) 100 MG tablet Take 100 mg by mouth every evening.       magnesium oxide 400 MG CAPS Take 1 capsule by mouth daily.       metoprolol succinate ER (TOPROL XL) 25 MG 24 hr tablet Take 1 tablet (25 mg) by mouth daily. 90 tablet 3     multivitamin, therapeutic (THERA-VIT) TABS tablet Take 1 tablet by mouth daily.       omeprazole (PRILOSEC) 40 MG DR capsule Take 40 mg by mouth every morning       polyethylene glycol (MIRALAX/GLYCOLAX) packet Take 1 packet by mouth daily       rosuvastatin (CRESTOR) 10 MG tablet Take 10 mg by mouth daily.       saline 0.65 % SOLN Spray 1 spray in nostril as needed.       venlafaxine (EFFEXOR XR) 37.5 MG 24 hr capsule Take 1 capsule (37.5 mg) by mouth daily 90 capsule 3       ALLERGIES:     Allergies   Allergen Reactions     Erythromycin      Seasonal Allergies      Other Reaction(s): Unknown     Sulfamethoxazole-Trimethoprim Hives     Sulfa Antibiotics Rash       FAMILY HISTORY:  Family History   Problem Relation Age of Onset     Macular Degeneration Father      Glaucoma Father      Glaucoma Paternal Grandmother      Diabetes Mother      Hypertension Mother      Hypertension Father      Hyperlipidemia Father      No family history of sudden death.    Mom: CHF  Paternal GF:  from \"heart issues\"   Sister: Afib     SOCIAL HISTORY:  Social History     Tobacco Use     Smoking status: Never     Smokeless tobacco: Never   Vaping Use     Vaping status: Never Used   Substance Use Topics     Alcohol use: Not Currently     Drug use: No       ROS:   A comprehensive 14 point review of systems is negative other than as mentioned in HPI.    Exam:  /65 (BP Location: Left arm, Patient Position: Chair, Cuff Size: Adult Regular)   Pulse 92   Wt 74.4 kg (164 lb)   SpO2 94%   BMI " 28.15 kg/m    GENERAL APPEARANCE: healthy, alert and no distress  EYES: no icterus, no xanthelasmas  ENT: normal palate, mucosa moist, no central cyanosis  NECK: JVP not elevated  RESPIRATORY: lungs clear to auscultation - no rales, rhonchi or wheezes, no use of accessory muscles, no retractions, respirations are unlabored, normal respiratory rate  CARDIOVASCULAR: regular rhythm, normal S1 with physiologic split S2, no S3 or S4, IV/VI systolic murmur at sternal border.   EXTREMITIES: no edema  NEURO: alert and oriented to person/place/time, normal speech  SKIN: no ecchymoses, no rashes.  PSYCH: cooperative, affect appropriate.     Labs:  Reviewed.     Testing/Procedures:    I personally visualized and interpreted:  TTE 3/10/25:  Normal biventricular function, septal thickness of 1.8 cm, peak resting gradient 82 mmHg.     09/03/24 TTE  Known HCM with asymmetric hypertrophy. CANDIDA with septal contact resulting in LVOTO. LVOT gradient at rest 68 mmHg, Valsalva 82 mmHg. RAP ~15 mmHg. Overall, similar to prior echo 01/19/24.     04/2024 Zio   3 runs of nonsustained VT, 1 of which is associated with symptoms.   Runs of nonsustained SVT.  Symptomatic episodes corresponded to sinus rhythm with PACs, PVCs, or a run of nonsustained VT.    3/20/24 CMR  1.  Normal left ventricular size.  Severe asymmetric left ventricular hypertrophy.  The maximal thickness  of basal anteroseptal wall is 1.7 cm.  The thickness of posterolateral wall is 1.2 cm. There is obvious  accelerated LVOT flow velocity indicating some obstruction.  However, there is no anterior septal motion of  mitral valve. There is no rest perfusion defect.  The calculated left ventricular ejection fraction is 62%.  2.  No prior myocardial infarction, myocardial scar or other infiltrative process.  T1 mapping value is  mildly elevated to 1016 ms (normal reference less than 1000 ms).  3.  Normal right ventricular size and systolic function.  4.  Mild to moderate mitral  and tricuspid valve regurgitation.    1/19/24 TTE  Left ventricular size, wall motion and function are normal. The ejection  fraction is 60-65%.  Normal right ventricle size and systolic function.  The left atrium is mildly dilated.  Mild to moderate valvular aortic stenosis.  IVC diameter and respiratory changes fall into an intermediate range  suggesting an RA pressure of 8 mmHg.    1/12/23 TTE   Left ventricular size, wall motion and function are normal. The ejection  fraction is 60-65%.  Normal right ventricle size and systolic function.  The left atrium is mildly dilated.  Mild to moderate valvular aortic stenosis.  IVC diameter and respiratory changes fall into an intermediate range  suggesting an RA pressure of 8 mmHg.    1/25/22 Stress Echo    Interpretation Summary  1. Normal stress echocardiogram without evidence of stress induced ischemia.  2. Normal resting LV systolic performance with an ejection fraction of 55-60%.  There is normal improvement in left ventricular systolic performance with a  peak ejection fraction of 70-75%.  3. No ECG evidence of ischemia.  4. No anginal chest pain reported with exercise.  5. Average functional capacity for age.  6. Two PVCs and a solitary couplet were noted. No other rhythm disturbances  detected.    Assessment and Plan:   Ms Neelima Zuniga is a 79 year old lady with a history of HCM with obstruction who presents to clinic today for follow up.     1.HCM with Obstruction  2. Aflutter [id number]=9 age (2), F, HTN)  HCM with obstruction, LVOT gradient ~63 mmHg with NYHA class II-III exertional dyspnea. History of non-sustained VT on ambulatory monitoring, no history of sustained arrhythmias.   At our first visit on 06/11/24, we discussed HCM and LVOTO as well as the various options for therapy. She was maintained on medical therapy with beta blockade, metoprolol succinate 12.5 mg daily, and wanted time to think about additional treatment modalities.   Since her last  visit, she has undergone CHAITANYA with cardioversion. Unfortunately, she reverted back to atrial flutter, likely 2-3 weeks ago. Her symptoms today are unchanged from her prior visit now that she is back in atrial flutter.    S/p AFL ablation. Patient has not had significant improvement in symptoms. Will increase BB as tolerated. We discussed second line therapies such as mavacamten. After discussing these, the patient is interested in pursuing mavacamten. Will initiate this process and repeat an echo 4 weeks following initiation to monitor her LVEF.     Plan  - Increase metoprolol to 25 mg daily, schedule in the morning. Potential increase in the future if tolerated  - Start mavacamten, TTE in 4 weeks  - Continue losartan 100 mg daily for HTN  - Continue Apixaban  - EP follow up  - Follow up 3 months with TTE    This patient was seen and discussed with Dr Guy Kinsey, attending cardiologist, who agrees with the assessment and plan unless otherwise indicated.    Gomez Chau, PGY-6  Cardiovascular Disease Fellow       Attestation signed by Guy Kinsey MD at 3/11/2025  4:39 PM:  Physician Attestation  I, Guy Kinsey MD, saw this patient and agree with the findings and plan of care as documented in the note.      Items personally reviewed/procedural attestation: vitals, labs, imaging and agree with the interpretation documented in the note, and EKG and agree with the interpretation documented in the note.    Guy Kinsey MD      Please do not hesitate to contact me if you have any questions/concerns.     Sincerely,     Guy Kinsey MD

## 2025-03-11 NOTE — PATIENT INSTRUCTIONS
Thank you for visiting the Adult Congenital and Cardiovascular Genetics Clinic at the [facility].    Cardiology Providers you saw during your visit:  Guy Kinsey MD    Diagnosis:  HCM    Results:  Guy Kinsey MD reviewed the results of your echocardiogram  testing today in clinic.    Recommendations for you:    We will enroll you for Mavacamten (Camzyos) today! Once approved, start at 5 mg once daily. We will update you on the status of application.   Increase Metoprolol succinate ER to 25 mg once daily. We will call to check in with you on home BP and symptoms.   Plan for [place] trip in the Fall!    Patient Education   Mavacamten Oral Capsule (MAVACAMTEN - ORAL)  For heart disease.  Brand Name(s): Camzyos  Generic Name: Mavacamten  Instructions  Swallow the medicine without crushing or chewing it.  This medicine may be taken with or without food.  Swallow with a full glass (8 oz) of water unless your doctor gives you different instructions.  This medicine will work best if you take it at about the same time every day.  Store at room temperature away from heat, light, and moisture. Do not keep in the bathroom.  Avoid grapefruit and grapefruit juice while on this medicine.  It is important that you keep taking each dose of this medicine on time even if you are feeling well.  If you forget to take a dose on time, take it as soon as you remember. If it is almost time for the next dose, do not take the missed dose. Return to your normal schedule. Do not take 2 doses at one time.  Drug interactions can change how medicines work or increase risk for side effects. Tell your health care providers about all medicines taken. Include prescription and over-the-counter medicines, vitamins, and herbal medicines. Speak with your doctor or pharmacist before starting or stopping any medicine.  Tell your doctor if symptoms do not get better or if they get worse.  This medicine may decrease the effectiveness  of some birth controls which use hormones (such as birth control pills and patches). Use an extra form of birth control, such as condoms, while on this medicine.  Keep all appointments for medical exams and tests while on this medicine.  Cautions  Tell your doctor and pharmacist if you ever had an allergic reaction to a medicine.  Some patients with weak hearts may have worsening of symptoms. If you notice difficulty breathing, weight gain, or swelling of your legs or ankles, let your doctor know right away.  Do not use the medication any more than instructed.  This medicine may cause dizziness or fainting. Do not stand or sit up quickly.  If possible, avoid using with alcohol, marijuana, or other medicines that can cause dizziness or drowsiness. These include allergy/cold products, muscle relaxers, sleep aids, and pain relievers.  Your ability to stay alert or to react quickly may be impaired by this medicine. Do not drive or operate machinery until you know how this medicine will affect you.  It is unknown if this medicine passes into breast milk. Ask your doctor before breastfeeding.  This medicine can hurt a new baby in the womb. If you become pregnant while on this medicine, tell your doctor immediately. Your doctor may switch you to a different medicine.  Women of childbearing age should have a negative pregnancy test before starting this medicine.  Women must use reliable forms of birth control while using this medicine and for 4 months after stopping to prevent pregnancy.  Do not share this medicine with anyone who has not been prescribed this medicine.  Some patients have serious side effects from this medicine. Ask your pharmacist to show you the information from the Food and Drug Administration (FDA) and discuss it with you.  Side Effects  The following is a list of some common side effects from this medicine. Please speak with your doctor about what you should do if you experience these or other side  effects.  dizziness  Call your doctor or get medical help right away if you notice any of these more serious side effects:  swelling of the legs, feet, and hands  menstruation changes (missed or fewer periods)  shortness of breath  unusual or unexplained tiredness or weakness  vaginal bleeding or spotting between periods  sudden or unexplained change in weight  A few people may have an allergic reaction to this medicine. Symptoms can include difficulty breathing, skin rash, itching, swelling, or severe dizziness. If you notice any of these symptoms, seek medical help quickly.  Please speak with your doctor, nurse, or pharmacist if you have any questions about this medicine.  IMPORTANT NOTE: This document tells you briefly how to take your medicine, but it does not tell you all there is to know about it. Your doctor or pharmacist may give you other documents about your medicine. Please talk to them if you have any questions. Always follow their advice.  There is a more complete description of this medicine available in English. Scan this code on your smartphone or tablet or use the web address below. You can also ask your pharmacist for a printout. If you have any questions, please ask your pharmacist.  The display and use of this drug information is subject to Terms of Use.  More information about MAVACAMTEN - ORAL      Copyright(c) 2024 [employer].  Selected from data included with permission and copyright by [employer]. This copyrighted material has been downloaded from a licensed data provider and is not for distribution, except as may be authorized by the applicable terms of use.  Conditions of Use: The information in this database is intended to supplement, not substitute for the expertise and judgment of healthcare professionals. The information is not intended to cover all possible uses, directions, precautions, drug interactions or adverse effects nor should it be construed to indicate that  "use of a particular drug is safe, appropriate or effective for you or anyone else. A healthcare professional should be consulted before taking any drug, changing any diet or commencing or discontinuing any course of treatment. The display and use of this drug information is subject to express Terms of Use.  Care instructions adapted under license by your healthcare professional. If you have questions about a medical condition or this instruction, always ask your healthcare professional. [employer] disclaims any warranty or liability for your use of this information.         General Cardiac Recommendations:  Continue to eat a heart healthy, low salt diet.  Continue to get 20-30 minutes of aerobic activity, 4-5 days per week.  Examples of aerobic activity include walking, running, swimming, cycling, etc.  Continue to observe good oral hygiene, with regular dental visits.      SBE prophylaxis:   Yes____  No__x__    Exercise restrictions:   Yes__X__  No____         If yes, list restrictions:  Must be allowed to rest if fatigued or SOB      FASTING CHOLESTEROL was checked in the last 5 years YES___x_  NO____ (2024)  If no, please follow up with your primary care physician. You should have a cholesterol screening every 5 years.      Follow-up:  Follow up with Dr. Kinsey or Dacia Ansari 3 months after starting camzyos with echo prior.     If you have questions or concerns please contact us at:    Mark Shelley RN, BSN     Luciana Manzo (Scheduling)  Nurse Care Coordinator     Clinic   CV Genetics      Adult Congenital and CV Genetic  [facility]  (P) [phone]     (P) [phone]  (F) [phone]     (F) [phone]        For after hours urgent needs, call [phone] and ask to speak to the \"On-Call Cardiologist.\"      For emergencies call [phone].    [facility] "   [facility]  Mail Code [id number]  [address] [place]  [address]

## 2025-03-18 ENCOUNTER — TELEPHONE (OUTPATIENT)
Dept: CARDIOLOGY | Facility: CLINIC | Age: 80
End: 2025-03-18
Payer: MEDICARE

## 2025-03-18 NOTE — TELEPHONE ENCOUNTER
Prior Authorization Approval    Medication: CAMZYOS 5 MG PO CAPS  Authorization Effective Date: 3/18/2025  Authorization Expiration Date: 12/31/2025  Approved Dose/Quantity: all doses  Reference #: [id number]   Insurance Company: [employer] [phone] Fax [phone]  Expected CoPay: $ 900  CoPay Card Available:      Financial Assistance Needed:   Which Pharmacy is filling the prescription:    Pharmacy Notified: no  Patient Notified: no

## 2025-03-20 ENCOUNTER — TELEPHONE (OUTPATIENT)
Dept: CARDIOLOGY | Facility: CLINIC | Age: 80
End: 2025-03-20
Payer: MEDICARE

## 2025-03-20 NOTE — TELEPHONE ENCOUNTER
Called and left message for Neelima to request patient reach out to My Camzyos Patient Access Specialist to help finalizing enrollment for camzyos at [phone].   Also requested call back at [phone] for update on symptoms and home BP after starting metoprolol at last clinic visit on 3/7/25.    Mark Shelley RN

## 2025-04-01 NOTE — TELEPHONE ENCOUNTER
Patient was dispensed 35 day free trial of camzyos on 3/27/25 through [facility]. Following camzyos therapy protocol next refill for camzyos will be sent upon review of patient's routine echocardiogram for monitoring due on 4/18/25, 5/16/25 and 6/13/25. Rx will be sent to [facility] on 4/18/25.    Mark Shelley RN

## 2025-04-01 NOTE — TELEPHONE ENCOUNTER
[facility] specialty pharmacy left a message stating that they need a script for Camzyos. She is looking for a call back.

## 2025-04-03 ENCOUNTER — TELEPHONE (OUTPATIENT)
Dept: CARDIOLOGY | Facility: CLINIC | Age: 80
End: 2025-04-03
Payer: MEDICARE

## 2025-04-03 NOTE — TELEPHONE ENCOUNTER
Spoke with patient to schedule echo on 4/22 and 5/20 for camzyos treatment monitoring.    Confirmed dates/times.

## 2025-04-21 ENCOUNTER — TELEPHONE (OUTPATIENT)
Dept: CARDIOLOGY | Facility: CLINIC | Age: 80
End: 2025-04-21
Payer: MEDICARE

## 2025-04-22 ENCOUNTER — HOSPITAL ENCOUNTER (OUTPATIENT)
Dept: CARDIOLOGY | Facility: CLINIC | Age: 80
Discharge: HOME | End: 2025-04-22
Attending: INTERNAL MEDICINE
Payer: MEDICARE

## 2025-04-22 ENCOUNTER — TELEPHONE (OUTPATIENT)
Dept: CARDIOLOGY | Facility: CLINIC | Age: 80
End: 2025-04-22

## 2025-04-22 DIAGNOSIS — I42.1 HOCM (HYPERTROPHIC OBSTRUCTIVE CARDIOMYOPATHY) (H): ICD-10-CM

## 2025-04-22 DIAGNOSIS — I35.0 NONRHEUMATIC AORTIC VALVE STENOSIS: ICD-10-CM

## 2025-04-22 DIAGNOSIS — I42.1 HOCM (HYPERTROPHIC OBSTRUCTIVE CARDIOMYOPATHY) (H): Primary | ICD-10-CM

## 2025-04-22 DIAGNOSIS — I97.89 LEFT VENTRICULAR OUTFLOW TRACT OBSTRUCTION AFTER PROCEDURE: ICD-10-CM

## 2025-04-22 DIAGNOSIS — I51.89 LEFT VENTRICULAR OUTFLOW TRACT OBSTRUCTION AFTER PROCEDURE: ICD-10-CM

## 2025-04-22 LAB — LVEF ECHO: NORMAL

## 2025-04-22 PROCEDURE — 93306 TTE W/DOPPLER COMPLETE: CPT

## 2025-04-22 NOTE — TELEPHONE ENCOUNTER
Date: 4/22/2025    Time of Call: 3:57 PM     Diagnosis:  HOCM     [ TORB ] Ordering provider: Dr. Kinsey  Order: Continue mavacamten (camzyos) 5 mg with routine echocardiogram in 8 weeks as scheduled on 5/20/25.      Order received by: Mark Shelley     Follow-up/additional notes: PSF submitted on REMs and Rx sent to [facility] for dispensing. Called patient to review recently completed echo done on 4/22 with instructions to continue camzyos 5 mg. Patient states she has noticed some improvements in symptoms since starting medications. Confirmed echo scheduled on 5/20 and 6/10 with visit with Dr. Kinsey. She asked if she can start taking turmeric for joint pain.

## 2025-04-23 NOTE — TELEPHONE ENCOUNTER
Date: 4/23/2025    Time of Call: 9:48 AM     Diagnosis:  HOCM     [ TORB ] Ordering provider: Dr. Kinsey  Order: Continue mavacamten (camzyos) 5 mg with routine echocardiogram at 8 and 12 weeks as scheduled.     Order received by: Mark Shelley     Follow-up/additional notes: PSF submitted on REMs and Rx sent to Pharmacy for dispensing. Called and spoke with Neelima to review echo completed on 4/22/25. Patient states she has noticed some improvement in symptoms since starting camzyos. Confirmed upcoming echo as scheduled on 5/20 and 6/10.

## 2025-04-23 NOTE — TELEPHONE ENCOUNTER
Called and left message for Neelima to let her know Dr. Kinsey states that very high doses of turmeric may be associated with arrhythmia (very mixed evidence about this); normal doses should be okay for patient to take.     Mark Shelley RN

## 2025-05-12 ENCOUNTER — LAB REQUISITION (OUTPATIENT)
Dept: LAB | Facility: CLINIC | Age: 80
End: 2025-05-12
Payer: MEDICARE

## 2025-05-12 DIAGNOSIS — E78.5 HYPERLIPIDEMIA, UNSPECIFIED: ICD-10-CM

## 2025-05-12 PROCEDURE — 80053 COMPREHEN METABOLIC PANEL: CPT | Mod: ORL | Performed by: STUDENT IN AN ORGANIZED HEALTH CARE EDUCATION/TRAINING PROGRAM

## 2025-05-12 PROCEDURE — 80061 LIPID PANEL: CPT | Mod: ORL | Performed by: STUDENT IN AN ORGANIZED HEALTH CARE EDUCATION/TRAINING PROGRAM

## 2025-05-13 LAB
ALBUMIN SERPL BCG-MCNC: 4.4 G/DL (ref 3.5–5.2)
ALP SERPL-CCNC: 89 U/L (ref 40–150)
ALT SERPL W P-5'-P-CCNC: 20 U/L (ref 0–50)
ANION GAP SERPL CALCULATED.3IONS-SCNC: 10 MMOL/L (ref 7–15)
AST SERPL W P-5'-P-CCNC: 19 U/L (ref 0–45)
BILIRUB SERPL-MCNC: 0.4 MG/DL
BUN SERPL-MCNC: 20.5 MG/DL (ref 8–23)
CALCIUM SERPL-MCNC: 9.1 MG/DL (ref 8.8–10.4)
CHLORIDE SERPL-SCNC: 102 MMOL/L (ref 98–107)
CHOLEST SERPL-MCNC: 186 MG/DL
CREAT SERPL-MCNC: 0.76 MG/DL (ref 0.51–0.95)
EGFRCR SERPLBLD CKD-EPI 2021: 79 ML/MIN/1.73M2
FASTING STATUS PATIENT QL REPORTED: YES
FASTING STATUS PATIENT QL REPORTED: YES
GLUCOSE SERPL-MCNC: 129 MG/DL (ref 70–99)
HCO3 SERPL-SCNC: 29 MMOL/L (ref 22–29)
HDLC SERPL-MCNC: 46 MG/DL
LDLC SERPL CALC-MCNC: 110 MG/DL
NONHDLC SERPL-MCNC: 140 MG/DL
POTASSIUM SERPL-SCNC: 4.2 MMOL/L (ref 3.4–5.3)
PROT SERPL-MCNC: 6.4 G/DL (ref 6.4–8.3)
SODIUM SERPL-SCNC: 141 MMOL/L (ref 135–145)
TRIGL SERPL-MCNC: 148 MG/DL

## 2025-05-17 ENCOUNTER — HEALTH MAINTENANCE LETTER (OUTPATIENT)
Age: 80
End: 2025-05-17

## 2025-05-19 DIAGNOSIS — I48.92 ATRIAL FLUTTER, UNSPECIFIED TYPE (H): ICD-10-CM

## 2025-05-19 DIAGNOSIS — I42.1 HOCM (HYPERTROPHIC OBSTRUCTIVE CARDIOMYOPATHY) (H): ICD-10-CM

## 2025-05-19 DIAGNOSIS — Q24.8 LEFT VENTRICULAR OUTFLOW TRACT OBSTRUCTION: ICD-10-CM

## 2025-05-19 DIAGNOSIS — I47.10 SVT (SUPRAVENTRICULAR TACHYCARDIA): ICD-10-CM

## 2025-05-20 ENCOUNTER — HOSPITAL ENCOUNTER (OUTPATIENT)
Dept: CARDIOLOGY | Facility: CLINIC | Age: 80
Discharge: HOME | End: 2025-05-20
Attending: INTERNAL MEDICINE
Payer: MEDICARE

## 2025-05-20 DIAGNOSIS — I51.89 LEFT VENTRICULAR OUTFLOW TRACT OBSTRUCTION AFTER PROCEDURE: ICD-10-CM

## 2025-05-20 DIAGNOSIS — I35.0 NONRHEUMATIC AORTIC VALVE STENOSIS: ICD-10-CM

## 2025-05-20 DIAGNOSIS — I42.1 HOCM (HYPERTROPHIC OBSTRUCTIVE CARDIOMYOPATHY) (H): ICD-10-CM

## 2025-05-20 DIAGNOSIS — I97.89 LEFT VENTRICULAR OUTFLOW TRACT OBSTRUCTION AFTER PROCEDURE: ICD-10-CM

## 2025-05-20 LAB — LVEF ECHO: NORMAL

## 2025-05-20 PROCEDURE — 93306 TTE W/DOPPLER COMPLETE: CPT

## 2025-05-21 ENCOUNTER — TELEPHONE (OUTPATIENT)
Dept: CARDIOLOGY | Facility: CLINIC | Age: 80
End: 2025-05-21
Payer: MEDICARE

## 2025-05-21 DIAGNOSIS — Q24.8 LEFT VENTRICULAR OUTFLOW TRACT OBSTRUCTION: ICD-10-CM

## 2025-05-21 DIAGNOSIS — I42.1 HOCM (HYPERTROPHIC OBSTRUCTIVE CARDIOMYOPATHY) (H): ICD-10-CM

## 2025-05-21 DIAGNOSIS — I47.10 SVT (SUPRAVENTRICULAR TACHYCARDIA): ICD-10-CM

## 2025-05-21 DIAGNOSIS — I48.92 ATRIAL FLUTTER, UNSPECIFIED TYPE (H): ICD-10-CM

## 2025-05-21 NOTE — TELEPHONE ENCOUNTER
Date: 5/21/2025    Time of Call: 8:55 AM     Diagnosis:  HOCM     [ TORB ] Ordering provider: Dr. Kinsey  Order: Continue mavacamten (camzyos) 5 mg with routine echocardiogram in 4 weeks along with follow up visit as scheduled on 6/10.      Order received by: Mark Shelley     Follow-up/additional notes: PSF submitted on REMs and Rx sent to Pharmacy for dispensing. Called and left message for patient to inform her refills for both eliquis and camzyos 5 mg were sent in. Requested patient to reach out to filling pharmacies to arrange for delivery. Reviewed echo completed yesterday and upcoming visit with Dr. Kinsey with echo prior on 6/10.

## 2025-05-21 NOTE — TELEPHONE ENCOUNTER
Pt called and looking to speak with Mark in regards to her Eliquis. She stated that she requested the refill 2 wks ago to her pharmacy but she hasn't received and thing. She is requesting refills for her mediation to the [facility] mail out pharmacy. Pt stated that she only has 3 days left of the medication.

## 2025-05-28 ENCOUNTER — TELEPHONE (OUTPATIENT)
Dept: CARDIOLOGY | Facility: CLINIC | Age: 80
End: 2025-05-28
Payer: MEDICARE

## 2025-05-28 NOTE — TELEPHONE ENCOUNTER
Pt left a message on 5/27 at 3:52pm stating that she had get echo completed on 5/20 for her Camzyos therapy and would like to have the results. She also stated that she wanted to make sure that a refill was sent to the pharmacy as she only has a couple pills left. She is looking for a call back to further discuss.

## 2025-05-28 NOTE — TELEPHONE ENCOUNTER
Called and left message for Neelima to inform her Rx for camzyos was sent to [facility] specialty pharmacy on 5/21. Encouraged patient to reach out and call [facility] at [phone] to set up next delivery.     Mark Shelley RN

## 2025-06-09 NOTE — PROGRESS NOTES
HPI:   Initial Visit 06/11/2024  Neelima Zuniga is a 80 year old female with history of SVT (previously on metoprolol, flecainide, and digoxin), htn, and HCM    Initially referred for LVOT obstruction, found on echo after patient was initiated on hydrochlorothiazide.     She goes to the [facility] three times a week, sometimes twice a day. She participates in silver sneaker classes and is able to do these without any symptoms chest pain, dyspnea at rest or with exertion, PND, orthopnea, peripheral edema, palpitations, lightheadedness or syncope.     However she states that when she walks slowly on a flat surface she will experience shortness of breath, this has been going on for a while and there has been no change in the severity of the symptoms. She first noticed this in December 2023 when she was on a walking tour of [facility] and had to walk quickly to keep up with the . She is able to perform ADLs including grocery shopping. She states she does not experience shortness of breath while she is grocery shopping and she is unsure if this is because she leans on the cart for stability.    Interval History 09/03/2024  Neelima is doing okay today. She underwent electrical cardioversion in early August and reports improvement, but not resolution, of NDIAYE at that time. However, her symptoms worsened about 2-3 weeks ago, when she suspects that she went back into atrial flutter. Otherwise, her symptoms are at baseline. No chest pain. She has intermittent palpitations which are consistent with prior supraventricular arrhythmias. No lightheadedness or syncope. No orthopnea, PND, or SEBASTIAN.     Interval History 3/11/25:  Today, patient presents alone.. Underwent successful cardioversion on 9/5/24 for atrial flutter. She then underwent a successful atrial flutter ablation (CTI) on 12/30/24 with Dr. Bowser. She was admitted following this for hypoxemia, discharged the following day. Digoxin was discontinued and BB  increased on follow up EP visit on 3/7/25. TTE 3/10/25 with septal thickness of 1.8 cm, peak resting gradient 82 mmHg. She feels that her is SOB is still not improved. She feels that ambulation of short periods se still have SOB. She is worried that her April trip to [place] will be missed due to her ongoing NDIAYE. Denies exertional chest pain or pressure. Possible single episode of palpitations since her ablation. Denies syncope. She has not increased her BB yet and only takes her 12.5 mg at night.     06/10/25:  She presents for 12-week follow up on mavacamten (started 4/1/25.)    - Shortness of breath improved since starting mavacamten, previously experienced while walking across parking lot at the [facility]   - She now experiences only occasional shortness of breath when walking fast or in a hurry  - Able to participate in exercise classes without significant shortness of breath  - Difficulty with stairs, not easy to climb  - Balance issues, possibly age-related  - Drowsiness and mild headache, possibly related to heart issues or medication  - Swelling in legs, possibly related to heart issues or leaky veins  - Bloodshot eye occurrences, possibly related to Eliquis or external factors  - History of breast cancer in 2007, implant encapsulation noted  - Discussed functional capacity, noting improvement in shortness of breath and ability to engage in exercise classes      PAST MEDICAL HISTORY:  Past Medical History:   Diagnosis Date    Allergic rhinitis     Amblyopia     left eye    Anxiety     Aortic valve sclerosis     Basal cell carcinoma (BCC) of eye 2007    Infraorbital left    Breast cancer (H) 2007    Cancer (H) 2009    right breast    Diabetes mellitus, type 2 (H)     Dysphagia     Gastric mass     Heart murmur     HTN (hypertension)     Hyperlipidemia     Left ventricular hypertrophy     Left ventricular outflow tract obstruction     Osteopenia     Palpitations     Reflux esophagitis     Sleep apnea     CPAP    SVT  "(supraventricular tachycardia)     Uterine fibroid        CURRENT MEDICATIONS:  Current Outpatient Medications   Medication Sig Dispense Refill    apixaban ANTICOAGULANT (ELIQUIS ANTICOAGULANT) 5 MG tablet Take 1 tablet (5 mg) by mouth 2 times daily. 180 tablet 3    calcium-vitamin D (CALTRATE) 600-400 MG-UNIT per tablet Take 1 tablet by mouth 2 times daily      Cholecalciferol (D 1000) 25 MCG (1000 UT) CAPS Take 1 tablet by mouth daily.      eszopiclone (LUNESTA) 2 MG tablet Take 2 mg by mouth nightly as needed for sleep      Glucosamine Sulfate 1500 MG PACK Take 1,500 mg by mouth daily      Lactobacillus Rhamnosus, GG, ( PROBIOTIC DIGESTIVE CARE) CAPS Take 1 capsule by mouth daily      losartan (COZAAR) 100 MG tablet Take 100 mg by mouth every evening.      magnesium oxide 400 MG CAPS Take 1 capsule by mouth daily.      mavacamten (CAMZYOS) 5 MG capsule Take 1 capsule (5 mg) by mouth daily. 30 capsule 0    metoprolol succinate ER (TOPROL XL) 25 MG 24 hr tablet Take 1 tablet (25 mg) by mouth daily. 90 tablet 3    multivitamin, therapeutic (THERA-VIT) TABS tablet Take 1 tablet by mouth daily.      polyethylene glycol (MIRALAX/GLYCOLAX) packet Take 1 packet by mouth daily      rosuvastatin (CRESTOR) 10 MG tablet Take 10 mg by mouth daily.      saline 0.65 % SOLN Spray 1 spray in nostril as needed.      venlafaxine (EFFEXOR XR) 37.5 MG 24 hr capsule Take 1 capsule (37.5 mg) by mouth daily 90 capsule 3       ALLERGIES:     Allergies   Allergen Reactions    Erythromycin     Seasonal Allergies      Other Reaction(s): Unknown    Sulfamethoxazole-Trimethoprim Hives    Sulfa Antibiotics Rash       FAMILY HISTORY:  Family History   Problem Relation Age of Onset    Macular Degeneration Father     Glaucoma Father     Glaucoma Paternal Grandmother     Diabetes Mother     Hypertension Mother     Hypertension Father     Hyperlipidemia Father      No family history of sudden death.    Mom: CHF  Paternal GF:  from \"heart " "issues\"   Sister: Sofi     SOCIAL HISTORY:  Social History     Tobacco Use    Smoking status: Never    Smokeless tobacco: Never   Vaping Use    Vaping status: Never Used   Substance Use Topics    Alcohol use: Not Currently    Drug use: No       ROS:   A comprehensive 14 point review of systems is negative other than as mentioned in HPI.    Exam:  /50 (BP Location: Right arm, Patient Position: Chair, Cuff Size: Adult Regular)   Pulse 70   Wt 77.1 kg (169 lb 14.4 oz)   SpO2 94%   BMI 29.16 kg/m    GENERAL APPEARANCE: healthy, alert and no distress  EYES: no icterus, no xanthelasmas  ENT: normal palate, mucosa moist, no central cyanosis  NECK: JVP not elevated  RESPIRATORY: lungs clear to auscultation - no rales, rhonchi or wheezes, no use of accessory muscles, no retractions, respirations are unlabored, normal respiratory rate  CARDIOVASCULAR: regular rhythm, normal S1 with physiologic split S2, no S3 or S4, IV/VI systolic murmur at sternal border.   EXTREMITIES: no edema  NEURO: alert and oriented to person/place/time, normal speech  SKIN: no ecchymoses, no rashes.  PSYCH: cooperative, affect appropriate.     Labs:  Reviewed.     Testing/Procedures:  TTE 3/10/25:  Normal biventricular function, septal thickness of 1.8 cm, peak resting gradient 82 mmHg.     09/03/24 TTE  Known HCM with asymmetric hypertrophy. CANDIDA with septal contact resulting in LVOTO. LVOT gradient at rest 68 mmHg, Valsalva 82 mmHg. RAP ~15 mmHg. Overall, similar to prior echo 01/19/24.     04/2024 Zio   3 runs of nonsustained VT, 1 of which is associated with symptoms.   Runs of nonsustained SVT.  Symptomatic episodes corresponded to sinus rhythm with PACs, PVCs, or a run of nonsustained VT.    3/20/24 CMR  1.  Normal left ventricular size.  Severe asymmetric left ventricular hypertrophy.  The maximal thickness  of basal anteroseptal wall is 1.7 cm.  The thickness of posterolateral wall is 1.2 cm. There is obvious  accelerated LVOT flow " velocity indicating some obstruction.  However, there is no anterior septal motion of  mitral valve. There is no rest perfusion defect.  The calculated left ventricular ejection fraction is 62%.  2.  No prior myocardial infarction, myocardial scar or other infiltrative process.  T1 mapping value is  mildly elevated to 1016 ms (normal reference less than 1000 ms).  3.  Normal right ventricular size and systolic function.  4.  Mild to moderate mitral and tricuspid valve regurgitation.    1/19/24 TTE  Left ventricular size, wall motion and function are normal. The ejection  fraction is 60-65%.  Normal right ventricle size and systolic function.  The left atrium is mildly dilated.  Mild to moderate valvular aortic stenosis.  IVC diameter and respiratory changes fall into an intermediate range  suggesting an RA pressure of 8 mmHg.    1/12/23 TTE   Left ventricular size, wall motion and function are normal. The ejection  fraction is 60-65%.  Normal right ventricle size and systolic function.  The left atrium is mildly dilated.  Mild to moderate valvular aortic stenosis.  IVC diameter and respiratory changes fall into an intermediate range  suggesting an RA pressure of 8 mmHg.    1/25/22 Stress Echo    Interpretation Summary  1. Normal stress echocardiogram without evidence of stress induced ischemia.  2. Normal resting LV systolic performance with an ejection fraction of 55-60%.  There is normal improvement in left ventricular systolic performance with a  peak ejection fraction of 70-75%.  3. No ECG evidence of ischemia.  4. No anginal chest pain reported with exercise.  5. Average functional capacity for age.  6. Two PVCs and a solitary couplet were noted. No other rhythm disturbances  detected.    I personally visualized and interpreted:    TTE 06/10/25 (mavacamten 5 mg daily): LVEF=60-65%. Valsalva LVOT gradient ~50 mmHg      Assessment and Plan:   Ms Neelima Zuniga is a 79 year old lady with a history of HCM with  obstruction who presents to clinic today for follow up.     1.HCM with LVOTO (oHCM/HOCM), improved  2. Aflutter [id number] age (2), F, HTN) s/p ablation  Neelima's LVOTO and associated symptoms have improved greatly on mavacamten (started 4/1/25) but she still has moderate residual LVOTO (Valsalva LVOT gradient ~50 mmHg) and NYHA class II exertional dyspnea.   Plan  -increase mavacamten to 10 mg daily  - continue metoprolol 25 mg daily  -TTEs per protocol  - Continue losartan 100 mg daily for HTN  - Continue Apixaban  - EP follow up  - Follow up 3 months with Dacia Ansari NP or with me with TTE prior    3. Preoperative evaluation for possible breast surgery (revision of prior reconstruction)  Neelima has greatly improved control of her LVOTO on mavacamten (which I anticipate to improve further with additional up-titration) and is well-compensated from an HCM perspective. Neelima is of acceptable cardiac risk to proceed with the planned surgery (including any and all associated forms of anesthesia/sedation including general anesthesia) provided the following precautions are taken:  Beta blockers, diltiazem/verapamil, and/or mavacamten should be continued perioperatively including on the date of surgery to prevent worsening LVOT obstruction  If general anesthesia induction (and associated hemodynamic shifts) can be avoided by an alternative anesthetic strategy, this would be preferable  Hypovolemia should be avoided; volume should be aggressively replaced  Peripheral vasodilators (nitrates, dihydropyridine calcium channel blockers, etc.) should be avoided  Positive chronotropic/inotropic agents should be avoided   If pressors are required, pure vasoconstrictors (phenylephrine, vasopressin, etc.) are preferred      I spent a total of 44 minutes on the day of the visit.   Time spent by me today doing chart review, history and exam, documentation and further activities per the note  The longitudinal plan of care for the  diagnosis(es)/condition(s) as documented were addressed during this visit. Due to the added complexity in care, I will continue to support Neelima in the subsequent management and with ongoing continuity of care.  Guy Kinsey MD  Cardiology

## 2025-06-10 ENCOUNTER — RESULTS FOLLOW-UP (OUTPATIENT)
Dept: CARDIOLOGY | Facility: CLINIC | Age: 80
End: 2025-06-10

## 2025-06-10 ENCOUNTER — ANCILLARY PROCEDURE (OUTPATIENT)
Dept: CARDIOLOGY | Facility: CLINIC | Age: 80
End: 2025-06-10
Attending: INTERNAL MEDICINE
Payer: MEDICARE

## 2025-06-10 VITALS
SYSTOLIC BLOOD PRESSURE: 120 MMHG | OXYGEN SATURATION: 94 % | BODY MASS INDEX: 29.16 KG/M2 | HEART RATE: 70 BPM | DIASTOLIC BLOOD PRESSURE: 50 MMHG | WEIGHT: 169.9 LBS

## 2025-06-10 DIAGNOSIS — R06.09 DOE (DYSPNEA ON EXERTION): ICD-10-CM

## 2025-06-10 DIAGNOSIS — I42.1 HOCM (HYPERTROPHIC OBSTRUCTIVE CARDIOMYOPATHY) (H): ICD-10-CM

## 2025-06-10 DIAGNOSIS — I51.89 LEFT VENTRICULAR OUTFLOW TRACT OBSTRUCTION AFTER PROCEDURE: ICD-10-CM

## 2025-06-10 DIAGNOSIS — I97.89 LEFT VENTRICULAR OUTFLOW TRACT OBSTRUCTION AFTER PROCEDURE: ICD-10-CM

## 2025-06-10 DIAGNOSIS — I35.0 NONRHEUMATIC AORTIC VALVE STENOSIS: ICD-10-CM

## 2025-06-10 DIAGNOSIS — I48.3 TYPICAL ATRIAL FLUTTER (H): ICD-10-CM

## 2025-06-10 LAB — LVEF ECHO: NORMAL

## 2025-06-10 PROCEDURE — G0463 HOSPITAL OUTPT CLINIC VISIT: HCPCS | Performed by: INTERNAL MEDICINE

## 2025-06-10 PROCEDURE — 93306 TTE W/DOPPLER COMPLETE: CPT | Performed by: STUDENT IN AN ORGANIZED HEALTH CARE EDUCATION/TRAINING PROGRAM

## 2025-06-10 RX ORDER — PANTOPRAZOLE SODIUM 40 MG/1
40 TABLET, DELAYED RELEASE ORAL 2 TIMES DAILY
COMMUNITY
Start: 2025-06-03

## 2025-06-10 ASSESSMENT — PAIN SCALES - GENERAL: PAINLEVEL_OUTOF10: NO PAIN (0)

## 2025-06-10 NOTE — NURSING NOTE
Chief Complaint   Patient presents with    Follow Up      79 year old female with history of HCM s/p AFL ablation 12/2024 presenting for 3 month follow up after starting camzyos         Vitals were taken, medications reconciled.    Mana Kesley, EMT    11:31 AM

## 2025-06-10 NOTE — PATIENT INSTRUCTIONS
"  Thank you for visiting the Adult [facility] Cardi[facility]etics Clinic at the [facility].    Cardiology Providers you saw during your visit:  Guy Kinsey MD    Diagnosis:  HCM    Results:  Guy Kinsey MD reviewed the results of your echo cardiopulmonary testing today in clinic.     Recommendations for you:    Increase Camzyos to 10 mg once daily. We will send a new prescription for you [facility].   We will reach out to help schedule your next echocardiogram in 4 weeks.       General Cardiac Recommendations:  Continue to eat a heart healthy, low salt diet.  Continue to get 20-30 minutes of aerobic activity, 4-5 days per week.  Examples of aerobic activity include walking, running, swimming, cycling, etc.  Continue to observe good oral hygiene, with regular dental visits.      SBE prophylaxis:   Yes____  No__x__    Exercise restrictions:   Yes__X__  No____         If yes, list restrictions:  Must be allowed to rest if fatigued or SOB      FASTING CHOLESTEROL was checked in the last 5 years YES__x__  NO____ (2024)  If no, please follow up with your primary care physician. You should have a cholesterol screening every 5 years.      Follow-up:  Follow up with Dacia Ansari in 3 months with routine echo prior.     If you have questions or concerns please contact us at:    Mark Shelley RN, BSN     Luciana Manzo (Scheduling)  Nurse Care Coordinator     Clinic   CV Genetics      Adult Congenital and CV Genetic  [facility]   [facility]  (P) [phone]     (P) [phone]  (F) [phone]     (F) [phone]        For after hours urgent needs, call [phone] and ask to speak to the \"On-Call Cardiologist.\"      For emergencies call 259.    [facility]  Mail Code [id number]  [address] [place] [address], [place]  [address]   "

## 2025-06-10 NOTE — LETTER
6/10/2025      RE: Neelima Zuniga  [address] [place] [address]       Dear Colleague,    Thank you for the opportunity to participate in the care of your patient, Neelima Zuniga, at the [facility] [place] at [facility]. Please see a copy of my visit note below.      HPI:   Initial Visit 06/11/2024  Neelima Zuniga is a 80 year old female with history of SVT (previously on metoprolol, flecainide, and digoxin), htn, and HCM    Initially referred for LVOT obstruction, found on echo after patient was initiated on hydrochlorothiazide.     She goes to the [facility] three times a week, sometimes twice a day. She participates in silver sneaker classes and is able to do these without any symptoms chest pain, dyspnea at rest or with exertion, PND, orthopnea, peripheral edema, palpitations, lightheadedness or syncope.     However she states that when she walks slowly on a flat surface she will experience shortness of breath, this has been going on for a while and there has been no change in the severity of the symptoms. She first noticed this in December 2023 when she was on a walking tour of [facility] and had to walk quickly to keep up with the . She is able to perform ADLs including grocery shopping. She states she does not experience shortness of breath while she is grocery shopping and she is unsure if this is because she leans on the cart for stability.    Interval History 09/03/2024  Neelima is doing okay today. She underwent electrical cardioversion in early August and reports improvement, but not resolution, of NDIAYE at that time. However, her symptoms worsened about 2-3 weeks ago, when she suspects that she went back into atrial flutter. Otherwise, her symptoms are at baseline. No chest pain. She has intermittent palpitations which are consistent with prior supraventricular arrhythmias. No lightheadedness or syncope. No  orthopnea, PND, or SEBASTIAN.     Interval History 3/11/25:  Today, patient presents alone.. Underwent successful cardioversion on 9/5/24 for atrial flutter. She then underwent a successful atrial flutter ablation (CTI) on 12/30/24 with Dr. Bowser. She was admitted following this for hypoxemia, discharged the following day. Digoxin was discontinued and BB increased on follow up EP visit on 3/7/25. TTE 3/10/25 with septal thickness of 1.8 cm, peak resting gradient 82 mmHg. She feels that her is SOB is still not improved. She feels that ambulation of short periods se still have SOB. She is worried that her April trip to [place] will be missed due to her ongoing NDIAYE. Denies exertional chest pain or pressure. Possible single episode of palpitations since her ablation. Denies syncope. She has not increased her BB yet and only takes her 12.5 mg at night.     06/10/25:  She presents for 12-week follow up on mavacamten (started 4/1/25.)    - Shortness of breath improved since starting mavacamten, previously experienced while walking across parking lot at the [facility]   - She now experiences only occasional shortness of breath when walking fast or in a hurry  - Able to participate in exercise classes without significant shortness of breath  - Difficulty with stairs, not easy to climb  - Balance issues, possibly age-related  - Drowsiness and mild headache, possibly related to heart issues or medication  - Swelling in legs, possibly related to heart issues or leaky veins  - Bloodshot eye occurrences, possibly related to Eliquis or external factors  - History of breast cancer in 2007, implant encapsulation noted  - Discussed functional capacity, noting improvement in shortness of breath and ability to engage in exercise classes      PAST MEDICAL HISTORY:  Past Medical History:   Diagnosis Date     Allergic rhinitis      Amblyopia     left eye     Anxiety      Aortic valve sclerosis      Basal cell carcinoma (BCC) of eye 2007     Infraorbital left     Breast cancer (H) 2007     Cancer (H) 2009    right breast     Diabetes mellitus, type 2 (H)      Dysphagia      Gastric mass      Heart murmur      HTN (hypertension)      Hyperlipidemia      Left ventricular hypertrophy      Left ventricular outflow tract obstruction      Osteopenia      Palpitations      Reflux esophagitis      Sleep apnea     CPAP     SVT (supraventricular tachycardia)      Uterine fibroid        CURRENT MEDICATIONS:  Current Outpatient Medications   Medication Sig Dispense Refill     apixaban ANTICOAGULANT (ELIQUIS ANTICOAGULANT) 5 MG tablet Take 1 tablet (5 mg) by mouth 2 times daily. 180 tablet 3     calcium-vitamin D (CALTRATE) 600-400 MG-UNIT per tablet Take 1 tablet by mouth 2 times daily       Cholecalciferol (D 1000) 25 MCG (1000 UT) CAPS Take 1 tablet by mouth daily.       eszopiclone (LUNESTA) 2 MG tablet Take 2 mg by mouth nightly as needed for sleep       Glucosamine Sulfate 1500 MG PACK Take 1,500 mg by mouth daily       Lactobacillus Rhamnosus, GG, ( PROBIOTIC DIGESTIVE CARE) CAPS Take 1 capsule by mouth daily       losartan (COZAAR) 100 MG tablet Take 100 mg by mouth every evening.       magnesium oxide 400 MG CAPS Take 1 capsule by mouth daily.       mavacamten (CAMZYOS) 5 MG capsule Take 1 capsule (5 mg) by mouth daily. 30 capsule 0     metoprolol succinate ER (TOPROL XL) 25 MG 24 hr tablet Take 1 tablet (25 mg) by mouth daily. 90 tablet 3     multivitamin, therapeutic (THERA-VIT) TABS tablet Take 1 tablet by mouth daily.       polyethylene glycol (MIRALAX/GLYCOLAX) packet Take 1 packet by mouth daily       rosuvastatin (CRESTOR) 10 MG tablet Take 10 mg by mouth daily.       saline 0.65 % SOLN Spray 1 spray in nostril as needed.       venlafaxine (EFFEXOR XR) 37.5 MG 24 hr capsule Take 1 capsule (37.5 mg) by mouth daily 90 capsule 3       ALLERGIES:     Allergies   Allergen Reactions     Erythromycin      Seasonal Allergies      Other Reaction(s): Unknown  "    Sulfamethoxazole-Trimethoprim Hives     Sulfa Antibiotics Rash       FAMILY HISTORY:  Family History   Problem Relation Age of Onset     Macular Degeneration Father      Glaucoma Father      Glaucoma Paternal Grandmother      Diabetes Mother      Hypertension Mother      Hypertension Father      Hyperlipidemia Father      No family history of sudden death.    Mom: CHF  Paternal GF:  from \"heart issues\"   Sister: Afib     SOCIAL HISTORY:  Social History     Tobacco Use     Smoking status: Never     Smokeless tobacco: Never   Vaping Use     Vaping status: Never Used   Substance Use Topics     Alcohol use: Not Currently     Drug use: No       ROS:   A comprehensive 14 point review of systems is negative other than as mentioned in HPI.    Exam:  /50 (BP Location: Right arm, Patient Position: Chair, Cuff Size: Adult Regular)   Pulse 70   Wt 77.1 kg (169 lb 14.4 oz)   SpO2 94%   BMI 29.16 kg/m    GENERAL APPEARANCE: healthy, alert and no distress  EYES: no icterus, no xanthelasmas  ENT: normal palate, mucosa moist, no central cyanosis  NECK: JVP not elevated  RESPIRATORY: lungs clear to auscultation - no rales, rhonchi or wheezes, no use of accessory muscles, no retractions, respirations are unlabored, normal respiratory rate  CARDIOVASCULAR: regular rhythm, normal S1 with physiologic split S2, no S3 or S4, IV/VI systolic murmur at sternal border.   EXTREMITIES: no edema  NEURO: alert and oriented to person/place/time, normal speech  SKIN: no ecchymoses, no rashes.  PSYCH: cooperative, affect appropriate.     Labs:  Reviewed.     Testing/Procedures:  TTE 3/10/25:  Normal biventricular function, septal thickness of 1.8 cm, peak resting gradient 82 mmHg.     24 TTE  Known HCM with asymmetric hypertrophy. CANDIDA with septal contact resulting in LVOTO. LVOT gradient at rest 68 mmHg, Valsalva 82 mmHg. RAP ~15 mmHg. Overall, similar to prior echo 24.     2024 Zio   3 runs of nonsustained VT, 1 of " which is associated with symptoms.   Runs of nonsustained SVT.  Symptomatic episodes corresponded to sinus rhythm with PACs, PVCs, or a run of nonsustained VT.    3/20/24 CMR  1.  Normal left ventricular size.  Severe asymmetric left ventricular hypertrophy.  The maximal thickness  of basal anteroseptal wall is 1.7 cm.  The thickness of posterolateral wall is 1.2 cm. There is obvious  accelerated LVOT flow velocity indicating some obstruction.  However, there is no anterior septal motion of  mitral valve. There is no rest perfusion defect.  The calculated left ventricular ejection fraction is 62%.  2.  No prior myocardial infarction, myocardial scar or other infiltrative process.  T1 mapping value is  mildly elevated to 1016 ms (normal reference less than 1000 ms).  3.  Normal right ventricular size and systolic function.  4.  Mild to moderate mitral and tricuspid valve regurgitation.    1/19/24 TTE  Left ventricular size, wall motion and function are normal. The ejection  fraction is 60-65%.  Normal right ventricle size and systolic function.  The left atrium is mildly dilated.  Mild to moderate valvular aortic stenosis.  IVC diameter and respiratory changes fall into an intermediate range  suggesting an RA pressure of 8 mmHg.    1/12/23 TTE   Left ventricular size, wall motion and function are normal. The ejection  fraction is 60-65%.  Normal right ventricle size and systolic function.  The left atrium is mildly dilated.  Mild to moderate valvular aortic stenosis.  IVC diameter and respiratory changes fall into an intermediate range  suggesting an RA pressure of 8 mmHg.    1/25/22 Stress Echo    Interpretation Summary  1. Normal stress echocardiogram without evidence of stress induced ischemia.  2. Normal resting LV systolic performance with an ejection fraction of 55-60%.  There is normal improvement in left ventricular systolic performance with a  peak ejection fraction of 70-75%.  3. No ECG evidence of  ischemia.  4. No anginal chest pain reported with exercise.  5. Average functional capacity for age.  6. Two PVCs and a solitary couplet were noted. No other rhythm disturbances  detected.    I personally visualized and interpreted:    TTE 06/10/25 (mavacamten 5 mg daily): LVEF=60-65%. Valsalva LVOT gradient ~50 mmHg      Assessment and Plan:   Ms Neelima Zuniga is a 79 year old lady with a history of HCM with obstruction who presents to clinic today for follow up.     1.HCM with LVOTO (oHCM/HOCM), improved  2. Aflutter [id number]=1 age (2), F, HTN) s/p ablation  Neelima's LVOTO and associated symptoms have improved greatly on mavacamten (started 4/1/25) but she still has moderate residual LVOTO (Valsalva LVOT gradient ~50 mmHg) and NYHA class II exertional dyspnea.   Plan  -increase mavacamten to 10 mg daily  - continue metoprolol 25 mg daily  -TTEs per protocol  - Continue losartan 100 mg daily for HTN  - Continue Apixaban  - EP follow up  - Follow up 3 months with Dacia Ansari NP or with me with TTE prior    3. Preoperative evaluation for possible breast surgery (revision of prior reconstruction)  Neelima has greatly improved control of her LVOTO on mavacamten (which I anticipate to improve further with additional up-titration) and is well-compensated from an HCM perspective. Neelima is of acceptable cardiac risk to proceed with the planned surgery (including any and all associated forms of anesthesia/sedation including general anesthesia) provided the following precautions are taken:  Beta blockers, diltiazem/verapamil, and/or mavacamten should be continued perioperatively including on the date of surgery to prevent worsening LVOT obstruction  If general anesthesia induction (and associated hemodynamic shifts) can be avoided by an alternative anesthetic strategy, this would be preferable  Hypovolemia should be avoided; volume should be aggressively replaced  Peripheral vasodilators (nitrates, dihydropyridine  calcium channel blockers, etc.) should be avoided  Positive chronotropic/inotropic agents should be avoided   If pressors are required, pure vasoconstrictors (phenylephrine, vasopressin, etc.) are preferred      I spent a total of 44 minutes on the day of the visit.   Time spent by me today doing chart review, history and exam, documentation and further activities per the note  The longitudinal plan of care for the diagnosis(es)/condition(s) as documented were addressed during this visit. Due to the added complexity in care, I will continue to support Neelima in the subsequent management and with ongoing continuity of care.  Guy Kinsey MD  Cardiology         Please do not hesitate to contact me if you have any questions/concerns.     Sincerely,     Guy Kinsey MD

## 2025-06-25 ENCOUNTER — HOSPITAL ENCOUNTER (OUTPATIENT)
Dept: CT IMAGING | Facility: HOSPITAL | Age: 80
Discharge: HOME | End: 2025-06-25
Attending: INTERNAL MEDICINE
Payer: MEDICARE

## 2025-06-25 DIAGNOSIS — K59.00 CONSTIPATION, UNSPECIFIED CONSTIPATION TYPE: ICD-10-CM

## 2025-06-25 DIAGNOSIS — R14.0 ABDOMINAL DISTENSION: ICD-10-CM

## 2025-06-25 DIAGNOSIS — R10.9 UNSPECIFIED ABDOMINAL PAIN: ICD-10-CM

## 2025-06-25 LAB
CREAT BLD-MCNC: 0.8 MG/DL (ref 0.5–1)
EGFRCR SERPLBLD CKD-EPI 2021: >60 ML/MIN/1.73M2

## 2025-06-25 PROCEDURE — 250N000009 HC RX 250

## 2025-06-25 PROCEDURE — 74177 CT ABD & PELVIS W/CONTRAST: CPT

## 2025-06-25 PROCEDURE — 82565 ASSAY OF CREATININE: CPT

## 2025-06-25 PROCEDURE — 250N000011 HC RX IP 250 OP 636

## 2025-06-25 RX ORDER — IOPAMIDOL 755 MG/ML
83 INJECTION, SOLUTION INTRAVASCULAR ONCE
Status: COMPLETED | OUTPATIENT
Start: 2025-06-25 | End: 2025-06-25

## 2025-06-25 RX ADMIN — IOPAMIDOL 83 ML: 755 INJECTION, SOLUTION INTRAVENOUS at 09:55

## 2025-06-25 RX ADMIN — SODIUM CHLORIDE 71 ML: 9 INJECTION, SOLUTION INTRAVENOUS at 09:57

## 2025-07-07 DIAGNOSIS — I42.1 HOCM (HYPERTROPHIC OBSTRUCTIVE CARDIOMYOPATHY) (H): ICD-10-CM

## 2025-07-08 NOTE — DISCHARGE INSTRUCTIONS
Post-Ablation Discharge Instructions    Plan:   You were not able to tolerate the procedure with conscious sedation so we had to stop it. We will need to bring you back to finish the procedure with anesthesia doing the sedation for your comfort and safety. We will call you to arrange this.   Continue your current medications at this time.     Care of groin site:        Remove the Band-Aid after 24 hours. If there is minor oozing, apply another Band-aid and remove it after 12 hours.         Do NOT take a bath, use a hot tub, pool, or submerse in water for at least 3 days You may shower.         It is normal to have a small bruise or lump at the site.        Do not scrub the site.        Do not use lotion or powder near the puncture site for 3 days.        For the first 2 days: Do not stoop or squat. When you cough, sneeze or move your bowels, hold your hand over the puncture site and press gently.        Do not lift more than 10 pounds or exertional activity for 10 days.      If you start bleeding from the site in your groin:  Lie down flat and press firmly on the site.  Call your physician immediately, or, come to the emergency room.    Call 911 right away if you have bleeding that is heavy or does not stop.     Call your doctor/provider if:        You have a large or growing hard lump around the site.        The site is red, swollen, hot or tender.        Blood or fluid is draining from the site.        You have chills or a fever greater than 101 F (38 C).        Your leg or arm turns bluish, feels numb or cool.        You have hives, a rash or unusual itching.     Cardiovascular Clinic:   [address] [place] [address]. [place] [address]    Your Care Team:  EP Cardiology   Telephone Number     Nurses:   Ruth SLADE [phone]    After business hours: [phone], select option 4, and ask for EP Fellow on-call to be paged.   Non-procedure scheduling:    Anabell SEAMAN   [phone]   Procedure  "Message send "high Importance" to pharmacy requesting [email or web address] spectralink number provided if questions/concerns.   " scheduling:    Alisa Wilkinson   [phone]   Device Clinic (Pacemakers, ICDs, Loop Recorders)    During business hours: [phone]  After business hours:   [phone]- select option 4 and ask for job code [id number].       As always, Thank you for trusting us with your health care needs

## 2025-07-09 NOTE — TELEPHONE ENCOUNTER
Last Written Prescription:  mavacamten (CAMZYOS) 10 MG capsule 30 capsule 0 6/17/2025 -- No   Sig - Route: Take 1 capsule (10 mg) by mouth daily. - Oral     ----------------------  Last Visit Date: 6/10/2025  [facility]      Future Visit Date:    10/13/2025 3:00 PM (40 min)  [facility]   Arrive by:  2:45 PM   RETURN [facility]   Rfl Status: Pending Review   [facility]   Dayana Ansari APRN CNP     ----------------------    Refill decision: Medication unable to be refilled by RN due to:     Medication not on refill protocol.          Request from pharmacy:  Requested Prescriptions   Pending Prescriptions Disp Refills    mavacamten (CAMZYOS) 10 MG capsule 30 capsule 0     Sig: Take 1 capsule (10 mg) by mouth daily.       There is no refill protocol information for this order

## 2025-07-16 ENCOUNTER — HOSPITAL ENCOUNTER (OUTPATIENT)
Dept: CARDIOLOGY | Facility: CLINIC | Age: 80
Discharge: HOME | End: 2025-07-16
Attending: INTERNAL MEDICINE
Payer: MEDICARE

## 2025-07-16 ENCOUNTER — TELEPHONE (OUTPATIENT)
Dept: CARDIOLOGY | Facility: CLINIC | Age: 80
End: 2025-07-16

## 2025-07-16 DIAGNOSIS — I42.1 HOCM (HYPERTROPHIC OBSTRUCTIVE CARDIOMYOPATHY) (H): ICD-10-CM

## 2025-07-16 DIAGNOSIS — I97.89 LEFT VENTRICULAR OUTFLOW TRACT OBSTRUCTION AFTER PROCEDURE: ICD-10-CM

## 2025-07-16 DIAGNOSIS — I35.0 NONRHEUMATIC AORTIC VALVE STENOSIS: ICD-10-CM

## 2025-07-16 DIAGNOSIS — I51.89 LEFT VENTRICULAR OUTFLOW TRACT OBSTRUCTION AFTER PROCEDURE: ICD-10-CM

## 2025-07-16 LAB — LVEF ECHO: NORMAL

## 2025-07-16 PROCEDURE — 93306 TTE W/DOPPLER COMPLETE: CPT

## 2025-07-16 NOTE — TELEPHONE ENCOUNTER
Date: 7/16/2025    Time of Call: 2:52 PM     Diagnosis:  HOCM     [ TORB ] Ordering provider: Dr. Kinsey  Order: Continue mavacamten (camzyos) 10 mg with routine echocardiogram in 8 weeks. Expected on 9/5/25-9/10/25     Order received by: Mark Shelley     Follow-up/additional notes: PSF submitted on REMs and Rx sent to Pharmacy for dispensing. Spoke with Neelima to review echo from today with instructions to continue camzyos 5 mg once daily with next echo in 8 weeks. Confirmed echo date on 9/9/25 at 10:30 am and follow up visit on 10/13/25 with Dayana Ansari.

## 2025-08-30 ENCOUNTER — HEALTH MAINTENANCE LETTER (OUTPATIENT)
Age: 80
End: 2025-08-30

## (undated) DEVICE — SUCTION TIP YANKAUER W/O VENT K86

## (undated) DEVICE — KIT VENOUS FLUSH 60210177

## (undated) DEVICE — SHEATH HEMO FAST CATH RAMP 406965

## (undated) DEVICE — INTRODUCER SHEATH FAST-CATH CATH-LOCK 7FRX12CM 406702

## (undated) DEVICE — PACK HEART LEFT CUSTOM

## (undated) DEVICE — TUBE SET SMARKABLATE IRRIGATION

## (undated) DEVICE — SYR 10ML FINGER CONTROL W/O NDL 309695

## (undated) DEVICE — INTRO CATH 12CM 8.5FR FST-CATH

## (undated) DEVICE — CUSTOM PACK GEN MAJOR SBA5BGMHEA

## (undated) DEVICE — SOL WATER IRRIG 1000ML BOTTLE 2F7114

## (undated) DEVICE — TRAY PREP DRY SKIN SCRUB 067

## (undated) DEVICE — KIT MICROINTRODUCER VASCULAR VSI 4FRX0.018INX40CM 7195X

## (undated) DEVICE — PATCH CARTO 3 EXTERNAL REFERENCE 3D MAPPING CREFP6

## (undated) DEVICE — CATH NAV PENTARAY F CURVE

## (undated) DEVICE — DRAPE OR LAPAROTOMY KC 89228*

## (undated) DEVICE — ADH LIQUID MASTISOL TOPICAL VIAL 2-3ML 0523-48

## (undated) DEVICE — PLATE GROUNDING ADULT W/CORD 9165L

## (undated) DEVICE — SYR 01ML TBC SLIP TIP W/O NDL

## (undated) DEVICE — TUBING SUCTION MEDI-VAC 1/4"X20' N620A

## (undated) DEVICE — ELECTRODE DEFIB CADENCE 22550R

## (undated) DEVICE — INTRO SHEATH 4FRX10CM PINNACLE RSS402

## (undated) DEVICE — SUCTION MANIFOLD NEPTUNE 2 SYS 1 PORT 702-025-000

## (undated) DEVICE — NDL 30GA 1" 305128

## (undated) DEVICE — SOL NACL 0.9% IRRIG 1000ML BOTTLE 2F7124

## (undated) DEVICE — PREP POVIDONE IODINE SOLUTION 10% 4OZ BOTTLE 29906-004

## (undated) DEVICE — CATH THERMOCOOL SMARTTOUCH SF DF CURVE

## (undated) DEVICE — RETRIEVAL ESCP 230CM 2.5MM RTHNT 360D 5X3CM BX00711197

## (undated) DEVICE — DRSG KERLIX FLUFFS X5

## (undated) DEVICE — HYDROGEN PEROXIDE 4 OZ HP0304

## (undated) DEVICE — GLOVE SURG PI ULTRA TOUCH M SZ 6-1/2 LF

## (undated) DEVICE — REPRO BARD EP XT DECAPL STRBL DX EP CATH 6F

## (undated) DEVICE — TAPE ADH POROUS 3IN CURITY STD 7046C

## (undated) DEVICE — NEEDLE HYPO 22X1-1/2 SAFETY 305900

## (undated) DEVICE — LUBRICANT SURG 2 OZ STRL FLIP TOP 2OZLUB

## (undated) DEVICE — NEEDLE HYPO 18X1-1/2 SAFETY 305918

## (undated) DEVICE — FORCEP BIOPSY 2.3MM DISP COATED 000388

## (undated) DEVICE — SNARE OVAL SMALL DISP 100600

## (undated) DEVICE — GLOVE UNDER INDICATOR PI SZ 6.5 LF 41665

## (undated) DEVICE — ESU PENCIL SMOKE EVAC W/ROCKER SWITCH 0703-047-000

## (undated) DEVICE — TAPE TRANSPORE 2"X1.5YD 1534S-2

## (undated) DEVICE — GOWN LG DISP 9515

## (undated) RX ORDER — FENTANYL CITRATE 50 UG/ML
INJECTION, SOLUTION INTRAMUSCULAR; INTRAVENOUS
Status: DISPENSED
Start: 2024-11-25

## (undated) RX ORDER — PROPOFOL 10 MG/ML
INJECTION, EMULSION INTRAVENOUS
Status: DISPENSED
Start: 2024-12-30

## (undated) RX ORDER — FENTANYL CITRATE-0.9 % NACL/PF 10 MCG/ML
PLASTIC BAG, INJECTION (ML) INTRAVENOUS
Status: DISPENSED
Start: 2024-12-30

## (undated) RX ORDER — PROTAMINE SULFATE 10 MG/ML
INJECTION, SOLUTION INTRAVENOUS
Status: DISPENSED
Start: 2024-12-30

## (undated) RX ORDER — LIDOCAINE HYDROCHLORIDE 20 MG/ML
SOLUTION OROPHARYNGEAL
Status: DISPENSED
Start: 2024-07-17

## (undated) RX ORDER — FENTANYL CITRATE 50 UG/ML
INJECTION, SOLUTION INTRAMUSCULAR; INTRAVENOUS
Status: DISPENSED
Start: 2024-07-17

## (undated) RX ORDER — FUROSEMIDE 10 MG/ML
INJECTION INTRAMUSCULAR; INTRAVENOUS
Status: DISPENSED
Start: 2024-12-30

## (undated) RX ORDER — BUPIVACAINE HYDROCHLORIDE AND EPINEPHRINE 2.5; 5 MG/ML; UG/ML
INJECTION, SOLUTION INFILTRATION; PERINEURAL
Status: DISPENSED
Start: 2022-10-28

## (undated) RX ORDER — BUPIVACAINE HYDROCHLORIDE 5 MG/ML
INJECTION, SOLUTION EPIDURAL; INTRACAUDAL
Status: DISPENSED
Start: 2024-11-25

## (undated) RX ORDER — FENTANYL CITRATE 50 UG/ML
INJECTION, SOLUTION INTRAMUSCULAR; INTRAVENOUS
Status: DISPENSED
Start: 2024-12-30

## (undated) RX ORDER — ONDANSETRON 2 MG/ML
INJECTION INTRAMUSCULAR; INTRAVENOUS
Status: DISPENSED
Start: 2024-11-25

## (undated) RX ORDER — SODIUM CHLORIDE 9 MG/ML
INJECTION, SOLUTION INTRAVENOUS
Status: DISPENSED
Start: 2024-11-25

## (undated) RX ORDER — ONDANSETRON 2 MG/ML
INJECTION INTRAMUSCULAR; INTRAVENOUS
Status: DISPENSED
Start: 2024-03-22

## (undated) RX ORDER — BUPIVACAINE HYDROCHLORIDE 2.5 MG/ML
INJECTION, SOLUTION EPIDURAL; INFILTRATION; INTRACAUDAL
Status: DISPENSED
Start: 2024-12-30

## (undated) RX ORDER — HEPARIN SODIUM 1000 [USP'U]/ML
INJECTION, SOLUTION INTRAVENOUS; SUBCUTANEOUS
Status: DISPENSED
Start: 2024-12-30

## (undated) RX ORDER — ONDANSETRON 2 MG/ML
INJECTION INTRAMUSCULAR; INTRAVENOUS
Status: DISPENSED
Start: 2024-12-30

## (undated) RX ORDER — BUPIVACAINE HYDROCHLORIDE 2.5 MG/ML
INJECTION, SOLUTION EPIDURAL; INFILTRATION; INTRACAUDAL
Status: DISPENSED
Start: 2022-10-28

## (undated) RX ORDER — LABETALOL HYDROCHLORIDE 5 MG/ML
INJECTION, SOLUTION INTRAVENOUS
Status: DISPENSED
Start: 2024-12-30

## (undated) RX ORDER — CEFAZOLIN SODIUM 1 G/3ML
INJECTION, POWDER, FOR SOLUTION INTRAMUSCULAR; INTRAVENOUS
Status: DISPENSED
Start: 2024-12-30

## (undated) RX ORDER — PROPOFOL 10 MG/ML
INJECTION, EMULSION INTRAVENOUS
Status: DISPENSED
Start: 2024-03-22

## (undated) RX ORDER — CALCIUM CHLORIDE 100 MG/ML
INJECTION INTRAVENOUS; INTRAVENTRICULAR
Status: DISPENSED
Start: 2024-12-30

## (undated) RX ORDER — GINSENG 100 MG
CAPSULE ORAL
Status: DISPENSED
Start: 2022-10-28

## (undated) RX ORDER — MAGNESIUM SULFATE HEPTAHYDRATE 40 MG/ML
INJECTION, SOLUTION INTRAVENOUS
Status: DISPENSED
Start: 2024-09-05

## (undated) RX ORDER — LABETALOL HYDROCHLORIDE 5 MG/ML
INJECTION, SOLUTION INTRAVENOUS
Status: DISPENSED
Start: 2024-03-22